# Patient Record
Sex: MALE | Race: WHITE | Employment: FULL TIME | URBAN - METROPOLITAN AREA
[De-identification: names, ages, dates, MRNs, and addresses within clinical notes are randomized per-mention and may not be internally consistent; named-entity substitution may affect disease eponyms.]

---

## 2018-05-21 ENCOUNTER — APPOINTMENT (OUTPATIENT)
Dept: GENERAL RADIOLOGY | Age: 53
DRG: 304 | End: 2018-05-21
Payer: COMMERCIAL

## 2018-05-21 ENCOUNTER — APPOINTMENT (OUTPATIENT)
Dept: ULTRASOUND IMAGING | Age: 53
DRG: 304 | End: 2018-05-21
Payer: COMMERCIAL

## 2018-05-21 ENCOUNTER — APPOINTMENT (OUTPATIENT)
Dept: CT IMAGING | Age: 53
DRG: 304 | End: 2018-05-21
Payer: COMMERCIAL

## 2018-05-21 ENCOUNTER — HOSPITAL ENCOUNTER (INPATIENT)
Age: 53
LOS: 3 days | Discharge: HOME | DRG: 304 | End: 2018-05-24
Attending: EMERGENCY MEDICINE | Admitting: INTERNAL MEDICINE
Payer: COMMERCIAL

## 2018-05-21 DIAGNOSIS — J81.0 FLASH PULMONARY EDEMA (HCC): Primary | ICD-10-CM

## 2018-05-21 DIAGNOSIS — R06.89 ACUTE RESPIRATORY INSUFFICIENCY: ICD-10-CM

## 2018-05-21 DIAGNOSIS — I50.9 ACUTE CONGESTIVE HEART FAILURE, UNSPECIFIED CONGESTIVE HEART FAILURE TYPE: ICD-10-CM

## 2018-05-21 PROBLEM — J96.00 ACUTE RESPIRATORY FAILURE (HCC): Status: ACTIVE | Noted: 2018-05-21

## 2018-05-21 LAB
AADO2: 321.4 MMHG
ALBUMIN SERPL-MCNC: 3.5 G/DL (ref 3.5–5.2)
ALP BLD-CCNC: 98 U/L (ref 40–129)
ALT SERPL-CCNC: 21 U/L (ref 0–40)
ANION GAP SERPL CALCULATED.3IONS-SCNC: 13 MMOL/L (ref 7–16)
AST SERPL-CCNC: 30 U/L (ref 0–39)
B.E.: -3 MMOL/L (ref -3–3)
BASOPHILS ABSOLUTE: 0.04 E9/L (ref 0–0.2)
BASOPHILS RELATIVE PERCENT: 0.5 % (ref 0–2)
BILIRUB SERPL-MCNC: 0.4 MG/DL (ref 0–1.2)
BUN BLDV-MCNC: 26 MG/DL (ref 6–20)
CALCIUM SERPL-MCNC: 9.1 MG/DL (ref 8.6–10.2)
CHLORIDE BLD-SCNC: 106 MMOL/L (ref 98–107)
CHLORIDE URINE RANDOM: 99 MMOL/L
CO2: 23 MMOL/L (ref 22–29)
COHB: 0.9 % (ref 0–1.5)
CREAT SERPL-MCNC: 1.3 MG/DL (ref 0.7–1.2)
CREATININE URINE: 56 MG/DL (ref 40–278)
CRITICAL: ABNORMAL
DATE ANALYZED: ABNORMAL
DATE OF COLLECTION: ABNORMAL
EKG ATRIAL RATE: 103 BPM
EKG P AXIS: 46 DEGREES
EKG P-R INTERVAL: 144 MS
EKG Q-T INTERVAL: 338 MS
EKG QRS DURATION: 94 MS
EKG QTC CALCULATION (BAZETT): 442 MS
EKG R AXIS: -14 DEGREES
EKG T AXIS: 101 DEGREES
EKG VENTRICULAR RATE: 103 BPM
EOSINOPHILS ABSOLUTE: 0 E9/L (ref 0.05–0.5)
EOSINOPHILS RELATIVE PERCENT: 0 % (ref 0–6)
FILM ARRAY ADENOVIRUS: NORMAL
FILM ARRAY BORDETELLA PERTUSSIS: NORMAL
FILM ARRAY CHLAMYDOPHILIA PNEUMONIAE: NORMAL
FILM ARRAY CORONAVIRUS 229E: NORMAL
FILM ARRAY CORONAVIRUS HKU1: NORMAL
FILM ARRAY CORONAVIRUS NL63: NORMAL
FILM ARRAY CORONAVIRUS OC43: NORMAL
FILM ARRAY INFLUENZA A VIRUS 09H1: NORMAL
FILM ARRAY INFLUENZA A VIRUS H1: NORMAL
FILM ARRAY INFLUENZA A VIRUS H3: NORMAL
FILM ARRAY INFLUENZA A VIRUS: NORMAL
FILM ARRAY INFLUENZA B: NORMAL
FILM ARRAY METAPNEUMOVIRUS: NORMAL
FILM ARRAY MYCOPLASMA PNEUMONIAE: NORMAL
FILM ARRAY PARAINFLUENZA VIRUS 1: NORMAL
FILM ARRAY PARAINFLUENZA VIRUS 2: NORMAL
FILM ARRAY PARAINFLUENZA VIRUS 3: NORMAL
FILM ARRAY PARAINFLUENZA VIRUS 4: NORMAL
FILM ARRAY RESPIRATORY SYNCITIAL VIRUS: NORMAL
FILM ARRAY RHINOVIRUS/ENTEROVIRUS: NORMAL
FIO2: 100 %
GFR AFRICAN AMERICAN: >60
GFR NON-AFRICAN AMERICAN: 58 ML/MIN/1.73
GLUCOSE BLD-MCNC: 276 MG/DL (ref 74–109)
HCO3: 21 MMOL/L (ref 22–26)
HCT VFR BLD CALC: 44 % (ref 37–54)
HEMOGLOBIN: 14.8 G/DL (ref 12.5–16.5)
HHB: 0.3 % (ref 0–5)
IMMATURE GRANULOCYTES #: 0.02 E9/L
IMMATURE GRANULOCYTES %: 0.3 % (ref 0–5)
INFLUENZA A BY PCR: NOT DETECTED
INFLUENZA B BY PCR: NOT DETECTED
INR BLD: 1.1
LAB: ABNORMAL
LACTIC ACID: 1.4 MMOL/L (ref 0.5–2.2)
LYMPHOCYTES ABSOLUTE: 1.12 E9/L (ref 1.5–4)
LYMPHOCYTES RELATIVE PERCENT: 14.2 % (ref 20–42)
Lab: 1325
MCH RBC QN AUTO: 29.5 PG (ref 26–35)
MCHC RBC AUTO-ENTMCNC: 33.6 % (ref 32–34.5)
MCV RBC AUTO: 87.6 FL (ref 80–99.9)
METHB: 0.4 % (ref 0–1.5)
MODE: ABNORMAL
MONOCYTES ABSOLUTE: 0.5 E9/L (ref 0.1–0.95)
MONOCYTES RELATIVE PERCENT: 6.3 % (ref 2–12)
NEUTROPHILS ABSOLUTE: 6.2 E9/L (ref 1.8–7.3)
NEUTROPHILS RELATIVE PERCENT: 78.7 % (ref 43–80)
O2 CONTENT: 19.6 ML/DL
O2 SATURATION: 99.7 % (ref 92–98.5)
O2HB: 98.4 % (ref 94–97)
OPERATOR ID: 694
PATIENT TEMP: 37 C
PCO2: 34.4 MMHG (ref 35–45)
PDW BLD-RTO: 12.3 FL (ref 11.5–15)
PFO2: 3.32 MMHG/%
PH BLOOD GAS: 7.4 (ref 7.35–7.45)
PLATELET # BLD: 161 E9/L (ref 130–450)
PMV BLD AUTO: 10.5 FL (ref 7–12)
PO2: 332.2 MMHG (ref 60–100)
POTASSIUM SERPL-SCNC: 4.5 MMOL/L (ref 3.5–5)
PRO-BNP: 4556 PG/ML (ref 0–125)
PROTEIN PROTEIN: 313 MG/DL (ref 0–12)
PROTEIN/CREAT RATIO: 5.6
PROTEIN/CREAT RATIO: 5.6 (ref 0–0.2)
PROTHROMBIN TIME: 11.9 SEC (ref 9.3–12.4)
RBC # BLD: 5.02 E12/L (ref 3.8–5.8)
RI(T): 97 %
SODIUM BLD-SCNC: 142 MMOL/L (ref 132–146)
SODIUM URINE: 92 MMOL/L
SOURCE, BLOOD GAS: ABNORMAL
THB: 13.6 G/DL (ref 11.5–16.5)
TIME ANALYZED: 1328
TOTAL PROTEIN: 6.9 G/DL (ref 6.4–8.3)
TROPONIN: <0.01 NG/ML (ref 0–0.03)
TROPONIN: <0.01 NG/ML (ref 0–0.03)
WBC # BLD: 7.9 E9/L (ref 4.5–11.5)

## 2018-05-21 PROCEDURE — 6370000000 HC RX 637 (ALT 250 FOR IP): Performed by: EMERGENCY MEDICINE

## 2018-05-21 PROCEDURE — 2580000003 HC RX 258: Performed by: INTERNAL MEDICINE

## 2018-05-21 PROCEDURE — 87502 INFLUENZA DNA AMP PROBE: CPT

## 2018-05-21 PROCEDURE — 99254 IP/OBS CNSLTJ NEW/EST MOD 60: CPT | Performed by: INTERNAL MEDICINE

## 2018-05-21 PROCEDURE — 87503 INFLUENZA DNA AMP PROB ADDL: CPT

## 2018-05-21 PROCEDURE — 84156 ASSAY OF PROTEIN URINE: CPT

## 2018-05-21 PROCEDURE — 82570 ASSAY OF URINE CREATININE: CPT

## 2018-05-21 PROCEDURE — 2060000000 HC ICU INTERMEDIATE R&B

## 2018-05-21 PROCEDURE — 87486 CHLMYD PNEUM DNA AMP PROBE: CPT

## 2018-05-21 PROCEDURE — 6360000002 HC RX W HCPCS: Performed by: INTERNAL MEDICINE

## 2018-05-21 PROCEDURE — 6360000004 HC RX CONTRAST MEDICATION: Performed by: RADIOLOGY

## 2018-05-21 PROCEDURE — 83605 ASSAY OF LACTIC ACID: CPT

## 2018-05-21 PROCEDURE — 6370000000 HC RX 637 (ALT 250 FOR IP): Performed by: INTERNAL MEDICINE

## 2018-05-21 PROCEDURE — 87581 M.PNEUMON DNA AMP PROBE: CPT

## 2018-05-21 PROCEDURE — 87798 DETECT AGENT NOS DNA AMP: CPT

## 2018-05-21 PROCEDURE — 71045 X-RAY EXAM CHEST 1 VIEW: CPT

## 2018-05-21 PROCEDURE — 82805 BLOOD GASES W/O2 SATURATION: CPT

## 2018-05-21 PROCEDURE — 85610 PROTHROMBIN TIME: CPT

## 2018-05-21 PROCEDURE — 2500000003 HC RX 250 WO HCPCS: Performed by: INTERNAL MEDICINE

## 2018-05-21 PROCEDURE — 82436 ASSAY OF URINE CHLORIDE: CPT

## 2018-05-21 PROCEDURE — 36415 COLL VENOUS BLD VENIPUNCTURE: CPT

## 2018-05-21 PROCEDURE — 93970 EXTREMITY STUDY: CPT

## 2018-05-21 PROCEDURE — 2500000003 HC RX 250 WO HCPCS: Performed by: EMERGENCY MEDICINE

## 2018-05-21 PROCEDURE — 94640 AIRWAY INHALATION TREATMENT: CPT

## 2018-05-21 PROCEDURE — 84300 ASSAY OF URINE SODIUM: CPT

## 2018-05-21 PROCEDURE — 84484 ASSAY OF TROPONIN QUANT: CPT

## 2018-05-21 PROCEDURE — 87040 BLOOD CULTURE FOR BACTERIA: CPT

## 2018-05-21 PROCEDURE — 96376 TX/PRO/DX INJ SAME DRUG ADON: CPT

## 2018-05-21 PROCEDURE — 6360000002 HC RX W HCPCS: Performed by: EMERGENCY MEDICINE

## 2018-05-21 PROCEDURE — 93005 ELECTROCARDIOGRAM TRACING: CPT | Performed by: EMERGENCY MEDICINE

## 2018-05-21 PROCEDURE — 71275 CT ANGIOGRAPHY CHEST: CPT

## 2018-05-21 PROCEDURE — 94664 DEMO&/EVAL PT USE INHALER: CPT

## 2018-05-21 PROCEDURE — 96375 TX/PRO/DX INJ NEW DRUG ADDON: CPT

## 2018-05-21 PROCEDURE — 96374 THER/PROPH/DIAG INJ IV PUSH: CPT

## 2018-05-21 PROCEDURE — 94660 CPAP INITIATION&MGMT: CPT

## 2018-05-21 PROCEDURE — 83880 ASSAY OF NATRIURETIC PEPTIDE: CPT

## 2018-05-21 PROCEDURE — 85025 COMPLETE CBC W/AUTO DIFF WBC: CPT

## 2018-05-21 PROCEDURE — 99285 EMERGENCY DEPT VISIT HI MDM: CPT

## 2018-05-21 PROCEDURE — 80053 COMPREHEN METABOLIC PANEL: CPT

## 2018-05-21 PROCEDURE — 94760 N-INVAS EAR/PLS OXIMETRY 1: CPT

## 2018-05-21 PROCEDURE — 76770 US EXAM ABDO BACK WALL COMP: CPT

## 2018-05-21 RX ORDER — IPRATROPIUM BROMIDE AND ALBUTEROL SULFATE 2.5; .5 MG/3ML; MG/3ML
3 SOLUTION RESPIRATORY (INHALATION) ONCE
Status: COMPLETED | OUTPATIENT
Start: 2018-05-21 | End: 2018-05-21

## 2018-05-21 RX ORDER — HYDRALAZINE HYDROCHLORIDE 20 MG/ML
10 INJECTION INTRAMUSCULAR; INTRAVENOUS EVERY 4 HOURS PRN
Status: DISCONTINUED | OUTPATIENT
Start: 2018-05-21 | End: 2018-05-21

## 2018-05-21 RX ORDER — SODIUM CHLORIDE 0.9 % (FLUSH) 0.9 %
10 SYRINGE (ML) INJECTION EVERY 12 HOURS SCHEDULED
Status: DISCONTINUED | OUTPATIENT
Start: 2018-05-21 | End: 2018-05-24 | Stop reason: HOSPADM

## 2018-05-21 RX ORDER — ACETAMINOPHEN 325 MG/1
650 TABLET ORAL EVERY 4 HOURS PRN
Status: DISCONTINUED | OUTPATIENT
Start: 2018-05-21 | End: 2018-05-24 | Stop reason: HOSPADM

## 2018-05-21 RX ORDER — SODIUM CHLORIDE 0.9 % (FLUSH) 0.9 %
10 SYRINGE (ML) INJECTION PRN
Status: DISCONTINUED | OUTPATIENT
Start: 2018-05-21 | End: 2018-05-23 | Stop reason: SDUPTHER

## 2018-05-21 RX ORDER — AMLODIPINE BESYLATE 5 MG/1
5 TABLET ORAL DAILY
Status: DISCONTINUED | OUTPATIENT
Start: 2018-05-21 | End: 2018-05-24

## 2018-05-21 RX ORDER — LABETALOL HYDROCHLORIDE 5 MG/ML
10 INJECTION, SOLUTION INTRAVENOUS ONCE
Status: COMPLETED | OUTPATIENT
Start: 2018-05-21 | End: 2018-05-21

## 2018-05-21 RX ORDER — LABETALOL HYDROCHLORIDE 5 MG/ML
10 INJECTION, SOLUTION INTRAVENOUS EVERY 4 HOURS PRN
Status: DISCONTINUED | OUTPATIENT
Start: 2018-05-21 | End: 2018-05-24 | Stop reason: HOSPADM

## 2018-05-21 RX ORDER — SODIUM CHLORIDE 0.9 % (FLUSH) 0.9 %
10 SYRINGE (ML) INJECTION PRN
Status: DISCONTINUED | OUTPATIENT
Start: 2018-05-21 | End: 2018-05-24 | Stop reason: HOSPADM

## 2018-05-21 RX ORDER — HYDRALAZINE HYDROCHLORIDE 20 MG/ML
10 INJECTION INTRAMUSCULAR; INTRAVENOUS EVERY 4 HOURS PRN
Status: DISCONTINUED | OUTPATIENT
Start: 2018-05-21 | End: 2018-05-24 | Stop reason: HOSPADM

## 2018-05-21 RX ORDER — FUROSEMIDE 10 MG/ML
40 INJECTION INTRAMUSCULAR; INTRAVENOUS ONCE
Status: COMPLETED | OUTPATIENT
Start: 2018-05-21 | End: 2018-05-21

## 2018-05-21 RX ORDER — SODIUM CHLORIDE 0.9 % (FLUSH) 0.9 %
10 SYRINGE (ML) INJECTION EVERY 12 HOURS SCHEDULED
Status: DISCONTINUED | OUTPATIENT
Start: 2018-05-21 | End: 2018-05-23 | Stop reason: SDUPTHER

## 2018-05-21 RX ORDER — LABETALOL HYDROCHLORIDE 5 MG/ML
20 INJECTION, SOLUTION INTRAVENOUS ONCE
Status: COMPLETED | OUTPATIENT
Start: 2018-05-21 | End: 2018-05-21

## 2018-05-21 RX ORDER — ONDANSETRON 2 MG/ML
4 INJECTION INTRAMUSCULAR; INTRAVENOUS EVERY 6 HOURS PRN
Status: DISCONTINUED | OUTPATIENT
Start: 2018-05-21 | End: 2018-05-24 | Stop reason: HOSPADM

## 2018-05-21 RX ORDER — HYDRALAZINE HYDROCHLORIDE 20 MG/ML
10 INJECTION INTRAMUSCULAR; INTRAVENOUS ONCE
Status: COMPLETED | OUTPATIENT
Start: 2018-05-21 | End: 2018-05-21

## 2018-05-21 RX ADMIN — IOPAMIDOL 90 ML: 755 INJECTION, SOLUTION INTRAVENOUS at 13:30

## 2018-05-21 RX ADMIN — NITROGLYCERIN 0.5 INCH: 20 OINTMENT TOPICAL at 11:46

## 2018-05-21 RX ADMIN — FUROSEMIDE 40 MG: 10 INJECTION, SOLUTION INTRAMUSCULAR; INTRAVENOUS at 14:58

## 2018-05-21 RX ADMIN — NITROGLYCERIN 1 INCH: 20 OINTMENT TOPICAL at 18:03

## 2018-05-21 RX ADMIN — HYDRALAZINE HYDROCHLORIDE 10 MG: 20 INJECTION INTRAMUSCULAR; INTRAVENOUS at 20:56

## 2018-05-21 RX ADMIN — AMLODIPINE BESYLATE 5 MG: 5 TABLET ORAL at 18:38

## 2018-05-21 RX ADMIN — HYDRALAZINE HYDROCHLORIDE 10 MG: 20 INJECTION INTRAMUSCULAR; INTRAVENOUS at 10:00

## 2018-05-21 RX ADMIN — LABETALOL HYDROCHLORIDE 20 MG: 5 INJECTION INTRAVENOUS at 14:23

## 2018-05-21 RX ADMIN — IPRATROPIUM BROMIDE AND ALBUTEROL SULFATE 3 AMPULE: .5; 3 SOLUTION RESPIRATORY (INHALATION) at 11:23

## 2018-05-21 RX ADMIN — HYDRALAZINE HYDROCHLORIDE 10 MG: 20 INJECTION INTRAMUSCULAR; INTRAVENOUS at 18:03

## 2018-05-21 RX ADMIN — LABETALOL HYDROCHLORIDE 10 MG: 5 INJECTION INTRAVENOUS at 11:44

## 2018-05-21 RX ADMIN — ENOXAPARIN SODIUM 40 MG: 40 INJECTION, SOLUTION INTRAVENOUS; SUBCUTANEOUS at 18:03

## 2018-05-21 RX ADMIN — LABETALOL HYDROCHLORIDE 10 MG: 5 INJECTION, SOLUTION INTRAVENOUS at 18:43

## 2018-05-21 RX ADMIN — FUROSEMIDE 40 MG: 10 INJECTION, SOLUTION INTRAMUSCULAR; INTRAVENOUS at 11:30

## 2018-05-21 RX ADMIN — Medication 10 ML: at 20:57

## 2018-05-21 RX ADMIN — Medication 10 ML: at 21:07

## 2018-05-21 ASSESSMENT — ENCOUNTER SYMPTOMS
SINUS PRESSURE: 0
COLOR CHANGE: 0
EYE REDNESS: 0
SHORTNESS OF BREATH: 1
WHEEZING: 1
SPUTUM PRODUCTION: 0
SORE THROAT: 0
ABDOMINAL DISTENTION: 1
VOMITING: 0
RHINORRHEA: 0
EYE PAIN: 0
COUGH: 1
HEMOPTYSIS: 0
NAUSEA: 0
DIARRHEA: 0
ABDOMINAL PAIN: 0

## 2018-05-21 ASSESSMENT — PAIN SCALES - GENERAL
PAINLEVEL_OUTOF10: 0

## 2018-05-22 ENCOUNTER — APPOINTMENT (OUTPATIENT)
Dept: GENERAL RADIOLOGY | Age: 53
DRG: 304 | End: 2018-05-22
Payer: COMMERCIAL

## 2018-05-22 PROBLEM — I16.0 HYPERTENSIVE URGENCY: Status: ACTIVE | Noted: 2018-05-22

## 2018-05-22 PROBLEM — I31.39 PERICARDIAL EFFUSION: Status: ACTIVE | Noted: 2018-05-22

## 2018-05-22 PROBLEM — J90 BILATERAL PLEURAL EFFUSION: Status: ACTIVE | Noted: 2018-05-22

## 2018-05-22 PROBLEM — I50.31 ACUTE DIASTOLIC CHF (CONGESTIVE HEART FAILURE) (HCC): Status: ACTIVE | Noted: 2018-05-22

## 2018-05-22 LAB
ALBUMIN SERPL-MCNC: 2.8 G/DL (ref 3.5–5.2)
ALP BLD-CCNC: 64 U/L (ref 40–129)
ALT SERPL-CCNC: 14 U/L (ref 0–40)
ANION GAP SERPL CALCULATED.3IONS-SCNC: 13 MMOL/L (ref 7–16)
AST SERPL-CCNC: 16 U/L (ref 0–39)
BASOPHILS ABSOLUTE: 0.03 E9/L (ref 0–0.2)
BASOPHILS RELATIVE PERCENT: 0.4 % (ref 0–2)
BILIRUB SERPL-MCNC: 0.5 MG/DL (ref 0–1.2)
BUN BLDV-MCNC: 27 MG/DL (ref 6–20)
CALCIUM SERPL-MCNC: 8.6 MG/DL (ref 8.6–10.2)
CHLORIDE BLD-SCNC: 106 MMOL/L (ref 98–107)
CO2: 23 MMOL/L (ref 22–29)
CREAT SERPL-MCNC: 1.5 MG/DL (ref 0.7–1.2)
EKG ATRIAL RATE: 72 BPM
EKG P AXIS: 36 DEGREES
EKG P-R INTERVAL: 148 MS
EKG Q-T INTERVAL: 402 MS
EKG QRS DURATION: 94 MS
EKG QTC CALCULATION (BAZETT): 440 MS
EKG R AXIS: -5 DEGREES
EKG T AXIS: 125 DEGREES
EKG VENTRICULAR RATE: 72 BPM
EOSINOPHILS ABSOLUTE: 0 E9/L (ref 0.05–0.5)
EOSINOPHILS RELATIVE PERCENT: 0 % (ref 0–6)
GFR AFRICAN AMERICAN: 60
GFR NON-AFRICAN AMERICAN: 49 ML/MIN/1.73
GLUCOSE BLD-MCNC: 218 MG/DL (ref 74–109)
HCT VFR BLD CALC: 36 % (ref 37–54)
HEMOGLOBIN: 12.4 G/DL (ref 12.5–16.5)
IMMATURE GRANULOCYTES #: 0.03 E9/L
IMMATURE GRANULOCYTES %: 0.4 % (ref 0–5)
LEFT VENTRICULAR EJECTION FRACTION MODE: NORMAL
LV EF: 45 %
LV EF: 45 %
LVEF MODALITY: NORMAL
LYMPHOCYTES ABSOLUTE: 1.22 E9/L (ref 1.5–4)
LYMPHOCYTES RELATIVE PERCENT: 17.2 % (ref 20–42)
MAGNESIUM: 1.9 MG/DL (ref 1.6–2.6)
MCH RBC QN AUTO: 29.6 PG (ref 26–35)
MCHC RBC AUTO-ENTMCNC: 34.4 % (ref 32–34.5)
MCV RBC AUTO: 85.9 FL (ref 80–99.9)
METER GLUCOSE: 182 MG/DL (ref 70–110)
METER GLUCOSE: 218 MG/DL (ref 70–110)
METER GLUCOSE: 278 MG/DL (ref 70–110)
MONOCYTES ABSOLUTE: 0.6 E9/L (ref 0.1–0.95)
MONOCYTES RELATIVE PERCENT: 8.4 % (ref 2–12)
NEUTROPHILS ABSOLUTE: 5.23 E9/L (ref 1.8–7.3)
NEUTROPHILS RELATIVE PERCENT: 73.6 % (ref 43–80)
PDW BLD-RTO: 12.7 FL (ref 11.5–15)
PHOSPHORUS: 3.9 MG/DL (ref 2.5–4.5)
PLATELET # BLD: 166 E9/L (ref 130–450)
PMV BLD AUTO: 10.3 FL (ref 7–12)
POTASSIUM REFLEX MAGNESIUM: 3.5 MMOL/L (ref 3.5–5)
RBC # BLD: 4.19 E12/L (ref 3.8–5.8)
SEDIMENTATION RATE, ERYTHROCYTE: 32 MM/HR (ref 0–15)
SODIUM BLD-SCNC: 142 MMOL/L (ref 132–146)
TOTAL PROTEIN: 5.6 G/DL (ref 6.4–8.3)
TROPONIN: 0.01 NG/ML (ref 0–0.03)
TSH SERPL DL<=0.05 MIU/L-ACNC: 1.27 UIU/ML (ref 0.27–4.2)
WBC # BLD: 7.1 E9/L (ref 4.5–11.5)

## 2018-05-22 PROCEDURE — G8988 SELF CARE GOAL STATUS: HCPCS

## 2018-05-22 PROCEDURE — 6370000000 HC RX 637 (ALT 250 FOR IP): Performed by: INTERNAL MEDICINE

## 2018-05-22 PROCEDURE — 84100 ASSAY OF PHOSPHORUS: CPT

## 2018-05-22 PROCEDURE — 84443 ASSAY THYROID STIM HORMONE: CPT

## 2018-05-22 PROCEDURE — 97161 PT EVAL LOW COMPLEX 20 MIN: CPT

## 2018-05-22 PROCEDURE — 82962 GLUCOSE BLOOD TEST: CPT

## 2018-05-22 PROCEDURE — 99233 SBSQ HOSP IP/OBS HIGH 50: CPT | Performed by: INTERNAL MEDICINE

## 2018-05-22 PROCEDURE — 83735 ASSAY OF MAGNESIUM: CPT

## 2018-05-22 PROCEDURE — 85651 RBC SED RATE NONAUTOMATED: CPT

## 2018-05-22 PROCEDURE — 2060000000 HC ICU INTERMEDIATE R&B

## 2018-05-22 PROCEDURE — 80053 COMPREHEN METABOLIC PANEL: CPT

## 2018-05-22 PROCEDURE — 94660 CPAP INITIATION&MGMT: CPT

## 2018-05-22 PROCEDURE — 36415 COLL VENOUS BLD VENIPUNCTURE: CPT

## 2018-05-22 PROCEDURE — 2700000000 HC OXYGEN THERAPY PER DAY

## 2018-05-22 PROCEDURE — 6360000002 HC RX W HCPCS: Performed by: INTERNAL MEDICINE

## 2018-05-22 PROCEDURE — 2500000003 HC RX 250 WO HCPCS: Performed by: INTERNAL MEDICINE

## 2018-05-22 PROCEDURE — 71046 X-RAY EXAM CHEST 2 VIEWS: CPT

## 2018-05-22 PROCEDURE — G8987 SELF CARE CURRENT STATUS: HCPCS

## 2018-05-22 PROCEDURE — 97165 OT EVAL LOW COMPLEX 30 MIN: CPT

## 2018-05-22 PROCEDURE — 85025 COMPLETE CBC W/AUTO DIFF WBC: CPT

## 2018-05-22 PROCEDURE — 93010 ELECTROCARDIOGRAM REPORT: CPT | Performed by: INTERNAL MEDICINE

## 2018-05-22 PROCEDURE — 93306 TTE W/DOPPLER COMPLETE: CPT

## 2018-05-22 PROCEDURE — 2580000003 HC RX 258: Performed by: INTERNAL MEDICINE

## 2018-05-22 PROCEDURE — G8989 SELF CARE D/C STATUS: HCPCS

## 2018-05-22 PROCEDURE — 84484 ASSAY OF TROPONIN QUANT: CPT

## 2018-05-22 PROCEDURE — 93005 ELECTROCARDIOGRAM TRACING: CPT | Performed by: INTERNAL MEDICINE

## 2018-05-22 RX ORDER — NICOTINE POLACRILEX 4 MG
15 LOZENGE BUCCAL PRN
Status: DISCONTINUED | OUTPATIENT
Start: 2018-05-22 | End: 2018-05-24 | Stop reason: HOSPADM

## 2018-05-22 RX ORDER — METOPROLOL SUCCINATE 25 MG/1
25 TABLET, EXTENDED RELEASE ORAL DAILY
Status: DISCONTINUED | OUTPATIENT
Start: 2018-05-22 | End: 2018-05-23

## 2018-05-22 RX ORDER — DEXTROSE MONOHYDRATE 25 G/50ML
12.5 INJECTION, SOLUTION INTRAVENOUS PRN
Status: DISCONTINUED | OUTPATIENT
Start: 2018-05-22 | End: 2018-05-24 | Stop reason: HOSPADM

## 2018-05-22 RX ORDER — ISOSORBIDE DINITRATE 10 MG/1
20 TABLET ORAL 3 TIMES DAILY
Status: DISCONTINUED | OUTPATIENT
Start: 2018-05-22 | End: 2018-05-24

## 2018-05-22 RX ORDER — ATORVASTATIN CALCIUM 40 MG/1
40 TABLET, FILM COATED ORAL NIGHTLY
Status: DISCONTINUED | OUTPATIENT
Start: 2018-05-22 | End: 2018-05-24 | Stop reason: HOSPADM

## 2018-05-22 RX ORDER — FUROSEMIDE 10 MG/ML
40 INJECTION INTRAMUSCULAR; INTRAVENOUS DAILY
Status: DISCONTINUED | OUTPATIENT
Start: 2018-05-22 | End: 2018-05-23

## 2018-05-22 RX ORDER — DEXTROSE MONOHYDRATE 50 MG/ML
100 INJECTION, SOLUTION INTRAVENOUS PRN
Status: DISCONTINUED | OUTPATIENT
Start: 2018-05-22 | End: 2018-05-24 | Stop reason: HOSPADM

## 2018-05-22 RX ORDER — INSULIN GLARGINE 100 [IU]/ML
0.25 INJECTION, SOLUTION SUBCUTANEOUS NIGHTLY
Status: DISCONTINUED | OUTPATIENT
Start: 2018-05-22 | End: 2018-05-23

## 2018-05-22 RX ORDER — DOCUSATE SODIUM 100 MG/1
100 CAPSULE, LIQUID FILLED ORAL DAILY
Status: DISCONTINUED | OUTPATIENT
Start: 2018-05-22 | End: 2018-05-24 | Stop reason: HOSPADM

## 2018-05-22 RX ORDER — HYDRALAZINE HYDROCHLORIDE 25 MG/1
25 TABLET, FILM COATED ORAL EVERY 8 HOURS SCHEDULED
Status: DISCONTINUED | OUTPATIENT
Start: 2018-05-22 | End: 2018-05-24

## 2018-05-22 RX ADMIN — Medication 10 ML: at 09:51

## 2018-05-22 RX ADMIN — INSULIN LISPRO 2 UNITS: 100 INJECTION, SOLUTION INTRAVENOUS; SUBCUTANEOUS at 16:47

## 2018-05-22 RX ADMIN — NITROGLYCERIN 1 INCH: 20 OINTMENT TOPICAL at 00:23

## 2018-05-22 RX ADMIN — HYDRALAZINE HYDROCHLORIDE 10 MG: 20 INJECTION INTRAMUSCULAR; INTRAVENOUS at 17:32

## 2018-05-22 RX ADMIN — HYDRALAZINE HYDROCHLORIDE 25 MG: 25 TABLET, FILM COATED ORAL at 14:04

## 2018-05-22 RX ADMIN — METOPROLOL SUCCINATE 25 MG: 25 TABLET, EXTENDED RELEASE ORAL at 09:51

## 2018-05-22 RX ADMIN — INSULIN GLARGINE 25 UNITS: 100 INJECTION, SOLUTION SUBCUTANEOUS at 21:29

## 2018-05-22 RX ADMIN — HYDRALAZINE HYDROCHLORIDE 25 MG: 25 TABLET, FILM COATED ORAL at 06:26

## 2018-05-22 RX ADMIN — Medication 10 ML: at 21:26

## 2018-05-22 RX ADMIN — ISOSORBIDE DINITRATE 20 MG: 10 TABLET ORAL at 21:25

## 2018-05-22 RX ADMIN — ATORVASTATIN CALCIUM 40 MG: 40 TABLET, FILM COATED ORAL at 21:25

## 2018-05-22 RX ADMIN — LABETALOL HYDROCHLORIDE 10 MG: 5 INJECTION INTRAVENOUS at 02:08

## 2018-05-22 RX ADMIN — Medication 10 ML: at 21:27

## 2018-05-22 RX ADMIN — FUROSEMIDE 40 MG: 10 INJECTION, SOLUTION INTRAMUSCULAR; INTRAVENOUS at 09:51

## 2018-05-22 RX ADMIN — ISOSORBIDE DINITRATE 20 MG: 10 TABLET ORAL at 09:51

## 2018-05-22 RX ADMIN — INSULIN LISPRO 2 UNITS: 100 INJECTION, SOLUTION INTRAVENOUS; SUBCUTANEOUS at 21:29

## 2018-05-22 RX ADMIN — HYDRALAZINE HYDROCHLORIDE 25 MG: 25 TABLET, FILM COATED ORAL at 21:27

## 2018-05-22 RX ADMIN — AMLODIPINE BESYLATE 5 MG: 5 TABLET ORAL at 09:51

## 2018-05-22 RX ADMIN — DOCUSATE SODIUM 100 MG: 100 CAPSULE, LIQUID FILLED ORAL at 10:39

## 2018-05-22 RX ADMIN — ISOSORBIDE DINITRATE 20 MG: 10 TABLET ORAL at 14:04

## 2018-05-22 RX ADMIN — INSULIN LISPRO 6 UNITS: 100 INJECTION, SOLUTION INTRAVENOUS; SUBCUTANEOUS at 11:33

## 2018-05-22 ASSESSMENT — PAIN SCALES - GENERAL
PAINLEVEL_OUTOF10: 0
PAINLEVEL_OUTOF10: 0

## 2018-05-23 LAB
ALBUMIN SERPL-MCNC: 2.9 G/DL (ref 3.5–5.2)
ALP BLD-CCNC: 58 U/L (ref 40–129)
ALT SERPL-CCNC: 12 U/L (ref 0–40)
ANION GAP SERPL CALCULATED.3IONS-SCNC: 13 MMOL/L (ref 7–16)
ANTI-NUCLEAR ANTIBODY (ANA): NEGATIVE
AST SERPL-CCNC: 16 U/L (ref 0–39)
BASOPHILS ABSOLUTE: 0.04 E9/L (ref 0–0.2)
BASOPHILS RELATIVE PERCENT: 0.6 % (ref 0–2)
BILIRUB SERPL-MCNC: 0.4 MG/DL (ref 0–1.2)
BUN BLDV-MCNC: 31 MG/DL (ref 6–20)
C3 COMPLEMENT: 117 MG/DL (ref 90–180)
C4 COMPLEMENT: 32 MG/DL (ref 10–40)
CALCIUM SERPL-MCNC: 8.5 MG/DL (ref 8.6–10.2)
CHLORIDE BLD-SCNC: 102 MMOL/L (ref 98–107)
CO2: 25 MMOL/L (ref 22–29)
CREAT SERPL-MCNC: 1.7 MG/DL (ref 0.7–1.2)
EOSINOPHILS ABSOLUTE: 0 E9/L (ref 0.05–0.5)
EOSINOPHILS RELATIVE PERCENT: 0 % (ref 0–6)
GFR AFRICAN AMERICAN: 52
GFR NON-AFRICAN AMERICAN: 43 ML/MIN/1.73
GLUCOSE BLD-MCNC: 155 MG/DL (ref 74–109)
HBA1C MFR BLD: 9.3 % (ref 4.8–5.9)
HCT VFR BLD CALC: 33.3 % (ref 37–54)
HEMOGLOBIN: 11.2 G/DL (ref 12.5–16.5)
IMMATURE GRANULOCYTES #: 0.02 E9/L
IMMATURE GRANULOCYTES %: 0.3 % (ref 0–5)
LYMPHOCYTES ABSOLUTE: 1.45 E9/L (ref 1.5–4)
LYMPHOCYTES RELATIVE PERCENT: 23.3 % (ref 20–42)
MAGNESIUM: 1.9 MG/DL (ref 1.6–2.6)
MCH RBC QN AUTO: 29.8 PG (ref 26–35)
MCHC RBC AUTO-ENTMCNC: 33.6 % (ref 32–34.5)
MCV RBC AUTO: 88.6 FL (ref 80–99.9)
METER GLUCOSE: 157 MG/DL (ref 70–110)
METER GLUCOSE: 167 MG/DL (ref 70–110)
METER GLUCOSE: 169 MG/DL (ref 70–110)
METER GLUCOSE: 190 MG/DL (ref 70–110)
MONOCYTES ABSOLUTE: 0.67 E9/L (ref 0.1–0.95)
MONOCYTES RELATIVE PERCENT: 10.8 % (ref 2–12)
NEUTROPHILS ABSOLUTE: 4.03 E9/L (ref 1.8–7.3)
NEUTROPHILS RELATIVE PERCENT: 65 % (ref 43–80)
PDW BLD-RTO: 12.8 FL (ref 11.5–15)
PLATELET # BLD: 154 E9/L (ref 130–450)
PMV BLD AUTO: 10.1 FL (ref 7–12)
POTASSIUM REFLEX MAGNESIUM: 3.3 MMOL/L (ref 3.5–5)
RBC # BLD: 3.76 E12/L (ref 3.8–5.8)
SODIUM BLD-SCNC: 140 MMOL/L (ref 132–146)
TOTAL PROTEIN: 5.1 G/DL (ref 6.4–8.3)
URIC ACID, SERUM: 7.8 MG/DL (ref 3.4–7)
WBC # BLD: 6.2 E9/L (ref 4.5–11.5)

## 2018-05-23 PROCEDURE — 2580000003 HC RX 258: Performed by: INTERNAL MEDICINE

## 2018-05-23 PROCEDURE — 80053 COMPREHEN METABOLIC PANEL: CPT

## 2018-05-23 PROCEDURE — 83735 ASSAY OF MAGNESIUM: CPT

## 2018-05-23 PROCEDURE — 2060000000 HC ICU INTERMEDIATE R&B

## 2018-05-23 PROCEDURE — 6370000000 HC RX 637 (ALT 250 FOR IP): Performed by: INTERNAL MEDICINE

## 2018-05-23 PROCEDURE — 6360000002 HC RX W HCPCS: Performed by: INTERNAL MEDICINE

## 2018-05-23 PROCEDURE — 99233 SBSQ HOSP IP/OBS HIGH 50: CPT | Performed by: INTERNAL MEDICINE

## 2018-05-23 PROCEDURE — 86038 ANTINUCLEAR ANTIBODIES: CPT

## 2018-05-23 PROCEDURE — 86160 COMPLEMENT ANTIGEN: CPT

## 2018-05-23 PROCEDURE — 83036 HEMOGLOBIN GLYCOSYLATED A1C: CPT

## 2018-05-23 PROCEDURE — 36415 COLL VENOUS BLD VENIPUNCTURE: CPT

## 2018-05-23 PROCEDURE — 2500000003 HC RX 250 WO HCPCS: Performed by: INTERNAL MEDICINE

## 2018-05-23 PROCEDURE — 84156 ASSAY OF PROTEIN URINE: CPT

## 2018-05-23 PROCEDURE — 82575 CREATININE CLEARANCE TEST: CPT

## 2018-05-23 PROCEDURE — 85025 COMPLETE CBC W/AUTO DIFF WBC: CPT

## 2018-05-23 PROCEDURE — 84300 ASSAY OF URINE SODIUM: CPT

## 2018-05-23 PROCEDURE — 82962 GLUCOSE BLOOD TEST: CPT

## 2018-05-23 PROCEDURE — 84550 ASSAY OF BLOOD/URIC ACID: CPT

## 2018-05-23 PROCEDURE — 2700000000 HC OXYGEN THERAPY PER DAY

## 2018-05-23 PROCEDURE — 94660 CPAP INITIATION&MGMT: CPT

## 2018-05-23 RX ORDER — METOPROLOL SUCCINATE 50 MG/1
50 TABLET, EXTENDED RELEASE ORAL DAILY
Status: DISCONTINUED | OUTPATIENT
Start: 2018-05-24 | End: 2018-05-24 | Stop reason: HOSPADM

## 2018-05-23 RX ORDER — FUROSEMIDE 10 MG/ML
40 INJECTION INTRAMUSCULAR; INTRAVENOUS 2 TIMES DAILY
Status: DISCONTINUED | OUTPATIENT
Start: 2018-05-23 | End: 2018-05-24 | Stop reason: HOSPADM

## 2018-05-23 RX ORDER — GLIPIZIDE 5 MG/1
2.5 TABLET ORAL
Status: DISCONTINUED | OUTPATIENT
Start: 2018-05-24 | End: 2018-05-24 | Stop reason: HOSPADM

## 2018-05-23 RX ORDER — POTASSIUM CHLORIDE 20 MEQ/1
40 TABLET, EXTENDED RELEASE ORAL 2 TIMES DAILY WITH MEALS
Status: COMPLETED | OUTPATIENT
Start: 2018-05-23 | End: 2018-05-24

## 2018-05-23 RX ORDER — METOPROLOL SUCCINATE 25 MG/1
25 TABLET, EXTENDED RELEASE ORAL ONCE
Status: COMPLETED | OUTPATIENT
Start: 2018-05-23 | End: 2018-05-23

## 2018-05-23 RX ADMIN — HYDRALAZINE HYDROCHLORIDE 10 MG: 20 INJECTION INTRAMUSCULAR; INTRAVENOUS at 11:01

## 2018-05-23 RX ADMIN — ISOSORBIDE DINITRATE 20 MG: 10 TABLET ORAL at 20:47

## 2018-05-23 RX ADMIN — INSULIN LISPRO 2 UNITS: 100 INJECTION, SOLUTION INTRAVENOUS; SUBCUTANEOUS at 11:01

## 2018-05-23 RX ADMIN — DOCUSATE SODIUM 100 MG: 100 CAPSULE, LIQUID FILLED ORAL at 08:58

## 2018-05-23 RX ADMIN — HYDRALAZINE HYDROCHLORIDE 25 MG: 25 TABLET, FILM COATED ORAL at 14:38

## 2018-05-23 RX ADMIN — ISOSORBIDE DINITRATE 20 MG: 10 TABLET ORAL at 08:58

## 2018-05-23 RX ADMIN — FUROSEMIDE 40 MG: 10 INJECTION, SOLUTION INTRAMUSCULAR; INTRAVENOUS at 08:58

## 2018-05-23 RX ADMIN — Medication 10 ML: at 20:49

## 2018-05-23 RX ADMIN — LINAGLIPTIN 5 MG: 5 TABLET, FILM COATED ORAL at 11:01

## 2018-05-23 RX ADMIN — INSULIN LISPRO 2 UNITS: 100 INJECTION, SOLUTION INTRAVENOUS; SUBCUTANEOUS at 16:21

## 2018-05-23 RX ADMIN — METOPROLOL SUCCINATE 25 MG: 25 TABLET, EXTENDED RELEASE ORAL at 08:58

## 2018-05-23 RX ADMIN — METOPROLOL SUCCINATE 25 MG: 25 TABLET, EXTENDED RELEASE ORAL at 12:58

## 2018-05-23 RX ADMIN — INSULIN LISPRO 2 UNITS: 100 INJECTION, SOLUTION INTRAVENOUS; SUBCUTANEOUS at 09:03

## 2018-05-23 RX ADMIN — POTASSIUM CHLORIDE 40 MEQ: 20 TABLET, EXTENDED RELEASE ORAL at 16:21

## 2018-05-23 RX ADMIN — HYDRALAZINE HYDROCHLORIDE 25 MG: 25 TABLET, FILM COATED ORAL at 06:43

## 2018-05-23 RX ADMIN — LABETALOL HYDROCHLORIDE 10 MG: 5 INJECTION INTRAVENOUS at 04:14

## 2018-05-23 RX ADMIN — ATORVASTATIN CALCIUM 40 MG: 40 TABLET, FILM COATED ORAL at 20:47

## 2018-05-23 RX ADMIN — POTASSIUM CHLORIDE 40 MEQ: 20 TABLET, EXTENDED RELEASE ORAL at 08:58

## 2018-05-23 RX ADMIN — AMLODIPINE BESYLATE 5 MG: 5 TABLET ORAL at 08:58

## 2018-05-23 RX ADMIN — Medication 10 ML: at 08:58

## 2018-05-23 RX ADMIN — ISOSORBIDE DINITRATE 20 MG: 10 TABLET ORAL at 14:38

## 2018-05-23 RX ADMIN — FUROSEMIDE 40 MG: 10 INJECTION, SOLUTION INTRAMUSCULAR; INTRAVENOUS at 18:01

## 2018-05-23 RX ADMIN — HYDRALAZINE HYDROCHLORIDE 25 MG: 25 TABLET, FILM COATED ORAL at 22:21

## 2018-05-23 RX ADMIN — Medication 10 ML: at 04:15

## 2018-05-23 RX ADMIN — ENOXAPARIN SODIUM 40 MG: 40 INJECTION, SOLUTION INTRAVENOUS; SUBCUTANEOUS at 08:58

## 2018-05-23 RX ADMIN — INSULIN LISPRO 1 UNITS: 100 INJECTION, SOLUTION INTRAVENOUS; SUBCUTANEOUS at 20:47

## 2018-05-23 ASSESSMENT — PAIN SCALES - GENERAL
PAINLEVEL_OUTOF10: 0

## 2018-05-24 VITALS
SYSTOLIC BLOOD PRESSURE: 170 MMHG | OXYGEN SATURATION: 96 % | DIASTOLIC BLOOD PRESSURE: 92 MMHG | BODY MASS INDEX: 37.61 KG/M2 | HEIGHT: 64 IN | TEMPERATURE: 98.4 F | HEART RATE: 80 BPM | WEIGHT: 220.3 LBS | RESPIRATION RATE: 16 BRPM

## 2018-05-24 LAB
24HR URINE VOLUME (ML): 2600 ML
ALBUMIN SERPL-MCNC: 2.7 G/DL (ref 3.5–5.2)
ALP BLD-CCNC: 58 U/L (ref 40–129)
ALT SERPL-CCNC: 12 U/L (ref 0–40)
ANION GAP SERPL CALCULATED.3IONS-SCNC: 13 MMOL/L (ref 7–16)
AST SERPL-CCNC: 15 U/L (ref 0–39)
BASOPHILS ABSOLUTE: 0.05 E9/L (ref 0–0.2)
BASOPHILS RELATIVE PERCENT: 0.8 % (ref 0–2)
BILIRUB SERPL-MCNC: 0.5 MG/DL (ref 0–1.2)
BUN BLDV-MCNC: 27 MG/DL (ref 6–20)
CALCIUM SERPL-MCNC: 8.6 MG/DL (ref 8.6–10.2)
CHLORIDE BLD-SCNC: 105 MMOL/L (ref 98–107)
CO2: 24 MMOL/L (ref 22–29)
CREAT SERPL-MCNC: 1.6 MG/DL (ref 0.7–1.2)
CREAT SERPL-MCNC: 1.7 MG/DL (ref 0.7–1.2)
CREATININE 24 HOUR URINE: 2158 MG/24H (ref 980–2200)
CREATININE CLEARANCE: 88.2 ML/MIN (ref 72–130)
EOSINOPHILS ABSOLUTE: 0.01 E9/L (ref 0.05–0.5)
EOSINOPHILS RELATIVE PERCENT: 0.2 % (ref 0–6)
GFR AFRICAN AMERICAN: 55
GFR NON-AFRICAN AMERICAN: 46 ML/MIN/1.73
GLUCOSE BLD-MCNC: 123 MG/DL (ref 74–109)
HCT VFR BLD CALC: 33.9 % (ref 37–54)
HEMOGLOBIN: 11.5 G/DL (ref 12.5–16.5)
IMMATURE GRANULOCYTES #: 0.02 E9/L
IMMATURE GRANULOCYTES %: 0.3 % (ref 0–5)
LYMPHOCYTES ABSOLUTE: 1.55 E9/L (ref 1.5–4)
LYMPHOCYTES RELATIVE PERCENT: 24.8 % (ref 20–42)
Lab: 24 HOURS
MCH RBC QN AUTO: 29.8 PG (ref 26–35)
MCHC RBC AUTO-ENTMCNC: 33.9 % (ref 32–34.5)
MCV RBC AUTO: 87.8 FL (ref 80–99.9)
METER GLUCOSE: 114 MG/DL (ref 70–110)
METER GLUCOSE: 128 MG/DL (ref 70–110)
MONOCYTES ABSOLUTE: 0.65 E9/L (ref 0.1–0.95)
MONOCYTES RELATIVE PERCENT: 10.4 % (ref 2–12)
NEUTROPHILS ABSOLUTE: 3.96 E9/L (ref 1.8–7.3)
NEUTROPHILS RELATIVE PERCENT: 63.5 % (ref 43–80)
PDW BLD-RTO: 12.8 FL (ref 11.5–15)
PLATELET # BLD: 155 E9/L (ref 130–450)
PMV BLD AUTO: 10 FL (ref 7–12)
POTASSIUM REFLEX MAGNESIUM: 3.8 MMOL/L (ref 3.5–5)
PROTEIN 24 HOUR URINE: 6.45 G/24HR (ref 0–0.14)
RBC # BLD: 3.86 E12/L (ref 3.8–5.8)
SODIUM 24 HOUR URINE: 216 MMOL/24 HR (ref 40–220)
SODIUM BLD-SCNC: 142 MMOL/L (ref 132–146)
TOTAL PROTEIN: 5.4 G/DL (ref 6.4–8.3)
WBC # BLD: 6.2 E9/L (ref 4.5–11.5)

## 2018-05-24 PROCEDURE — 6360000002 HC RX W HCPCS: Performed by: INTERNAL MEDICINE

## 2018-05-24 PROCEDURE — 6370000000 HC RX 637 (ALT 250 FOR IP): Performed by: INTERNAL MEDICINE

## 2018-05-24 PROCEDURE — 80053 COMPREHEN METABOLIC PANEL: CPT

## 2018-05-24 PROCEDURE — 36415 COLL VENOUS BLD VENIPUNCTURE: CPT

## 2018-05-24 PROCEDURE — 85025 COMPLETE CBC W/AUTO DIFF WBC: CPT

## 2018-05-24 PROCEDURE — 99232 SBSQ HOSP IP/OBS MODERATE 35: CPT | Performed by: INTERNAL MEDICINE

## 2018-05-24 PROCEDURE — 2580000003 HC RX 258: Performed by: INTERNAL MEDICINE

## 2018-05-24 PROCEDURE — 82962 GLUCOSE BLOOD TEST: CPT

## 2018-05-24 PROCEDURE — 2500000003 HC RX 250 WO HCPCS: Performed by: INTERNAL MEDICINE

## 2018-05-24 RX ORDER — HYDRALAZINE HYDROCHLORIDE 50 MG/1
50 TABLET, FILM COATED ORAL EVERY 8 HOURS SCHEDULED
Status: DISCONTINUED | OUTPATIENT
Start: 2018-05-24 | End: 2018-05-24 | Stop reason: HOSPADM

## 2018-05-24 RX ORDER — GLIPIZIDE 5 MG/1
2.5 TABLET ORAL
Qty: 30 TABLET | Refills: 0 | Status: SHIPPED | OUTPATIENT
Start: 2018-05-25 | End: 2020-02-04 | Stop reason: SDUPTHER

## 2018-05-24 RX ORDER — FUROSEMIDE 40 MG/1
40 TABLET ORAL 2 TIMES DAILY
Qty: 60 TABLET | Refills: 0 | Status: SHIPPED | OUTPATIENT
Start: 2018-05-24 | End: 2019-10-30 | Stop reason: SDUPTHER

## 2018-05-24 RX ORDER — ISOSORBIDE DINITRATE 10 MG/1
40 TABLET ORAL 3 TIMES DAILY
Status: DISCONTINUED | OUTPATIENT
Start: 2018-05-24 | End: 2018-05-24 | Stop reason: HOSPADM

## 2018-05-24 RX ORDER — ISOSORBIDE DINITRATE 40 MG/1
40 TABLET ORAL 3 TIMES DAILY
Qty: 90 TABLET | Refills: 0 | Status: SHIPPED | OUTPATIENT
Start: 2018-05-24 | End: 2018-06-06 | Stop reason: DRUGHIGH

## 2018-05-24 RX ORDER — ATORVASTATIN CALCIUM 40 MG/1
40 TABLET, FILM COATED ORAL NIGHTLY
Qty: 30 TABLET | Refills: 0 | Status: SHIPPED | OUTPATIENT
Start: 2018-05-24 | End: 2020-01-30 | Stop reason: SDUPTHER

## 2018-05-24 RX ORDER — HYDRALAZINE HYDROCHLORIDE 50 MG/1
50 TABLET, FILM COATED ORAL EVERY 8 HOURS SCHEDULED
Qty: 90 TABLET | Refills: 0 | Status: SHIPPED | OUTPATIENT
Start: 2018-05-24 | End: 2018-06-06 | Stop reason: ALTCHOICE

## 2018-05-24 RX ORDER — METOPROLOL SUCCINATE 50 MG/1
50 TABLET, EXTENDED RELEASE ORAL DAILY
Qty: 30 TABLET | Refills: 0 | Status: SHIPPED | OUTPATIENT
Start: 2018-05-25 | End: 2019-11-07 | Stop reason: SDUPTHER

## 2018-05-24 RX ADMIN — ISOSORBIDE DINITRATE 40 MG: 10 TABLET ORAL at 13:49

## 2018-05-24 RX ADMIN — LINAGLIPTIN 5 MG: 5 TABLET, FILM COATED ORAL at 07:47

## 2018-05-24 RX ADMIN — ISOSORBIDE DINITRATE 40 MG: 10 TABLET ORAL at 09:06

## 2018-05-24 RX ADMIN — FUROSEMIDE 40 MG: 10 INJECTION, SOLUTION INTRAMUSCULAR; INTRAVENOUS at 07:47

## 2018-05-24 RX ADMIN — Medication 10 ML: at 00:47

## 2018-05-24 RX ADMIN — POTASSIUM CHLORIDE 40 MEQ: 20 TABLET, EXTENDED RELEASE ORAL at 07:48

## 2018-05-24 RX ADMIN — GLIPIZIDE 2.5 MG: 5 TABLET ORAL at 06:53

## 2018-05-24 RX ADMIN — ENOXAPARIN SODIUM 40 MG: 40 INJECTION, SOLUTION INTRAVENOUS; SUBCUTANEOUS at 07:48

## 2018-05-24 RX ADMIN — LABETALOL HYDROCHLORIDE 10 MG: 5 INJECTION INTRAVENOUS at 00:46

## 2018-05-24 RX ADMIN — HYDRALAZINE HYDROCHLORIDE 50 MG: 25 TABLET, FILM COATED ORAL at 13:49

## 2018-05-24 RX ADMIN — METOPROLOL SUCCINATE 50 MG: 50 TABLET, EXTENDED RELEASE ORAL at 07:47

## 2018-05-24 RX ADMIN — HYDRALAZINE HYDROCHLORIDE 10 MG: 20 INJECTION INTRAMUSCULAR; INTRAVENOUS at 04:39

## 2018-05-24 RX ADMIN — Medication 10 ML: at 07:48

## 2018-05-24 RX ADMIN — Medication 10 ML: at 04:41

## 2018-05-24 RX ADMIN — HYDRALAZINE HYDROCHLORIDE 50 MG: 25 TABLET, FILM COATED ORAL at 06:47

## 2018-05-24 RX ADMIN — DOCUSATE SODIUM 100 MG: 100 CAPSULE, LIQUID FILLED ORAL at 07:48

## 2018-05-24 ASSESSMENT — PAIN SCALES - GENERAL
PAINLEVEL_OUTOF10: 0
PAINLEVEL_OUTOF10: 1
PAINLEVEL_OUTOF10: 0
PAINLEVEL_OUTOF10: 0

## 2018-05-25 ENCOUNTER — TELEPHONE (OUTPATIENT)
Dept: CARDIOLOGY CLINIC | Age: 53
End: 2018-05-25

## 2018-05-26 LAB
BLOOD CULTURE, ROUTINE: NORMAL
CULTURE, BLOOD 2: NORMAL

## 2018-05-29 ENCOUNTER — TELEPHONE (OUTPATIENT)
Dept: CARDIOLOGY CLINIC | Age: 53
End: 2018-05-29

## 2018-06-06 ENCOUNTER — OFFICE VISIT (OUTPATIENT)
Dept: CARDIOLOGY CLINIC | Age: 53
End: 2018-06-06
Payer: COMMERCIAL

## 2018-06-06 VITALS
HEIGHT: 64 IN | SYSTOLIC BLOOD PRESSURE: 168 MMHG | HEART RATE: 62 BPM | RESPIRATION RATE: 16 BRPM | DIASTOLIC BLOOD PRESSURE: 92 MMHG | BODY MASS INDEX: 32.64 KG/M2 | WEIGHT: 191.2 LBS

## 2018-06-06 DIAGNOSIS — I10 ESSENTIAL HYPERTENSION: ICD-10-CM

## 2018-06-06 DIAGNOSIS — E78.00 PURE HYPERCHOLESTEROLEMIA: ICD-10-CM

## 2018-06-06 DIAGNOSIS — I42.8 NONISCHEMIC CARDIOMYOPATHY (HCC): Primary | ICD-10-CM

## 2018-06-06 DIAGNOSIS — I50.32 CHRONIC DIASTOLIC HEART FAILURE (HCC): ICD-10-CM

## 2018-06-06 DIAGNOSIS — E11.9 NON-INSULIN DEPENDENT TYPE 2 DIABETES MELLITUS (HCC): ICD-10-CM

## 2018-06-06 PROCEDURE — 93000 ELECTROCARDIOGRAM COMPLETE: CPT | Performed by: INTERNAL MEDICINE

## 2018-06-06 PROCEDURE — 3046F HEMOGLOBIN A1C LEVEL >9.0%: CPT | Performed by: INTERNAL MEDICINE

## 2018-06-06 PROCEDURE — 1111F DSCHRG MED/CURRENT MED MERGE: CPT | Performed by: INTERNAL MEDICINE

## 2018-06-06 PROCEDURE — 1036F TOBACCO NON-USER: CPT | Performed by: INTERNAL MEDICINE

## 2018-06-06 PROCEDURE — 3017F COLORECTAL CA SCREEN DOC REV: CPT | Performed by: INTERNAL MEDICINE

## 2018-06-06 PROCEDURE — 2022F DILAT RTA XM EVC RTNOPTHY: CPT | Performed by: INTERNAL MEDICINE

## 2018-06-06 PROCEDURE — G8417 CALC BMI ABV UP PARAM F/U: HCPCS | Performed by: INTERNAL MEDICINE

## 2018-06-06 PROCEDURE — G8427 DOCREV CUR MEDS BY ELIG CLIN: HCPCS | Performed by: INTERNAL MEDICINE

## 2018-06-06 PROCEDURE — 99214 OFFICE O/P EST MOD 30 MIN: CPT | Performed by: INTERNAL MEDICINE

## 2018-06-06 RX ORDER — LISINOPRIL 5 MG/1
5 TABLET ORAL 2 TIMES DAILY
COMMUNITY
Start: 2018-05-29 | End: 2018-06-26 | Stop reason: SDUPTHER

## 2018-06-06 RX ORDER — METOLAZONE 2.5 MG/1
2.5 TABLET ORAL
COMMUNITY
Start: 2018-06-03 | End: 2019-11-07 | Stop reason: SDUPTHER

## 2018-06-06 RX ORDER — ISOSORBIDE DINITRATE 20 MG/1
40 TABLET ORAL 3 TIMES DAILY
COMMUNITY
Start: 2018-05-24 | End: 2019-06-18 | Stop reason: SDUPTHER

## 2018-06-25 ENCOUNTER — HOSPITAL ENCOUNTER (OUTPATIENT)
Dept: CARDIOLOGY | Age: 53
Discharge: HOME | End: 2018-06-25
Payer: COMMERCIAL

## 2018-06-25 VITALS
HEIGHT: 64 IN | WEIGHT: 191 LBS | DIASTOLIC BLOOD PRESSURE: 88 MMHG | HEART RATE: 68 BPM | SYSTOLIC BLOOD PRESSURE: 150 MMHG | BODY MASS INDEX: 32.61 KG/M2

## 2018-06-25 DIAGNOSIS — I42.8 NONISCHEMIC CARDIOMYOPATHY (HCC): ICD-10-CM

## 2018-06-25 DIAGNOSIS — I50.32 CHRONIC DIASTOLIC HEART FAILURE (HCC): ICD-10-CM

## 2018-06-25 PROCEDURE — A9502 TC99M TETROFOSMIN: HCPCS | Performed by: INTERNAL MEDICINE

## 2018-06-25 PROCEDURE — 2580000003 HC RX 258: Performed by: INTERNAL MEDICINE

## 2018-06-25 PROCEDURE — 6360000002 HC RX W HCPCS: Performed by: INTERNAL MEDICINE

## 2018-06-25 PROCEDURE — 3430000000 HC RX DIAGNOSTIC RADIOPHARMACEUTICAL: Performed by: INTERNAL MEDICINE

## 2018-06-25 PROCEDURE — 93017 CV STRESS TEST TRACING ONLY: CPT

## 2018-06-25 PROCEDURE — 78452 HT MUSCLE IMAGE SPECT MULT: CPT

## 2018-06-25 RX ORDER — SODIUM CHLORIDE 0.9 % (FLUSH) 0.9 %
10 SYRINGE (ML) INJECTION PRN
Status: DISCONTINUED | OUTPATIENT
Start: 2018-06-25 | End: 2018-06-26 | Stop reason: HOSPADM

## 2018-06-25 RX ADMIN — REGADENOSON 0.4 MG: 0.08 INJECTION, SOLUTION INTRAVENOUS at 10:55

## 2018-06-25 RX ADMIN — TETROFOSMIN 10.4 MILLICURIE: 0.23 INJECTION, POWDER, LYOPHILIZED, FOR SOLUTION INTRAVENOUS at 09:14

## 2018-06-25 RX ADMIN — TETROFOSMIN 32.7 MILLICURIE: 0.23 INJECTION, POWDER, LYOPHILIZED, FOR SOLUTION INTRAVENOUS at 10:55

## 2018-06-25 RX ADMIN — Medication 10 ML: at 09:15

## 2018-06-25 RX ADMIN — Medication 10 ML: at 10:55

## 2018-06-25 RX ADMIN — Medication 10 ML: at 11:54

## 2018-06-26 ENCOUNTER — TELEPHONE (OUTPATIENT)
Dept: CARDIOLOGY CLINIC | Age: 53
End: 2018-06-26

## 2018-06-26 DIAGNOSIS — I10 ESSENTIAL HYPERTENSION: ICD-10-CM

## 2018-06-26 DIAGNOSIS — I31.39 PERICARDIAL EFFUSION: ICD-10-CM

## 2018-06-26 DIAGNOSIS — I50.31 ACUTE DIASTOLIC CHF (CONGESTIVE HEART FAILURE) (HCC): Primary | ICD-10-CM

## 2018-06-26 RX ORDER — LISINOPRIL 5 MG/1
5 TABLET ORAL 2 TIMES DAILY
Qty: 60 TABLET | Refills: 11 | Status: SHIPPED | OUTPATIENT
Start: 2018-06-26 | End: 2019-06-04 | Stop reason: SDUPTHER

## 2018-07-03 ENCOUNTER — TELEPHONE (OUTPATIENT)
Dept: CARDIOLOGY CLINIC | Age: 53
End: 2018-07-03

## 2018-07-03 DIAGNOSIS — I31.39 PERICARDIAL EFFUSION: ICD-10-CM

## 2018-07-03 DIAGNOSIS — I42.8 NONISCHEMIC CARDIOMYOPATHY (HCC): ICD-10-CM

## 2018-07-03 DIAGNOSIS — I50.31 ACUTE DIASTOLIC CHF (CONGESTIVE HEART FAILURE) (HCC): Primary | ICD-10-CM

## 2018-07-03 DIAGNOSIS — I50.31 ACUTE DIASTOLIC CHF (CONGESTIVE HEART FAILURE) (HCC): ICD-10-CM

## 2018-07-03 DIAGNOSIS — I10 ESSENTIAL HYPERTENSION: ICD-10-CM

## 2018-07-03 NOTE — TELEPHONE ENCOUNTER
7/3/18    Spoke with patient with lab results per Dr. Castro Matty  Slight change in kidney function but not enough to alter meds. Repeat BMP on Monday 7/9/18 and we will see then how to proceed. Mailed lab slip to patient.     ONEYDA

## 2018-07-10 ENCOUNTER — TELEPHONE (OUTPATIENT)
Dept: CARDIOLOGY CLINIC | Age: 53
End: 2018-07-10

## 2018-07-10 DIAGNOSIS — I10 ESSENTIAL HYPERTENSION: ICD-10-CM

## 2018-07-10 DIAGNOSIS — I42.8 NONISCHEMIC CARDIOMYOPATHY (HCC): ICD-10-CM

## 2018-07-10 DIAGNOSIS — I50.31 ACUTE DIASTOLIC CHF (CONGESTIVE HEART FAILURE) (HCC): ICD-10-CM

## 2018-07-10 NOTE — TELEPHONE ENCOUNTER
----- Message from Kranthi Martinez MD sent at 7/10/2018  8:49 AM EDT -----  Please notify patient did laboratory studies have stabilize, continue medications as directed and follow-up as scheduled

## 2018-09-05 ENCOUNTER — TELEPHONE (OUTPATIENT)
Dept: CARDIOLOGY CLINIC | Age: 53
End: 2018-09-05

## 2019-06-04 ENCOUNTER — OFFICE VISIT (OUTPATIENT)
Dept: PRIMARY CARE CLINIC | Age: 54
End: 2019-06-04
Payer: COMMERCIAL

## 2019-06-04 ENCOUNTER — HOSPITAL ENCOUNTER (OUTPATIENT)
Age: 54
Discharge: HOME | End: 2019-06-06
Payer: COMMERCIAL

## 2019-06-04 VITALS
OXYGEN SATURATION: 98 % | WEIGHT: 204 LBS | SYSTOLIC BLOOD PRESSURE: 138 MMHG | TEMPERATURE: 96.8 F | HEIGHT: 64 IN | DIASTOLIC BLOOD PRESSURE: 82 MMHG | BODY MASS INDEX: 34.83 KG/M2 | HEART RATE: 58 BPM

## 2019-06-04 DIAGNOSIS — E11.22 TYPE 2 DIABETES MELLITUS WITH STAGE 3 CHRONIC KIDNEY DISEASE, WITHOUT LONG-TERM CURRENT USE OF INSULIN (HCC): ICD-10-CM

## 2019-06-04 DIAGNOSIS — N18.30 CKD (CHRONIC KIDNEY DISEASE) STAGE 3, GFR 30-59 ML/MIN (HCC): ICD-10-CM

## 2019-06-04 DIAGNOSIS — I10 ESSENTIAL HYPERTENSION: ICD-10-CM

## 2019-06-04 DIAGNOSIS — N18.30 TYPE 2 DIABETES MELLITUS WITH STAGE 3 CHRONIC KIDNEY DISEASE, WITHOUT LONG-TERM CURRENT USE OF INSULIN (HCC): ICD-10-CM

## 2019-06-04 DIAGNOSIS — E78.5 HYPERLIPIDEMIA, UNSPECIFIED HYPERLIPIDEMIA TYPE: ICD-10-CM

## 2019-06-04 DIAGNOSIS — E11.22 TYPE 2 DIABETES MELLITUS WITH STAGE 3 CHRONIC KIDNEY DISEASE, WITHOUT LONG-TERM CURRENT USE OF INSULIN (HCC): Primary | ICD-10-CM

## 2019-06-04 DIAGNOSIS — N18.30 TYPE 2 DIABETES MELLITUS WITH STAGE 3 CHRONIC KIDNEY DISEASE, WITHOUT LONG-TERM CURRENT USE OF INSULIN (HCC): Primary | ICD-10-CM

## 2019-06-04 DIAGNOSIS — I42.8 NONISCHEMIC CARDIOMYOPATHY (HCC): ICD-10-CM

## 2019-06-04 LAB
ALBUMIN SERPL-MCNC: 4.3 G/DL (ref 3.5–5.2)
ALP BLD-CCNC: 73 U/L (ref 40–129)
ALT SERPL-CCNC: 27 U/L (ref 0–40)
ANION GAP SERPL CALCULATED.3IONS-SCNC: 13 MMOL/L (ref 7–16)
AST SERPL-CCNC: 22 U/L (ref 0–39)
BASOPHILS ABSOLUTE: 0.04 E9/L (ref 0–0.2)
BASOPHILS RELATIVE PERCENT: 0.7 % (ref 0–2)
BILIRUB SERPL-MCNC: 0.5 MG/DL (ref 0–1.2)
BUN BLDV-MCNC: 33 MG/DL (ref 6–20)
CALCIUM SERPL-MCNC: 9.7 MG/DL (ref 8.6–10.2)
CHLORIDE BLD-SCNC: 103 MMOL/L (ref 98–107)
CHOLESTEROL, TOTAL: 153 MG/DL (ref 0–199)
CO2: 25 MMOL/L (ref 22–29)
CREAT SERPL-MCNC: 1.6 MG/DL (ref 0.7–1.2)
EOSINOPHILS ABSOLUTE: 0 E9/L (ref 0.05–0.5)
EOSINOPHILS RELATIVE PERCENT: 0 % (ref 0–6)
GFR AFRICAN AMERICAN: 55
GFR NON-AFRICAN AMERICAN: 46 ML/MIN/1.73
GLUCOSE BLD-MCNC: 120 MG/DL (ref 74–99)
HBA1C MFR BLD: 8 % (ref 4–5.6)
HCT VFR BLD CALC: 35.6 % (ref 37–54)
HDLC SERPL-MCNC: 31 MG/DL
HEMOGLOBIN: 11.8 G/DL (ref 12.5–16.5)
IMMATURE GRANULOCYTES #: 0.01 E9/L
IMMATURE GRANULOCYTES %: 0.2 % (ref 0–5)
LDL CHOLESTEROL CALCULATED: 57 MG/DL (ref 0–99)
LYMPHOCYTES ABSOLUTE: 1.44 E9/L (ref 1.5–4)
LYMPHOCYTES RELATIVE PERCENT: 26.7 % (ref 20–42)
MCH RBC QN AUTO: 29.2 PG (ref 26–35)
MCHC RBC AUTO-ENTMCNC: 33.1 % (ref 32–34.5)
MCV RBC AUTO: 88.1 FL (ref 80–99.9)
MONOCYTES ABSOLUTE: 0.42 E9/L (ref 0.1–0.95)
MONOCYTES RELATIVE PERCENT: 7.8 % (ref 2–12)
NEUTROPHILS ABSOLUTE: 3.49 E9/L (ref 1.8–7.3)
NEUTROPHILS RELATIVE PERCENT: 64.6 % (ref 43–80)
PDW BLD-RTO: 12.7 FL (ref 11.5–15)
PLATELET # BLD: 139 E9/L (ref 130–450)
PMV BLD AUTO: 10.4 FL (ref 7–12)
POTASSIUM SERPL-SCNC: 4 MMOL/L (ref 3.5–5)
RBC # BLD: 4.04 E12/L (ref 3.8–5.8)
SODIUM BLD-SCNC: 141 MMOL/L (ref 132–146)
TOTAL PROTEIN: 7.1 G/DL (ref 6.4–8.3)
TRIGL SERPL-MCNC: 325 MG/DL (ref 0–149)
TSH SERPL DL<=0.05 MIU/L-ACNC: 0.89 UIU/ML (ref 0.27–4.2)
VLDLC SERPL CALC-MCNC: 65 MG/DL
WBC # BLD: 5.4 E9/L (ref 4.5–11.5)

## 2019-06-04 PROCEDURE — 84443 ASSAY THYROID STIM HORMONE: CPT

## 2019-06-04 PROCEDURE — 80053 COMPREHEN METABOLIC PANEL: CPT

## 2019-06-04 PROCEDURE — 83036 HEMOGLOBIN GLYCOSYLATED A1C: CPT

## 2019-06-04 PROCEDURE — 80061 LIPID PANEL: CPT

## 2019-06-04 PROCEDURE — 85025 COMPLETE CBC W/AUTO DIFF WBC: CPT

## 2019-06-04 PROCEDURE — 99214 OFFICE O/P EST MOD 30 MIN: CPT | Performed by: FAMILY MEDICINE

## 2019-06-04 PROCEDURE — 36415 COLL VENOUS BLD VENIPUNCTURE: CPT

## 2019-06-04 RX ORDER — LISINOPRIL 20 MG/1
TABLET ORAL
COMMUNITY
Start: 2019-04-22 | End: 2019-08-28 | Stop reason: SDUPTHER

## 2019-06-04 ASSESSMENT — PATIENT HEALTH QUESTIONNAIRE - PHQ9
1. LITTLE INTEREST OR PLEASURE IN DOING THINGS: 0
SUM OF ALL RESPONSES TO PHQ QUESTIONS 1-9: 0
SUM OF ALL RESPONSES TO PHQ QUESTIONS 1-9: 0
SUM OF ALL RESPONSES TO PHQ9 QUESTIONS 1 & 2: 0
2. FEELING DOWN, DEPRESSED OR HOPELESS: 0

## 2019-06-04 ASSESSMENT — ENCOUNTER SYMPTOMS
RESPIRATORY NEGATIVE: 1
EYES NEGATIVE: 1
GASTROINTESTINAL NEGATIVE: 1
ALLERGIC/IMMUNOLOGIC NEGATIVE: 1

## 2019-06-04 NOTE — PROGRESS NOTES
19     HOWARD Benites    : 1965 Sex: male   Age: 47 y.o. Chief Complaint   Patient presents with    Hyperlipidemia    Hypertension     still having high readings at home    Diabetes     sugars averaging 140       Prior to Admission medications    Medication Sig Start Date End Date Taking? Authorizing Provider   lisinopril (PRINIVIL;ZESTRIL) 20 MG tablet Take by mouth 19  Yes Omer Franco DO   isosorbide dinitrate (ISORDIL) 20 MG tablet Take 40 mg by mouth 3 times daily 2 tablets 3 times a day 18  Yes Omer Franco DO   metolazone (ZAROXOLYN) 2.5 MG tablet Take 2.5 mg by mouth On Mon, Wed & Fri 6/3/18  Yes Kit Franco,    glipiZIDE (GLUCOTROL) 5 MG tablet Take 0.5 tablets by mouth every morning (before breakfast)  Patient taking differently: Take 5 mg by mouth 4 times daily 2 tablets before breakfast & dinner 18  Yes Fernie Fitzpatrick DO   atorvastatin (LIPITOR) 40 MG tablet Take 1 tablet by mouth nightly 18  Yes Fernie Fitzpatrick DO   metoprolol succinate (TOPROL XL) 50 MG extended release tablet Take 1 tablet by mouth daily  Patient taking differently: Take 50 mg by mouth 2 times daily  18  Yes Fernie Fitzpatrick DO   furosemide (LASIX) 40 MG tablet Take 1 tablet by mouth 2 times daily 18  Yes Karen Garcia,           HPI: Ольга Villalobos presents today medical follow-up issues of diabetes hypertension hyperlipidemia chronic kidney disease cardiomyopathy. Presently doing well with current medications. Pressure improved. Has not followed up with nephrology on chronic kidney disease encouraged to do so. . Blood work to be completed today          Review of Systems   Constitutional: Negative. HENT: Negative. Eyes: Negative. Respiratory: Negative. Gastrointestinal: Negative. Endocrine: Negative. Genitourinary: Negative. Musculoskeletal: Negative. Skin: Negative. Allergic/Immunologic: Negative. Neurological: Negative. Hematological: Negative. Psychiatric/Behavioral: Negative. systems review all stable as noted no current complaints. Current Outpatient Medications:     lisinopril (PRINIVIL;ZESTRIL) 20 MG tablet, Take by mouth, Disp: , Rfl:     isosorbide dinitrate (ISORDIL) 20 MG tablet, Take 40 mg by mouth 3 times daily 2 tablets 3 times a day, Disp: , Rfl:     metolazone (ZAROXOLYN) 2.5 MG tablet, Take 2.5 mg by mouth On Mon, Wed & Fri, Disp: , Rfl:     glipiZIDE (GLUCOTROL) 5 MG tablet, Take 0.5 tablets by mouth every morning (before breakfast) (Patient taking differently: Take 5 mg by mouth 4 times daily 2 tablets before breakfast & dinner), Disp: 30 tablet, Rfl: 0    atorvastatin (LIPITOR) 40 MG tablet, Take 1 tablet by mouth nightly, Disp: 30 tablet, Rfl: 0    metoprolol succinate (TOPROL XL) 50 MG extended release tablet, Take 1 tablet by mouth daily (Patient taking differently: Take 50 mg by mouth 2 times daily ), Disp: 30 tablet, Rfl: 0    furosemide (LASIX) 40 MG tablet, Take 1 tablet by mouth 2 times daily, Disp: 60 tablet, Rfl: 0    No Known Allergies    Social History     Tobacco Use    Smoking status: Never Smoker    Smokeless tobacco: Never Used   Substance Use Topics    Alcohol use:  Yes     Alcohol/week: 1.2 oz     Types: 2 Cans of beer per week    Drug use: No      Past Surgical History:   Procedure Laterality Date    TONSILLECTOMY       Family History   Problem Relation Age of Onset    High Blood Pressure Mother     High Cholesterol Mother     High Blood Pressure Father     Breast Cancer Sister     Hypertension Brother     Hypertension Sister     No Known Problems Brother      Past Medical History:   Diagnosis Date    Cardiomyopathy [facility])     CHF (congestive heart failure) (HCC)     CKD (chronic kidney disease)     HTN (hypertension)     Medically noncompliant     Non-insulin dependent type 2 diabetes mellitus ([facility] [address]     Obesity        Vitals:    06/04/19 5718 06/04/19 3142 BP: (!) 166/80 138/82   Pulse: 58    Temp: 96.8 °F (36 °C)    TempSrc: Temporal    SpO2: 98%    Weight: 204 lb (92.5 kg)    Height: 5' 4\" (1.626 m)      BP Readings from Last 3 Encounters:   06/04/19 138/82   06/25/18 (!) 150/88   06/06/18 (!) 168/92        Physical Exam   Constitutional: He is oriented to person, place, and time. He appears well-developed and well-nourished. HENT:   Head: Normocephalic. Right Ear: External ear normal.   Left Ear: External ear normal.   Nose: Nose normal.   Mouth/Throat: Oropharynx is clear and moist.   Eyes: Pupils are equal, round, and reactive to light. Conjunctivae and EOM are normal.   Neck: Normal range of motion. Neck supple. Cardiovascular: Normal rate and intact distal pulses. Pulmonary/Chest: Breath sounds normal.   Abdominal: Soft. Bowel sounds are normal.   Musculoskeletal: Normal range of motion. Neurological: He is alert and oriented to person, place, and time. Skin: Skin is warm and dry. Psychiatric: He has a normal mood and affect. His behavior is normal.   Nursing note and vitals reviewed. physical exam findings were all stable as noted. Repeat blood pressure stable. Labs to be completed this morning. Reassessment 3 months    Will discuss labs by phone. Plan Per Assessment:  HOWARD was seen today for hyperlipidemia, hypertension and diabetes. Diagnoses and all orders for this visit:    Type 2 diabetes mellitus with stage 3 chronic kidney disease, without long-term current use of insulin (HCC)  -     CBC Auto Differential; Future  -     Comprehensive Metabolic Panel; Future  -     Hemoglobin A1C; Future  -     Lipid Panel; Future  -     TSH without Reflex; Future    Essential hypertension  -     CBC Auto Differential; Future  -     Comprehensive Metabolic Panel; Future  -     Hemoglobin A1C; Future  -     Lipid Panel; Future  -     TSH without Reflex;  Future    Hyperlipidemia, unspecified hyperlipidemia type  -     CBC Auto

## 2019-06-04 NOTE — PROGRESS NOTES
19     HOWARD Benites    : 1965 Sex: male   Age: 47 y.o. Chief Complaint   Patient presents with    Hyperlipidemia    Hypertension     still having high readings at home    Diabetes     sugars averaging 140       Prior to Admission medications    Medication Sig Start Date End Date Taking?  Authorizing Provider   lisinopril (PRINIVIL;ZESTRIL) 20 MG tablet Take by mouth 19  Yes Dulce Franco,    isosorbide dinitrate (ISORDIL) 20 MG tablet Take 40 mg by mouth 3 times daily 2 tablets 3 times a day 18  Yes Dulce Franco DO   metolazone (ZAROXOLYN) 2.5 MG tablet Take 2.5 mg by mouth On Mon, Wed & Fri 6/3/18  Yes Kit Franco,    glipiZIDE (GLUCOTROL) 5 MG tablet Take 0.5 tablets by mouth every morning (before breakfast)  Patient taking differently: Take 5 mg by mouth 4 times daily 2 tablets before breakfast & dinner 18  Yes Fernie Fitzpatrick DO   atorvastatin (LIPITOR) 40 MG tablet Take 1 tablet by mouth nightly 18  Yes Fernie Fitzpatrick DO   metoprolol succinate (TOPROL XL) 50 MG extended release tablet Take 1 tablet by mouth daily  Patient taking differently: Take 50 mg by mouth 2 times daily  18  Yes Fernie Fitzpatrick DO   furosemide (LASIX) 40 MG tablet Take 1 tablet by mouth 2 times daily 18  Yes Fernie Fitzpatrick DO          HPI:           Review of Systems           Current Outpatient Medications:     lisinopril (PRINIVIL;ZESTRIL) 20 MG tablet, Take by mouth, Disp: , Rfl:     isosorbide dinitrate (ISORDIL) 20 MG tablet, Take 40 mg by mouth 3 times daily 2 tablets 3 times a day, Disp: , Rfl:     metolazone (ZAROXOLYN) 2.5 MG tablet, Take 2.5 mg by mouth On Mon, Wed & Fri, Disp: , Rfl:     glipiZIDE (GLUCOTROL) 5 MG tablet, Take 0.5 tablets by mouth every morning (before breakfast) (Patient taking differently: Take 5 mg by mouth 4 times daily 2 tablets before breakfast & dinner), Disp: 30 tablet, Rfl: 0    atorvastatin (LIPITOR) 40 MG tablet, [facility])            Return in about 3 months (around 9/4/2019). Toma Vega DO    Note was generated with the assistance of voice recognition software. Document was reviewed however may contain grammatical errors.

## 2019-06-18 RX ORDER — ISOSORBIDE DINITRATE 20 MG/1
TABLET ORAL
Qty: 180 TABLET | Refills: 3 | Status: SHIPPED | OUTPATIENT
Start: 2019-06-18 | End: 2019-10-30 | Stop reason: SDUPTHER

## 2019-08-28 RX ORDER — LISINOPRIL 20 MG/1
20 TABLET ORAL DAILY
Qty: 30 TABLET | Refills: 3 | Status: SHIPPED | OUTPATIENT
Start: 2019-08-28 | End: 2019-08-29 | Stop reason: SDUPTHER

## 2019-08-29 RX ORDER — LISINOPRIL 20 MG/1
20 TABLET ORAL DAILY
Qty: 30 TABLET | Refills: 3 | Status: SHIPPED | OUTPATIENT
Start: 2019-08-29 | End: 2020-01-30 | Stop reason: SDUPTHER

## 2019-08-30 VITALS
DIASTOLIC BLOOD PRESSURE: 96 MMHG | HEIGHT: 64 IN | SYSTOLIC BLOOD PRESSURE: 158 MMHG | BODY MASS INDEX: 34.66 KG/M2 | TEMPERATURE: 98 F | HEART RATE: 88 BPM | WEIGHT: 203 LBS

## 2019-10-30 RX ORDER — ISOSORBIDE DINITRATE 20 MG/1
TABLET ORAL
Qty: 180 TABLET | Refills: 3 | Status: SHIPPED | OUTPATIENT
Start: 2019-10-30 | End: 2020-02-04 | Stop reason: SDUPTHER

## 2019-10-30 RX ORDER — FUROSEMIDE 40 MG/1
40 TABLET ORAL 2 TIMES DAILY
Qty: 60 TABLET | Refills: 0 | Status: SHIPPED | OUTPATIENT
Start: 2019-10-30 | End: 2020-02-04 | Stop reason: SDUPTHER

## 2019-11-07 RX ORDER — METOLAZONE 2.5 MG/1
2.5 TABLET ORAL DAILY
Qty: 12 TABLET | Refills: 1 | Status: SHIPPED | OUTPATIENT
Start: 2019-11-07 | End: 2020-01-09 | Stop reason: SDUPTHER

## 2019-11-07 RX ORDER — METOPROLOL SUCCINATE 50 MG/1
50 TABLET, EXTENDED RELEASE ORAL 2 TIMES DAILY
Qty: 30 TABLET | Refills: 2 | Status: SHIPPED | OUTPATIENT
Start: 2019-11-07 | End: 2020-02-04 | Stop reason: SDUPTHER

## 2020-01-09 RX ORDER — METOLAZONE 2.5 MG/1
2.5 TABLET ORAL
Qty: 12 TABLET | Refills: 0 | Status: SHIPPED | OUTPATIENT
Start: 2020-01-10 | End: 2020-02-04 | Stop reason: SDUPTHER

## 2020-01-30 RX ORDER — ATORVASTATIN CALCIUM 40 MG/1
40 TABLET, FILM COATED ORAL NIGHTLY
Qty: 30 TABLET | Refills: 0 | Status: SHIPPED | OUTPATIENT
Start: 2020-01-30 | End: 2020-02-04 | Stop reason: SDUPTHER

## 2020-01-30 RX ORDER — LISINOPRIL 20 MG/1
20 TABLET ORAL DAILY
Qty: 30 TABLET | Refills: 3 | Status: SHIPPED | OUTPATIENT
Start: 2020-01-30 | End: 2020-02-04 | Stop reason: SDUPTHER

## 2020-02-04 ENCOUNTER — OFFICE VISIT (OUTPATIENT)
Dept: PRIMARY CARE CLINIC | Age: 55
End: 2020-02-04
Payer: COMMERCIAL

## 2020-02-04 ENCOUNTER — HOSPITAL ENCOUNTER (OUTPATIENT)
Age: 55
Discharge: HOME | End: 2020-02-06
Payer: COMMERCIAL

## 2020-02-04 VITALS
HEART RATE: 67 BPM | OXYGEN SATURATION: 98 % | WEIGHT: 209 LBS | TEMPERATURE: 97.4 F | BODY MASS INDEX: 35.87 KG/M2 | SYSTOLIC BLOOD PRESSURE: 182 MMHG | DIASTOLIC BLOOD PRESSURE: 94 MMHG

## 2020-02-04 LAB
ALBUMIN SERPL-MCNC: 3.8 G/DL (ref 3.5–5.2)
ALP BLD-CCNC: 78 U/L (ref 40–129)
ALT SERPL-CCNC: 27 U/L (ref 0–40)
ANION GAP SERPL CALCULATED.3IONS-SCNC: 15 MMOL/L (ref 7–16)
AST SERPL-CCNC: 23 U/L (ref 0–39)
BASOPHILS ABSOLUTE: 0.06 E9/L (ref 0–0.2)
BASOPHILS RELATIVE PERCENT: 1 % (ref 0–2)
BILIRUB SERPL-MCNC: 0.5 MG/DL (ref 0–1.2)
BUN BLDV-MCNC: 49 MG/DL (ref 6–20)
CALCIUM SERPL-MCNC: 9.3 MG/DL (ref 8.6–10.2)
CHLORIDE BLD-SCNC: 104 MMOL/L (ref 98–107)
CHOLESTEROL, TOTAL: 174 MG/DL (ref 0–199)
CO2: 22 MMOL/L (ref 22–29)
CREAT SERPL-MCNC: 2.1 MG/DL (ref 0.7–1.2)
EOSINOPHILS ABSOLUTE: 0 E9/L (ref 0.05–0.5)
EOSINOPHILS RELATIVE PERCENT: 0 % (ref 0–6)
GFR AFRICAN AMERICAN: 40
GFR NON-AFRICAN AMERICAN: 33 ML/MIN/1.73
GLUCOSE BLD-MCNC: 173 MG/DL (ref 74–99)
HBA1C MFR BLD: 8.7 % (ref 4–5.6)
HCT VFR BLD CALC: 33.1 % (ref 37–54)
HDLC SERPL-MCNC: 33 MG/DL
HEMOGLOBIN: 11 G/DL (ref 12.5–16.5)
IMMATURE GRANULOCYTES #: 0.02 E9/L
IMMATURE GRANULOCYTES %: 0.3 % (ref 0–5)
LDL CHOLESTEROL CALCULATED: 67 MG/DL (ref 0–99)
LYMPHOCYTES ABSOLUTE: 1.59 E9/L (ref 1.5–4)
LYMPHOCYTES RELATIVE PERCENT: 27.4 % (ref 20–42)
MCH RBC QN AUTO: 29.3 PG (ref 26–35)
MCHC RBC AUTO-ENTMCNC: 33.2 % (ref 32–34.5)
MCV RBC AUTO: 88.3 FL (ref 80–99.9)
MONOCYTES ABSOLUTE: 0.52 E9/L (ref 0.1–0.95)
MONOCYTES RELATIVE PERCENT: 9 % (ref 2–12)
NEUTROPHILS ABSOLUTE: 3.61 E9/L (ref 1.8–7.3)
NEUTROPHILS RELATIVE PERCENT: 62.3 % (ref 43–80)
PDW BLD-RTO: 12.8 FL (ref 11.5–15)
PLATELET # BLD: 146 E9/L (ref 130–450)
PMV BLD AUTO: 10.8 FL (ref 7–12)
POTASSIUM SERPL-SCNC: 4.4 MMOL/L (ref 3.5–5)
RBC # BLD: 3.75 E12/L (ref 3.8–5.8)
SODIUM BLD-SCNC: 141 MMOL/L (ref 132–146)
T4 TOTAL: 6.7 MCG/DL (ref 4.5–11.7)
TOTAL PROTEIN: 6.9 G/DL (ref 6.4–8.3)
TRIGL SERPL-MCNC: 372 MG/DL (ref 0–149)
TSH SERPL DL<=0.05 MIU/L-ACNC: 1.11 UIU/ML (ref 0.27–4.2)
VLDLC SERPL CALC-MCNC: 74 MG/DL
WBC # BLD: 5.8 E9/L (ref 4.5–11.5)

## 2020-02-04 PROCEDURE — 84443 ASSAY THYROID STIM HORMONE: CPT

## 2020-02-04 PROCEDURE — 83036 HEMOGLOBIN GLYCOSYLATED A1C: CPT

## 2020-02-04 PROCEDURE — 85025 COMPLETE CBC W/AUTO DIFF WBC: CPT

## 2020-02-04 PROCEDURE — 36415 COLL VENOUS BLD VENIPUNCTURE: CPT

## 2020-02-04 PROCEDURE — 99214 OFFICE O/P EST MOD 30 MIN: CPT | Performed by: FAMILY MEDICINE

## 2020-02-04 PROCEDURE — 80061 LIPID PANEL: CPT

## 2020-02-04 PROCEDURE — 80053 COMPREHEN METABOLIC PANEL: CPT

## 2020-02-04 PROCEDURE — 3052F HG A1C>EQUAL 8.0%<EQUAL 9.0%: CPT | Performed by: FAMILY MEDICINE

## 2020-02-04 PROCEDURE — 84436 ASSAY OF TOTAL THYROXINE: CPT

## 2020-02-04 RX ORDER — FUROSEMIDE 40 MG/1
40 TABLET ORAL 2 TIMES DAILY
Qty: 180 TABLET | Refills: 3 | Status: SHIPPED
Start: 2020-02-04 | End: 2020-08-03 | Stop reason: SDUPTHER

## 2020-02-04 RX ORDER — METOPROLOL SUCCINATE 50 MG/1
50 TABLET, EXTENDED RELEASE ORAL 2 TIMES DAILY
Qty: 180 TABLET | Refills: 3 | Status: SHIPPED
Start: 2020-02-04 | End: 2021-01-25 | Stop reason: SDUPTHER

## 2020-02-04 RX ORDER — ATORVASTATIN CALCIUM 40 MG/1
40 TABLET, FILM COATED ORAL NIGHTLY
Qty: 90 TABLET | Refills: 3 | Status: SHIPPED
Start: 2020-02-04 | End: 2021-01-25 | Stop reason: SDUPTHER

## 2020-02-04 RX ORDER — AMLODIPINE BESYLATE 5 MG/1
5 TABLET ORAL DAILY
Qty: 30 TABLET | Refills: 3 | Status: SHIPPED
Start: 2020-02-04 | End: 2020-08-03 | Stop reason: SDUPTHER

## 2020-02-04 RX ORDER — GLIPIZIDE 5 MG/1
TABLET, FILM COATED, EXTENDED RELEASE ORAL
Qty: 360 TABLET | Refills: 3 | Status: SHIPPED
Start: 2020-02-04 | End: 2021-01-25 | Stop reason: SDUPTHER

## 2020-02-04 RX ORDER — LISINOPRIL 20 MG/1
20 TABLET ORAL DAILY
Qty: 90 TABLET | Refills: 3 | Status: SHIPPED
Start: 2020-02-04 | End: 2020-12-22 | Stop reason: SDUPTHER

## 2020-02-04 RX ORDER — GLIPIZIDE 5 MG/1
5 TABLET, FILM COATED, EXTENDED RELEASE ORAL DAILY
COMMUNITY
End: 2020-02-04 | Stop reason: SDUPTHER

## 2020-02-04 RX ORDER — METOLAZONE 2.5 MG/1
2.5 TABLET ORAL
Qty: 36 TABLET | Refills: 3 | Status: SHIPPED
Start: 2020-02-05 | End: 2021-01-25 | Stop reason: SDUPTHER

## 2020-02-04 RX ORDER — ISOSORBIDE DINITRATE 20 MG/1
TABLET ORAL
Qty: 540 TABLET | Refills: 3 | Status: SHIPPED
Start: 2020-02-04 | End: 2021-01-25 | Stop reason: SDUPTHER

## 2020-02-04 ASSESSMENT — PATIENT HEALTH QUESTIONNAIRE - PHQ9
SUM OF ALL RESPONSES TO PHQ9 QUESTIONS 1 & 2: 0
2. FEELING DOWN, DEPRESSED OR HOPELESS: 0
SUM OF ALL RESPONSES TO PHQ QUESTIONS 1-9: 0
1. LITTLE INTEREST OR PLEASURE IN DOING THINGS: 0
SUM OF ALL RESPONSES TO PHQ QUESTIONS 1-9: 0

## 2020-02-04 ASSESSMENT — ENCOUNTER SYMPTOMS
RESPIRATORY NEGATIVE: 1
GASTROINTESTINAL NEGATIVE: 1
ALLERGIC/IMMUNOLOGIC NEGATIVE: 1
EYES NEGATIVE: 1

## 2020-02-04 NOTE — PROGRESS NOTES
20     KAMALJITFLAQUITOALLI TIMOTEO MouraKamari    : 1965 Sex: male   Age: 49 y.o. Chief Complaint   Patient presents with    Hyperlipidemia    Hypertension    Diabetes       Prior to Admission medications    Medication Sig Start Date End Date Taking? Authorizing Provider   atorvastatin (LIPITOR) 40 MG tablet Take 1 tablet by mouth nightly 20  Yes Millicent Mohan Traikoff, DO   furosemide (LASIX) 40 MG tablet Take 1 tablet by mouth 2 times daily 20  Yes Millicent Mohan Traikoff, DO   isosorbide dinitrate (ISORDIL) 20 MG tablet 2 tablets 3 times a day 20  Yes Millicent Mohan Traikoff, DO   lisinopril (PRINIVIL;ZESTRIL) 20 MG tablet Take 1 tablet by mouth daily 20  Yes Millicent Mohan Traikoff, DO   metOLazone (ZAROXOLYN) 2.5 MG tablet Take 1 tablet by mouth three times a week On Mon, Wed & 20  Yes Millicent Mohan Traikoff, DO   metoprolol succinate (TOPROL XL) 50 MG extended release tablet Take 1 tablet by mouth 2 times daily 20  Yes Millicent Mohan Traikoff, DO   glipiZIDE (GLUCOTROL XL) 5 MG extended release tablet 2 by mouth bid 20  Yes Millicent Mohan Traikoff, DO   amLODIPine (NORVASC) 5 MG tablet Take 1 tablet by mouth daily 20  Yes Millicent Mohan Traikoff, DO   ONE TOUCH LANCETS MISC Test one time daily. Uses mini glucometer   Yes Historical Provider, MD   blood glucose test strips (ASCENSIA AUTODISC VI;ONE TOUCH ULTRA TEST VI) strip Test one time daily. Uses One Touch ultra   Yes Historical Provider, MD          HPI: Patient is seen today in follow-up on diabetes hypertension hyperlipidemia cardiomyopathy. Concerns for uncontrolled blood pressure as well as diabetes. Encouraged better control with diet and exercise. Baseline labs to be completed today. Addition of Norvasc 5 mg daily to the present regimen. Reassess in the next 2 weeks. Review of Systems   Constitutional: Negative. HENT: Negative. Eyes: Negative. Respiratory: Negative. Gastrointestinal: Negative. Endocrine: Negative. Genitourinary: Negative. Musculoskeletal: Negative. Skin: Negative. Allergic/Immunologic: Negative. Neurological: Negative. Hematological: Negative. Psychiatric/Behavioral: Negative. Today systems review is stable as noted. Medications as prescribed. Current Outpatient Medications:     atorvastatin (LIPITOR) 40 MG tablet, Take 1 tablet by mouth nightly, Disp: 90 tablet, Rfl: 3    furosemide (LASIX) 40 MG tablet, Take 1 tablet by mouth 2 times daily, Disp: 180 tablet, Rfl: 3    isosorbide dinitrate (ISORDIL) 20 MG tablet, 2 tablets 3 times a day, Disp: 540 tablet, Rfl: 3    lisinopril (PRINIVIL;ZESTRIL) 20 MG tablet, Take 1 tablet by mouth daily, Disp: 90 tablet, Rfl: 3    [START ON 2/5/2020] metOLazone (ZAROXOLYN) 2.5 MG tablet, Take 1 tablet by mouth three times a week On Mon, Wed & Fri, Disp: 36 tablet, Rfl: 3    metoprolol succinate (TOPROL XL) 50 MG extended release tablet, Take 1 tablet by mouth 2 times daily, Disp: 180 tablet, Rfl: 3    glipiZIDE (GLUCOTROL XL) 5 MG extended release tablet, 2 by mouth bid, Disp: 360 tablet, Rfl: 3    amLODIPine (NORVASC) 5 MG tablet, Take 1 tablet by mouth daily, Disp: 30 tablet, Rfl: 3    ONE TOUCH LANCETS MISC, Test one time daily. Uses mini glucometer, Disp: , Rfl:     blood glucose test strips (ASCENSIA AUTODISC VI;ONE TOUCH ULTRA TEST VI) strip, Test one time daily. Uses One Touch ultra, Disp: , Rfl:     No Known Allergies    Social History     Tobacco Use    Smoking status: Never Smoker    Smokeless tobacco: Never Used   Substance Use Topics    Alcohol use:  Yes     Alcohol/week: 2.0 standard drinks     Types: 2 Cans of beer per week    Drug use: No      Past Surgical History:   Procedure Laterality Date    TONSILLECTOMY       Family History   Problem Relation Age of Onset    High Blood Pressure Mother     High Cholesterol Mother     High Blood Pressure Father     Breast Cancer Sister     Hypertension Brother    [facility]

## 2020-02-18 ENCOUNTER — OFFICE VISIT (OUTPATIENT)
Dept: PRIMARY CARE CLINIC | Age: 55
End: 2020-02-18
Payer: COMMERCIAL

## 2020-02-18 VITALS
TEMPERATURE: 97.6 F | HEART RATE: 75 BPM | SYSTOLIC BLOOD PRESSURE: 166 MMHG | DIASTOLIC BLOOD PRESSURE: 84 MMHG | OXYGEN SATURATION: 98 %

## 2020-02-18 PROCEDURE — 99214 OFFICE O/P EST MOD 30 MIN: CPT | Performed by: FAMILY MEDICINE

## 2020-02-18 PROCEDURE — 3052F HG A1C>EQUAL 8.0%<EQUAL 9.0%: CPT | Performed by: FAMILY MEDICINE

## 2020-02-18 ASSESSMENT — ENCOUNTER SYMPTOMS
EYES NEGATIVE: 1
RESPIRATORY NEGATIVE: 1
GASTROINTESTINAL NEGATIVE: 1
ALLERGIC/IMMUNOLOGIC NEGATIVE: 1

## 2020-02-18 NOTE — PROGRESS NOTES
agreeable to see Dr. Prisca Austin          Review of Systems   Constitutional: Negative. HENT: Negative. Eyes: Negative. Respiratory: Negative. Gastrointestinal: Negative. Endocrine: Negative. Genitourinary: Negative. Musculoskeletal: Negative. Skin: Negative. Allergic/Immunologic: Negative. Neurological: Negative. Hematological: Negative. Psychiatric/Behavioral: Negative. Systems review overall stable no additional complaints. Denies chest pain fatigue or shortness of breath. Current Outpatient Medications:     atorvastatin (LIPITOR) 40 MG tablet, Take 1 tablet by mouth nightly, Disp: 90 tablet, Rfl: 3    furosemide (LASIX) 40 MG tablet, Take 1 tablet by mouth 2 times daily, Disp: 180 tablet, Rfl: 3    isosorbide dinitrate (ISORDIL) 20 MG tablet, 2 tablets 3 times a day, Disp: 540 tablet, Rfl: 3    lisinopril (PRINIVIL;ZESTRIL) 20 MG tablet, Take 1 tablet by mouth daily, Disp: 90 tablet, Rfl: 3    metOLazone (ZAROXOLYN) 2.5 MG tablet, Take 1 tablet by mouth three times a week On Mon, Wed & Fri, Disp: 36 tablet, Rfl: 3    metoprolol succinate (TOPROL XL) 50 MG extended release tablet, Take 1 tablet by mouth 2 times daily, Disp: 180 tablet, Rfl: 3    glipiZIDE (GLUCOTROL XL) 5 MG extended release tablet, 2 by mouth bid, Disp: 360 tablet, Rfl: 3    amLODIPine (NORVASC) 5 MG tablet, Take 1 tablet by mouth daily, Disp: 30 tablet, Rfl: 3    ONE TOUCH LANCETS MISC, Test one time daily. Uses mini glucometer, Disp: , Rfl:     blood glucose test strips (ASCENSIA AUTODISC VI;ONE TOUCH ULTRA TEST VI) strip, Test one time daily. Uses One Touch ultra, Disp: , Rfl:     No Known Allergies    Social History     Tobacco Use    Smoking status: Never Smoker    Smokeless tobacco: Never Used   Substance Use Topics    Alcohol use:  Yes     Alcohol/week: 2.0 standard drinks     Types: 2 Cans of beer per week    Drug use: No      Past Surgical History:   Procedure Laterality Date    TONSILLECTOMY       Family History   Problem Relation Age of Onset    High Blood Pressure Mother     High Cholesterol Mother     High Blood Pressure Father     Breast Cancer Sister     Hypertension Brother     Hypertension Sister     No Known Problems Brother      Past Medical History:   Diagnosis Date    Cardiomyopathy [facility])     CHF (congestive heart failure) (HCC)     CKD (chronic kidney disease)     HTN (hypertension)     Hypercholesterolemia     Medically noncompliant     Non-insulin dependent type 2 diabetes mellitus ([address]     Obesity        Vitals:    02/18/20 1256 02/18/20 1257   BP: (!) 164/82 (!) 166/84   Pulse: 75    Temp: 97.6 °F (36.4 °C)    SpO2: 98%      BP Readings from Last 3 Encounters:   02/18/20 (!) 166/84   02/04/20 (!) 182/94   04/22/19 (!) 158/96    156/82    Physical Exam  Vitals signs and nursing note reviewed. Constitutional:       Appearance: He is well-developed. HENT:      Head: Normocephalic. Right Ear: External ear normal.      Left Ear: External ear normal.      Nose: Nose normal.   Eyes:      Conjunctiva/sclera: Conjunctivae normal.      Pupils: Pupils are equal, round, and reactive to light. Neck:      Musculoskeletal: Normal range of motion and neck supple. Cardiovascular:      Rate and Rhythm: Normal rate. Pulmonary:      Breath sounds: Normal breath sounds. Abdominal:      General: Bowel sounds are normal.      Palpations: Abdomen is soft. Musculoskeletal: Normal range of motion. Skin:     General: Skin is warm and dry. Neurological:      Mental Status: He is alert and oriented to person, place, and time. Psychiatric:         Behavior: Behavior normal.     Blood pressure remains somewhat labile. We will continue with Norvasc 5 mg daily. Arrange cardiology evaluation.   Reassessment 4 weeks  Lab Results   Component Value Date    TSH 1.110 02/04/2020    TSH 0.890 06/04/2019    G4ATIMQ 6.7 02/04/2020     Lab Results may contain grammatical errors.

## 2020-08-03 ENCOUNTER — OFFICE VISIT (OUTPATIENT)
Dept: PRIMARY CARE CLINIC | Age: 55
End: 2020-08-03
Payer: COMMERCIAL

## 2020-08-03 VITALS
DIASTOLIC BLOOD PRESSURE: 80 MMHG | WEIGHT: 201 LBS | BODY MASS INDEX: 34.5 KG/M2 | TEMPERATURE: 97.8 F | HEART RATE: 57 BPM | SYSTOLIC BLOOD PRESSURE: 138 MMHG | OXYGEN SATURATION: 96 %

## 2020-08-03 PROCEDURE — 99214 OFFICE O/P EST MOD 30 MIN: CPT | Performed by: FAMILY MEDICINE

## 2020-08-03 PROCEDURE — 3052F HG A1C>EQUAL 8.0%<EQUAL 9.0%: CPT | Performed by: FAMILY MEDICINE

## 2020-08-03 RX ORDER — AMLODIPINE BESYLATE 5 MG/1
5 TABLET ORAL DAILY
Qty: 30 TABLET | Refills: 5 | Status: SHIPPED
Start: 2020-08-03 | End: 2020-09-14

## 2020-08-03 RX ORDER — SITAGLIPTIN 50 MG/1
50 TABLET, FILM COATED ORAL DAILY
Qty: 30 TABLET | Refills: 3
Start: 2020-08-03 | End: 2020-09-14

## 2020-08-03 RX ORDER — FUROSEMIDE 40 MG/1
40 TABLET ORAL 2 TIMES DAILY
Qty: 60 TABLET | Refills: 5 | Status: SHIPPED
Start: 2020-08-03 | End: 2021-01-25 | Stop reason: SDUPTHER

## 2020-08-03 RX ORDER — EMPAGLIFLOZIN 10 MG/1
10 TABLET, FILM COATED ORAL DAILY
Qty: 30 TABLET | Refills: 3 | Status: SHIPPED
Start: 2020-08-03 | End: 2020-08-03

## 2020-08-03 ASSESSMENT — ENCOUNTER SYMPTOMS
GASTROINTESTINAL NEGATIVE: 1
RESPIRATORY NEGATIVE: 1
ALLERGIC/IMMUNOLOGIC NEGATIVE: 1
EYES NEGATIVE: 1

## 2020-08-03 NOTE — PROGRESS NOTES
8/3/20     KAMALJITFLAQUITOALLI Benites    : 1965 Sex: male   Age: 49 y.o. Chief Complaint   Patient presents with    Hypertension     notices his Bp has been lower lately    Diabetes    Congestive Heart Failure     did not see cardiology        Prior to Admission medications    Medication Sig Start Date End Date Taking? Authorizing Provider   amLODIPine (NORVASC) 5 MG tablet Take 1 tablet by mouth daily 8/3/20  Yes Desiree Franco, DO   furosemide (LASIX) 40 MG tablet Take 1 tablet by mouth 2 times daily 8/3/20  Yes Desiree Franco, DO   JANUVIA 50 MG tablet Take 1 tablet by mouth daily 8/3/20  [facility]   atorvastatin (LIPITOR) 40 MG tablet Take 1 tablet by mouth nightly 20  Yes Desiree Franco, DO   isosorbide dinitrate (ISORDIL) 20 MG tablet 2 tablets 3 times a day 20  [facility]   lisinopril (PRINIVIL;ZESTRIL) 20 MG tablet Take 1 tablet by mouth daily 20  Yes Desiree Franco, DO   metOLazone (ZAROXOLYN) 2.5 MG tablet Take 1 tablet by mouth three times a week On Mon, Wed & 20  Yes Desiree Franco, DO   metoprolol succinate (TOPROL XL) 50 MG extended release tablet Take 1 tablet by mouth 2 times daily 20  Yes Desiree Franco, DO   glipiZIDE (GLUCOTROL XL) 5 MG extended release tablet 2 by mouth bid 20  Yes Desiree Franco, DO   ONE TOUCH LANCETS MISC Test one time daily. Uses mini glucometer   Yes Historical Provider, MD   blood glucose test strips (ASCENSIA AUTODISC VI;ONE TOUCH ULTRA TEST VI) strip Test one time daily. Uses One Touch ultra   Yes Historical Provider, MD          HPI: Romayne Precise is in today medical follow-up on hypertension diabetes hyperlipidemia obesity. Overall medically managing fairly well. Sugars have remained elevated and did add Jardiance today at 10 mg daily. Will reassess again 6 weeks with labs. Systems review overall stable. Review of Systems   Constitutional: Negative. HENT: Negative. is above 45. Plan Per Assessment:  HOWARD was seen today for hypertension, diabetes and congestive heart failure. Diagnoses and all orders for this visit:    Essential hypertension  -     CBC Auto Differential; Future  -     Comprehensive Metabolic Panel; Future  -     Hemoglobin A1C; Future  -     Lipid Panel; Future  -     T4; Future  -     TSH without Reflex; Future    Type 2 diabetes mellitus with stage 3 chronic kidney disease, without long-term current use of insulin (HCC)  -     CBC Auto Differential; Future  -     Comprehensive Metabolic Panel; Future  -     Hemoglobin A1C; Future  -     Lipid Panel; Future  -     T4; Future  -     TSH without Reflex; Future    Hyperlipidemia, unspecified hyperlipidemia type  -     CBC Auto Differential; Future  -     Comprehensive Metabolic Panel; Future  -     Hemoglobin A1C; Future  -     Lipid Panel; Future  -     T4; Future  -     TSH without Reflex; Future    Moderate obesity    Other orders  -     amLODIPine (NORVASC) 5 MG tablet; Take 1 tablet by mouth daily  -     furosemide (LASIX) 40 MG tablet; Take 1 tablet by mouth 2 times daily  -     Discontinue: empagliflozin (JARDIANCE) 10 MG tablet; Take 1 tablet by mouth daily  -     JANUVIA 50 MG tablet; Take 1 tablet by mouth daily            Return in about 6 weeks (around 9/14/2020). Geo Hernandez DO    Note was generated with the assistance of voice recognition software. Document was reviewed however may contain grammatical errors.

## 2020-09-14 ENCOUNTER — OFFICE VISIT (OUTPATIENT)
Dept: PRIMARY CARE CLINIC | Age: 55
End: 2020-09-14
Payer: COMMERCIAL

## 2020-09-14 VITALS
HEART RATE: 55 BPM | WEIGHT: 201 LBS | TEMPERATURE: 95.9 F | DIASTOLIC BLOOD PRESSURE: 80 MMHG | OXYGEN SATURATION: 98 % | BODY MASS INDEX: 34.5 KG/M2 | SYSTOLIC BLOOD PRESSURE: 144 MMHG

## 2020-09-14 PROCEDURE — G8417 CALC BMI ABV UP PARAM F/U: HCPCS | Performed by: FAMILY MEDICINE

## 2020-09-14 PROCEDURE — 99214 OFFICE O/P EST MOD 30 MIN: CPT | Performed by: FAMILY MEDICINE

## 2020-09-14 PROCEDURE — 3052F HG A1C>EQUAL 8.0%<EQUAL 9.0%: CPT | Performed by: FAMILY MEDICINE

## 2020-09-14 PROCEDURE — 2022F DILAT RTA XM EVC RTNOPTHY: CPT | Performed by: FAMILY MEDICINE

## 2020-09-14 PROCEDURE — G8427 DOCREV CUR MEDS BY ELIG CLIN: HCPCS | Performed by: FAMILY MEDICINE

## 2020-09-14 PROCEDURE — 4004F PT TOBACCO SCREEN RCVD TLK: CPT | Performed by: FAMILY MEDICINE

## 2020-09-14 PROCEDURE — 3017F COLORECTAL CA SCREEN DOC REV: CPT | Performed by: FAMILY MEDICINE

## 2020-09-14 RX ORDER — DOXAZOSIN 2 MG/1
TABLET ORAL
Qty: 30 TABLET | Refills: 3 | Status: SHIPPED
Start: 2020-09-14 | End: 2020-12-22

## 2020-09-14 RX ORDER — SITAGLIPTIN 100 MG/1
TABLET, FILM COATED ORAL
COMMUNITY
Start: 2020-08-27 | End: 2021-01-25 | Stop reason: SDUPTHER

## 2020-09-14 NOTE — PROGRESS NOTES
Constitutional: Negative. HENT: Negative. Eyes: Negative. Respiratory: Negative. Gastrointestinal: Negative. Endocrine: Negative. Genitourinary: Negative. Musculoskeletal: Negative. Skin: Negative. Allergic/Immunologic: Negative. Neurological: Negative. Hematological: Negative. Psychiatric/Behavioral: Negative. Systems review as noted is stable no additional complaints. Current Outpatient Medications:     JANUVIA 100 MG tablet, , Disp: , Rfl:     doxazosin (CARDURA) 2 MG tablet, 1 hs, Disp: 30 tablet, Rfl: 3    furosemide (LASIX) 40 MG tablet, Take 1 tablet by mouth 2 times daily, Disp: 60 tablet, Rfl: 5    atorvastatin (LIPITOR) 40 MG tablet, Take 1 tablet by mouth nightly, Disp: 90 tablet, Rfl: 3    isosorbide dinitrate (ISORDIL) 20 MG tablet, 2 tablets 3 times a day, Disp: 540 tablet, Rfl: 3    lisinopril (PRINIVIL;ZESTRIL) 20 MG tablet, Take 1 tablet by mouth daily, Disp: 90 tablet, Rfl: 3    metOLazone (ZAROXOLYN) 2.5 MG tablet, Take 1 tablet by mouth three times a week On Mon, Wed & Fri, Disp: 36 tablet, Rfl: 3    metoprolol succinate (TOPROL XL) 50 MG extended release tablet, Take 1 tablet by mouth 2 times daily, Disp: 180 tablet, Rfl: 3    glipiZIDE (GLUCOTROL XL) 5 MG extended release tablet, 2 by mouth bid, Disp: 360 tablet, Rfl: 3    ONE TOUCH LANCETS MISC, Test one time daily. Uses mini glucometer, Disp: , Rfl:     blood glucose test strips (ASCENSIA AUTODISC VI;ONE TOUCH ULTRA TEST VI) strip, Test one time daily. Uses One Touch ultra, Disp: , Rfl:     No Known Allergies    Social History     Tobacco Use    Smoking status: Never Smoker    Smokeless tobacco: Never Used   Substance Use Topics    Alcohol use:  Yes     Alcohol/week: 2.0 standard drinks     Types: 2 Cans of beer per week    Drug use: No      Past Surgical History:   Procedure Laterality Date    TONSILLECTOMY       Family History   Problem Relation Age of Onset    High Blood for this visit:    Essential hypertension    Type 2 diabetes mellitus with stage 3 chronic kidney disease, without long-term current use of insulin (HCC)    Hyperlipidemia, unspecified hyperlipidemia type    Other orders  -     doxazosin (CARDURA) 2 MG tablet; 1 hs            Return in about 1 month (around 10/14/2020). Roshan Whitney DO    Note was generated with the assistance of voice recognition software. Document was reviewed however may contain grammatical errors.

## 2020-12-22 ENCOUNTER — OFFICE VISIT (OUTPATIENT)
Dept: PRIMARY CARE CLINIC | Age: 55
End: 2020-12-22
Payer: COMMERCIAL

## 2020-12-22 VITALS
WEIGHT: 208 LBS | BODY MASS INDEX: 35.7 KG/M2 | TEMPERATURE: 96.8 F | SYSTOLIC BLOOD PRESSURE: 162 MMHG | DIASTOLIC BLOOD PRESSURE: 92 MMHG

## 2020-12-22 PROCEDURE — G8417 CALC BMI ABV UP PARAM F/U: HCPCS | Performed by: FAMILY MEDICINE

## 2020-12-22 PROCEDURE — 2022F DILAT RTA XM EVC RTNOPTHY: CPT | Performed by: FAMILY MEDICINE

## 2020-12-22 PROCEDURE — G8427 DOCREV CUR MEDS BY ELIG CLIN: HCPCS | Performed by: FAMILY MEDICINE

## 2020-12-22 PROCEDURE — 4004F PT TOBACCO SCREEN RCVD TLK: CPT | Performed by: FAMILY MEDICINE

## 2020-12-22 PROCEDURE — 3052F HG A1C>EQUAL 8.0%<EQUAL 9.0%: CPT | Performed by: FAMILY MEDICINE

## 2020-12-22 PROCEDURE — 3017F COLORECTAL CA SCREEN DOC REV: CPT | Performed by: FAMILY MEDICINE

## 2020-12-22 PROCEDURE — 99214 OFFICE O/P EST MOD 30 MIN: CPT | Performed by: FAMILY MEDICINE

## 2020-12-22 PROCEDURE — G8484 FLU IMMUNIZE NO ADMIN: HCPCS | Performed by: FAMILY MEDICINE

## 2020-12-22 RX ORDER — LISINOPRIL 40 MG/1
TABLET ORAL
Qty: 90 TABLET | Refills: 1 | Status: SHIPPED
Start: 2020-12-22 | End: 2021-01-25 | Stop reason: SDUPTHER

## 2020-12-22 ASSESSMENT — ENCOUNTER SYMPTOMS
ALLERGIC/IMMUNOLOGIC NEGATIVE: 1
RESPIRATORY NEGATIVE: 1
EYES NEGATIVE: 1
GASTROINTESTINAL NEGATIVE: 1

## 2020-12-22 NOTE — PROGRESS NOTES
20     KAMALJITFLAQUITOALLI TIMOTEO MouraKamari    : 1965 Sex: male   Age: 48 y.o. Chief Complaint   Patient presents with    Hypertension    Discuss Medications       Prior to Admission medications    Medication Sig Start Date End Date Taking? Authorizing Provider   lisinopril (PRINIVIL;ZESTRIL) 40 MG tablet 1 qd 20  Yes Regine Franco, DO   JANUVIA 100 MG tablet  20   Historical Provider, MD   furosemide (LASIX) 40 MG tablet Take 1 tablet by mouth 2 times daily 8/3/20   Regine Franco, DO   atorvastatin (LIPITOR) 40 MG tablet Take 1 tablet by mouth nightly 20   Ellen Hutson, DO   isosorbide dinitrate (ISORDIL) 20 MG tablet 2 tablets 3 times a day 20   Ellen Hutson, DO   metOLazone (ZAROXOLYN) 2.5 MG tablet Take 1 tablet by mouth three times a week On Mon, Wed & 20   Regine Franco, DO   metoprolol succinate (TOPROL XL) 50 MG extended release tablet Take 1 tablet by mouth 2 times daily 20   Regine Franco, DO   glipiZIDE (GLUCOTROL XL) 5 MG extended release tablet 2 by mouth bid 20   Ellen Hutson, DO   ONE TOUCH LANCETS MISC Test one time daily. Uses mini glucometer    Historical Provider, MD   blood glucose test strips (ASCENSIA AUTODISC VI;ONE TOUCH ULTRA TEST VI) strip Test one time daily. Uses One Touch ultra    Historical Provider, MD          HPI: Zhane Ortiz is seen today in follow-up on hypertension hyperlipidemia diabetes mellitus. He has been doing fairly well with current medications. Reduced furosemide to once a day. Reduce Zaroxolyn to twice a week. Increased lisinopril to 40 a day. And recheck in 1 month with CMP. Remainder meds as prescribed tighter control on a diet will follow-up next month. Review of Systems   Constitutional: Negative. HENT: Negative. Eyes: Negative. Respiratory: Negative. Gastrointestinal: Negative. Endocrine: Negative. Genitourinary: Negative. Musculoskeletal: Negative. Skin: Negative. Allergic/Immunologic: Negative. Neurological: Negative. Hematological: Negative. Psychiatric/Behavioral: Negative. Current Outpatient Medications:     lisinopril (PRINIVIL;ZESTRIL) 40 MG tablet, 1 qd, Disp: 90 tablet, Rfl: 1    JANUVIA 100 MG tablet, , Disp: , Rfl:     furosemide (LASIX) 40 MG tablet, Take 1 tablet by mouth 2 times daily, Disp: 60 tablet, Rfl: 5    atorvastatin (LIPITOR) 40 MG tablet, Take 1 tablet by mouth nightly, Disp: 90 tablet, Rfl: 3    isosorbide dinitrate (ISORDIL) 20 MG tablet, 2 tablets 3 times a day, Disp: 540 tablet, Rfl: 3    metOLazone (ZAROXOLYN) 2.5 MG tablet, Take 1 tablet by mouth three times a week On Mon, Wed & Fri, Disp: 36 tablet, Rfl: 3    metoprolol succinate (TOPROL XL) 50 MG extended release tablet, Take 1 tablet by mouth 2 times daily, Disp: 180 tablet, Rfl: 3    glipiZIDE (GLUCOTROL XL) 5 MG extended release tablet, 2 by mouth bid, Disp: 360 tablet, Rfl: 3    ONE TOUCH LANCETS MISC, Test one time daily. Uses mini glucometer, Disp: , Rfl:     blood glucose test strips (ASCENSIA AUTODISC VI;ONE TOUCH ULTRA TEST VI) strip, Test one time daily. Uses One Touch ultra, Disp: , Rfl:     No Known Allergies    Social History     Tobacco Use    Smoking status: Never Smoker    Smokeless tobacco: Never Used   Substance Use Topics    Alcohol use:  Yes     Alcohol/week: 2.0 standard drinks     Types: 2 Cans of beer per week    Drug use: No      Past Surgical History:   Procedure Laterality Date    TONSILLECTOMY       Family History   Problem Relation Age of Onset    High Blood Pressure Mother     High Cholesterol Mother     High Blood Pressure Father     Breast Cancer Sister     Hypertension Brother     Hypertension Sister     No Known Problems Brother      Past Medical History:   Diagnosis Date    Cardiomy[facility] ([facility] [address]     CHF (congestive heart failure) (HCC)     CKD (chronic kidney disease)     HTN (hypertension)  Hypercholesterolemia     Medically noncompliant     Non-insulin dependent type 2 diabetes mellitus ([facility] [address]     Obesity        Vitals:    12/22/20 0958 12/22/20 1002   BP: (!) 162/96 (!) 162/92   Temp: 96.8 °F (36 °C)    Weight: 208 lb (94.3 kg)      BP Readings from Last 3 Encounters:   12/22/20 (!) 162/92   09/14/20 (!) 144/80   08/03/20 138/80        Physical Exam  Vitals signs and nursing note reviewed. Constitutional:       Appearance: He is well-developed. HENT:      Head: Normocephalic. Right Ear: External ear normal.      Left Ear: External ear normal.      Nose: Nose normal.   Eyes:      Conjunctiva/sclera: Conjunctivae normal.      Pupils: Pupils are equal, round, and reactive to light. Neck:      Musculoskeletal: Normal range of motion and neck supple. Cardiovascular:      Rate and Rhythm: Normal rate. Pulmonary:      Breath sounds: Normal breath sounds. Abdominal:      General: Bowel sounds are normal.      Palpations: Abdomen is soft. Musculoskeletal: Normal range of motion. Skin:     General: Skin is warm and dry. Neurological:      Mental Status: He is alert and oriented to person, place, and time. Psychiatric:         Behavior: Behavior normal.     Today's vitals physical examination stable. Continue current meds and care. Follow-up with me 4 weeks and sooner if problems. Plan Per Assessment:  HOWARD was seen today for hypertension and discuss medications. Diagnoses and all orders for this visit:    Essential hypertension  -     Comprehensive Metabolic Panel; Future    Pure hypercholesterolemia    Hyperlipidemia, unspecified hyperlipidemia type    Type 2 diabetes mellitus with stage 3b chronic kidney disease, without long-term current use of insulin (HCC)    Other orders  -     lisinopril (PRINIVIL;ZESTRIL) 40 MG tablet; 1 qd            Return in about 1 month (around 1/22/2021).       Brooke Kwon,

## 2021-01-25 ENCOUNTER — OFFICE VISIT (OUTPATIENT)
Dept: PRIMARY CARE CLINIC | Age: 56
End: 2021-01-25
Payer: COMMERCIAL

## 2021-01-25 VITALS
OXYGEN SATURATION: 98 % | BODY MASS INDEX: 34.49 KG/M2 | SYSTOLIC BLOOD PRESSURE: 136 MMHG | HEART RATE: 57 BPM | DIASTOLIC BLOOD PRESSURE: 80 MMHG | HEIGHT: 64 IN | WEIGHT: 202 LBS | TEMPERATURE: 96.6 F

## 2021-01-25 DIAGNOSIS — I10 ESSENTIAL HYPERTENSION: ICD-10-CM

## 2021-01-25 DIAGNOSIS — N18.32 TYPE 2 DIABETES MELLITUS WITH STAGE 3B CHRONIC KIDNEY DISEASE, WITHOUT LONG-TERM CURRENT USE OF INSULIN (HCC): ICD-10-CM

## 2021-01-25 DIAGNOSIS — E78.5 HYPERLIPIDEMIA, UNSPECIFIED HYPERLIPIDEMIA TYPE: ICD-10-CM

## 2021-01-25 DIAGNOSIS — E11.22 TYPE 2 DIABETES MELLITUS WITH STAGE 3B CHRONIC KIDNEY DISEASE, WITHOUT LONG-TERM CURRENT USE OF INSULIN (HCC): ICD-10-CM

## 2021-01-25 DIAGNOSIS — N18.30 TYPE 2 DIABETES MELLITUS WITH STAGE 3 CHRONIC KIDNEY DISEASE, WITHOUT LONG-TERM CURRENT USE OF INSULIN (HCC): ICD-10-CM

## 2021-01-25 DIAGNOSIS — I42.8 NONISCHEMIC CARDIOMYOPATHY (HCC): Primary | ICD-10-CM

## 2021-01-25 DIAGNOSIS — E11.22 TYPE 2 DIABETES MELLITUS WITH STAGE 3 CHRONIC KIDNEY DISEASE, WITHOUT LONG-TERM CURRENT USE OF INSULIN (HCC): ICD-10-CM

## 2021-01-25 LAB
ALBUMIN SERPL-MCNC: 4.4 G/DL (ref 3.5–5.2)
ALP BLD-CCNC: 120 U/L (ref 40–129)
ALT SERPL-CCNC: 22 U/L (ref 0–40)
ANION GAP SERPL CALCULATED.3IONS-SCNC: 20 MMOL/L (ref 7–16)
AST SERPL-CCNC: 17 U/L (ref 0–39)
BASOPHILS ABSOLUTE: 0.06 E9/L (ref 0–0.2)
BASOPHILS RELATIVE PERCENT: 0.9 % (ref 0–2)
BILIRUB SERPL-MCNC: 0.3 MG/DL (ref 0–1.2)
BUN BLDV-MCNC: 86 MG/DL (ref 6–20)
CALCIUM SERPL-MCNC: 9.2 MG/DL (ref 8.6–10.2)
CHLORIDE BLD-SCNC: 103 MMOL/L (ref 98–107)
CHOLESTEROL, TOTAL: 191 MG/DL (ref 0–199)
CO2: 19 MMOL/L (ref 22–29)
CREAT SERPL-MCNC: 3.1 MG/DL (ref 0.7–1.2)
EOSINOPHILS ABSOLUTE: 0 E9/L (ref 0.05–0.5)
EOSINOPHILS RELATIVE PERCENT: 0 % (ref 0–6)
GFR AFRICAN AMERICAN: 26
GFR NON-AFRICAN AMERICAN: 21 ML/MIN/1.73
GLUCOSE BLD-MCNC: 173 MG/DL (ref 74–99)
HBA1C MFR BLD: 7.6 % (ref 4–5.6)
HCT VFR BLD CALC: 31.5 % (ref 37–54)
HDLC SERPL-MCNC: 22 MG/DL
HEMOGLOBIN: 10.4 G/DL (ref 12.5–16.5)
IMMATURE GRANULOCYTES #: 0.01 E9/L
IMMATURE GRANULOCYTES %: 0.2 % (ref 0–5)
LDL CHOLESTEROL CALCULATED: ABNORMAL MG/DL (ref 0–99)
LYMPHOCYTES ABSOLUTE: 1.61 E9/L (ref 1.5–4)
LYMPHOCYTES RELATIVE PERCENT: 25.1 % (ref 20–42)
MCH RBC QN AUTO: 29.2 PG (ref 26–35)
MCHC RBC AUTO-ENTMCNC: 33 % (ref 32–34.5)
MCV RBC AUTO: 88.5 FL (ref 80–99.9)
MONOCYTES ABSOLUTE: 0.52 E9/L (ref 0.1–0.95)
MONOCYTES RELATIVE PERCENT: 8.1 % (ref 2–12)
NEUTROPHILS ABSOLUTE: 4.21 E9/L (ref 1.8–7.3)
NEUTROPHILS RELATIVE PERCENT: 65.7 % (ref 43–80)
PDW BLD-RTO: 12.4 FL (ref 11.5–15)
PLATELET # BLD: 146 E9/L (ref 130–450)
PMV BLD AUTO: 10.6 FL (ref 7–12)
POTASSIUM SERPL-SCNC: 4.5 MMOL/L (ref 3.5–5)
RBC # BLD: 3.56 E12/L (ref 3.8–5.8)
SODIUM BLD-SCNC: 142 MMOL/L (ref 132–146)
T4 TOTAL: 5.6 MCG/DL (ref 4.5–11.7)
TOTAL PROTEIN: 7.2 G/DL (ref 6.4–8.3)
TRIGL SERPL-MCNC: 667 MG/DL (ref 0–149)
TSH SERPL DL<=0.05 MIU/L-ACNC: 0.87 UIU/ML (ref 0.27–4.2)
VLDLC SERPL CALC-MCNC: ABNORMAL MG/DL
WBC # BLD: 6.4 E9/L (ref 4.5–11.5)

## 2021-01-25 PROCEDURE — 2022F DILAT RTA XM EVC RTNOPTHY: CPT | Performed by: FAMILY MEDICINE

## 2021-01-25 PROCEDURE — 3046F HEMOGLOBIN A1C LEVEL >9.0%: CPT | Performed by: FAMILY MEDICINE

## 2021-01-25 PROCEDURE — 4004F PT TOBACCO SCREEN RCVD TLK: CPT | Performed by: FAMILY MEDICINE

## 2021-01-25 PROCEDURE — G8427 DOCREV CUR MEDS BY ELIG CLIN: HCPCS | Performed by: FAMILY MEDICINE

## 2021-01-25 PROCEDURE — G8417 CALC BMI ABV UP PARAM F/U: HCPCS | Performed by: FAMILY MEDICINE

## 2021-01-25 PROCEDURE — G8484 FLU IMMUNIZE NO ADMIN: HCPCS | Performed by: FAMILY MEDICINE

## 2021-01-25 PROCEDURE — 3017F COLORECTAL CA SCREEN DOC REV: CPT | Performed by: FAMILY MEDICINE

## 2021-01-25 PROCEDURE — 99214 OFFICE O/P EST MOD 30 MIN: CPT | Performed by: FAMILY MEDICINE

## 2021-01-25 RX ORDER — SITAGLIPTIN 100 MG/1
100 TABLET, FILM COATED ORAL DAILY
Qty: 30 TABLET | Refills: 5 | Status: SHIPPED
Start: 2021-01-25 | End: 2021-07-22 | Stop reason: SDUPTHER

## 2021-01-25 RX ORDER — FUROSEMIDE 40 MG/1
40 TABLET ORAL 2 TIMES DAILY
Qty: 60 TABLET | Refills: 5 | Status: SHIPPED
Start: 2021-01-25 | End: 2021-07-22 | Stop reason: SDUPTHER

## 2021-01-25 RX ORDER — METOPROLOL SUCCINATE 50 MG/1
50 TABLET, EXTENDED RELEASE ORAL 2 TIMES DAILY
Qty: 180 TABLET | Refills: 3 | Status: SHIPPED
Start: 2021-01-25 | End: 2021-11-10 | Stop reason: SDUPTHER

## 2021-01-25 RX ORDER — ISOSORBIDE DINITRATE 20 MG/1
TABLET ORAL
Qty: 540 TABLET | Refills: 3 | Status: SHIPPED
Start: 2021-01-25 | End: 2021-09-03 | Stop reason: SDUPTHER

## 2021-01-25 RX ORDER — METOLAZONE 2.5 MG/1
2.5 TABLET ORAL
Qty: 36 TABLET | Refills: 3 | Status: SHIPPED
Start: 2021-01-25 | End: 2021-03-11

## 2021-01-25 RX ORDER — LISINOPRIL 40 MG/1
TABLET ORAL
Qty: 90 TABLET | Refills: 1 | Status: SHIPPED
Start: 2021-01-25 | End: 2021-07-22 | Stop reason: SDUPTHER

## 2021-01-25 RX ORDER — ATORVASTATIN CALCIUM 40 MG/1
40 TABLET, FILM COATED ORAL NIGHTLY
Qty: 90 TABLET | Refills: 3 | Status: SHIPPED
Start: 2021-01-25 | End: 2022-02-16 | Stop reason: SDUPTHER

## 2021-01-25 RX ORDER — GLIPIZIDE 5 MG/1
TABLET, FILM COATED, EXTENDED RELEASE ORAL
Qty: 360 TABLET | Refills: 3 | Status: SHIPPED
Start: 2021-01-25 | End: 2021-09-03 | Stop reason: SDUPTHER

## 2021-01-25 ASSESSMENT — PATIENT HEALTH QUESTIONNAIRE - PHQ9
SUM OF ALL RESPONSES TO PHQ QUESTIONS 1-9: 0
SUM OF ALL RESPONSES TO PHQ QUESTIONS 1-9: 0
1. LITTLE INTEREST OR PLEASURE IN DOING THINGS: 0
SUM OF ALL RESPONSES TO PHQ QUESTIONS 1-9: 0
SUM OF ALL RESPONSES TO PHQ9 QUESTIONS 1 & 2: 0
2. FEELING DOWN, DEPRESSED OR HOPELESS: 0

## 2021-01-25 ASSESSMENT — ENCOUNTER SYMPTOMS
GASTROINTESTINAL NEGATIVE: 1
ALLERGIC/IMMUNOLOGIC NEGATIVE: 1
EYES NEGATIVE: 1
RESPIRATORY NEGATIVE: 1

## 2021-01-25 NOTE — PROGRESS NOTES
21     HOWARD Burkettelo    : 1965 Sex: male   Age: 48 y.o. Chief Complaint   Patient presents with    Hypertension    Diabetes       Prior to Admission medications    Medication Sig Start Date End Date Taking? Authorizing Provider   JANUVIA 100 MG tablet Take 1 tablet by mouth daily 21  Yes Jed Franco,    metoprolol succinate (TOPROL XL) 50 MG extended release tablet Take 1 tablet by mouth 2 times daily 21  Yes Jed Franco, DO   atorvastatin (LIPITOR) 40 MG tablet Take 1 tablet by mouth nightly 21  Yes Jed Franco, DO   furosemide (LASIX) 40 MG tablet Take 1 tablet by mouth 2 times daily 21  Yes Jed Franco, DO   glipiZIDE (GLUCOTROL XL) 5 MG extended release tablet 2 by mouth bid 21  Yes Jed Franco, DO   isosorbide dinitrate (ISORDIL) 20 MG tablet 2 tablets 3 times a day 21  Yes Jed Franco, DO   lisinopril (PRINIVIL;ZESTRIL) 40 MG tablet 1 qd 21  Yes Jed Franco DO   metOLazone (ZAROXOLYN) 2.5 MG tablet Take 1 tablet by mouth three times a week On Mon, Wed & Fri 21  Yes Jed Franco, DO   ONE TOUCH LANCETS MISC Test one time daily. Uses mini glucometer   Yes Historical Provider, MD   blood glucose test strips (ASCENSIA AUTODISC VI;ONE TOUCH ULTRA TEST VI) strip Test one time daily. Uses One Touch ultra   Yes Historical Provider, MD          HPI: Patient evaluated today with nonischemic cardiomyopathy hypertension diabetes hyperlipidemia all of which has been stable. Medications currently well-tolerated. Labs have not been completed so will complete this afternoon. Reassess within the next month. Medications reviewed maintain as prescribed. Home sugars have been stable by history. Review of Systems   Constitutional: Negative. HENT: Negative. Eyes: Negative. Respiratory: Negative. Gastrointestinal: Negative. Endocrine: Negative. Genitourinary: Negative. Musculoskeletal: Negative. Skin: Negative. Allergic/Immunologic: Negative. Neurological: Negative. Hematological: Negative. Psychiatric/Behavioral: Negative. Systems review is stable. Medications well-tolerated. Blood pressure improved to 136/80. Current Outpatient Medications:     JANUVIA 100 MG tablet, Take 1 tablet by mouth daily, Disp: 30 tablet, Rfl: 5    metoprolol succinate (TOPROL XL) 50 MG extended release tablet, Take 1 tablet by mouth 2 times daily, Disp: 180 tablet, Rfl: 3    atorvastatin (LIPITOR) 40 MG tablet, Take 1 tablet by mouth nightly, Disp: 90 tablet, Rfl: 3    furosemide (LASIX) 40 MG tablet, Take 1 tablet by mouth 2 times daily, Disp: 60 tablet, Rfl: 5    glipiZIDE (GLUCOTROL XL) 5 MG extended release tablet, 2 by mouth bid, Disp: 360 tablet, Rfl: 3    isosorbide dinitrate (ISORDIL) 20 MG tablet, 2 tablets 3 times a day, Disp: 540 tablet, Rfl: 3    lisinopril (PRINIVIL;ZESTRIL) 40 MG tablet, 1 qd, Disp: 90 tablet, Rfl: 1    metOLazone (ZAROXOLYN) 2.5 MG tablet, Take 1 tablet by mouth three times a week On Mon, Wed & Fri, Disp: 36 tablet, Rfl: 3    ONE TOUCH LANCETS MISC, Test one time daily. Uses mini glucometer, Disp: , Rfl:     blood glucose test strips (ASCENSIA AUTODISC VI;ONE TOUCH ULTRA TEST VI) strip, Test one time daily. Uses One Touch ultra, Disp: , Rfl:     No Known Allergies    Social History     Tobacco Use    Smoking status: Never Smoker    Smokeless tobacco: Never Used   Substance Use Topics    Alcohol use:  Yes     Alcohol/week: 2.0 standard drinks     Types: 2 Cans of beer per week    Drug use: No      Past Surgical History:   Procedure Laterality Date    TONSILLECTOMY       Family History   Problem Relation Age of Onset    High Blood Pressure Mother     High Cholesterol Mother     High Blood Pressure Father     Breast Cancer Sister     Hypertension Brother     Hypertension Sister     No Known Problems Brother Past Medical History:   Diagnosis Date    Cardiomyopathy [facility])     CHF (congestive heart failure) (HCC)     CKD (chronic kidney disease)     HTN (hypertension)     Hypercholesterolemia     Medically noncompliant     Non-insulin dependent type 2 diabetes mellitus ([address]     Obesity        Vitals:    01/25/21 1157   BP: 136/80   Pulse: 57   Temp: 96.6 °F (35.9 °C)   SpO2: 98%   Weight: 202 lb (91.6 kg)   Height: 5' 4.25\" (1.632 m)     BP Readings from Last 3 Encounters:   01/25/21 136/80   12/22/20 (!) 162/92   09/14/20 (!) 144/80        Physical Exam  Vitals signs and nursing note reviewed. Constitutional:       Appearance: He is well-developed. HENT:      Head: Normocephalic. Right Ear: External ear normal.      Left Ear: External ear normal.      Nose: Nose normal.   Eyes:      Conjunctiva/sclera: Conjunctivae normal.      Pupils: Pupils are equal, round, and reactive to light. Neck:      Musculoskeletal: Normal range of motion and neck supple. Cardiovascular:      Rate and Rhythm: Normal rate. Pulmonary:      Breath sounds: Normal breath sounds. Abdominal:      General: Bowel sounds are normal.      Palpations: Abdomen is soft. Musculoskeletal: Normal range of motion. Skin:     General: Skin is warm and dry. Neurological:      Mental Status: He is alert and oriented to person, place, and time. Psychiatric:         Behavior: Behavior normal.     Current vitals physical examination stable. Meds as prescribed. Follow-up in 4 weeks and sooner if problems. Plan Per Assessment:  HOWARD was seen today for hypertension and diabetes. Diagnoses and all orders for this visit:    Nonischemic cardiomyopathy ([address]    Essential hypertension    Type 2 diabetes mellitus with stage 3b chronic kidney disease, without long-term current use of insulin ([address]    Hyperlipidemia, unspecified hyperlipidemia type    Other orders  -     JANUVIA 100 MG tablet;  Take 1 tablet by mouth daily -     metoprolol succinate (TOPROL XL) 50 MG extended release tablet; Take 1 tablet by mouth 2 times daily  -     atorvastatin (LIPITOR) 40 MG tablet; Take 1 tablet by mouth nightly  -     furosemide (LASIX) 40 MG tablet; Take 1 tablet by mouth 2 times daily  -     glipiZIDE (GLUCOTROL XL) 5 MG extended release tablet; 2 by mouth bid  -     isosorbide dinitrate (ISORDIL) 20 MG tablet; 2 tablets 3 times a day  -     lisinopril (PRINIVIL;ZESTRIL) 40 MG tablet; 1 qd  -     metOLazone (ZAROXOLYN) 2.5 MG tablet; Take 1 tablet by mouth three times a week On Mon, Wed & Fri            Return in about 4 weeks (around 2/22/2021). Tara Nguyen DO    Note was generated with the assistance of voice recognition software. Document was reviewed however may contain grammatical errors.

## 2021-02-25 LAB
LEFT VENTRICULAR EJECTION FRACTION MODE: NORMAL
LV EF: NORMAL %

## 2021-03-11 ENCOUNTER — OFFICE VISIT (OUTPATIENT)
Dept: PRIMARY CARE CLINIC | Age: 56
End: 2021-03-11
Payer: COMMERCIAL

## 2021-03-11 VITALS
TEMPERATURE: 96.6 F | BODY MASS INDEX: 34.06 KG/M2 | WEIGHT: 200 LBS | HEART RATE: 53 BPM | DIASTOLIC BLOOD PRESSURE: 80 MMHG | OXYGEN SATURATION: 99 % | SYSTOLIC BLOOD PRESSURE: 130 MMHG

## 2021-03-11 DIAGNOSIS — I10 ESSENTIAL HYPERTENSION: Primary | ICD-10-CM

## 2021-03-11 DIAGNOSIS — E78.5 HYPERLIPIDEMIA, UNSPECIFIED HYPERLIPIDEMIA TYPE: ICD-10-CM

## 2021-03-11 DIAGNOSIS — N18.32 TYPE 2 DIABETES MELLITUS WITH STAGE 3B CHRONIC KIDNEY DISEASE, WITHOUT LONG-TERM CURRENT USE OF INSULIN (HCC): ICD-10-CM

## 2021-03-11 DIAGNOSIS — E11.22 TYPE 2 DIABETES MELLITUS WITH STAGE 3B CHRONIC KIDNEY DISEASE, WITHOUT LONG-TERM CURRENT USE OF INSULIN (HCC): ICD-10-CM

## 2021-03-11 DIAGNOSIS — I42.8 NONISCHEMIC CARDIOMYOPATHY (HCC): ICD-10-CM

## 2021-03-11 PROCEDURE — 4004F PT TOBACCO SCREEN RCVD TLK: CPT | Performed by: FAMILY MEDICINE

## 2021-03-11 PROCEDURE — 2022F DILAT RTA XM EVC RTNOPTHY: CPT | Performed by: FAMILY MEDICINE

## 2021-03-11 PROCEDURE — 3017F COLORECTAL CA SCREEN DOC REV: CPT | Performed by: FAMILY MEDICINE

## 2021-03-11 PROCEDURE — G8417 CALC BMI ABV UP PARAM F/U: HCPCS | Performed by: FAMILY MEDICINE

## 2021-03-11 PROCEDURE — G8484 FLU IMMUNIZE NO ADMIN: HCPCS | Performed by: FAMILY MEDICINE

## 2021-03-11 PROCEDURE — 3051F HG A1C>EQUAL 7.0%<8.0%: CPT | Performed by: FAMILY MEDICINE

## 2021-03-11 PROCEDURE — 99214 OFFICE O/P EST MOD 30 MIN: CPT | Performed by: FAMILY MEDICINE

## 2021-03-11 PROCEDURE — G8427 DOCREV CUR MEDS BY ELIG CLIN: HCPCS | Performed by: FAMILY MEDICINE

## 2021-03-11 ASSESSMENT — ENCOUNTER SYMPTOMS
RESPIRATORY NEGATIVE: 1
EYES NEGATIVE: 1
ALLERGIC/IMMUNOLOGIC NEGATIVE: 1
GASTROINTESTINAL NEGATIVE: 1

## 2021-03-11 NOTE — PROGRESS NOTES
3/11/21     HOWARD Burkettelo    : 1965 Sex: male   Age: 48 y.o. Chief Complaint   Patient presents with    Discuss Labs    Cough     dry cough x 3-4 weeks       Prior to Admission medications    Medication Sig Start Date End Date Taking? Authorizing Provider   JANUVIA 100 MG tablet Take 1 tablet by mouth daily 21  Yes Roxann Franco, DO   metoprolol succinate (TOPROL XL) 50 MG extended release tablet Take 1 tablet by mouth 2 times daily 21  Yes Roxann Franco, DO   atorvastatin (LIPITOR) 40 MG tablet Take 1 tablet by mouth nightly 21  Yes Roxann Franco, DO   furosemide (LASIX) 40 MG tablet Take 1 tablet by mouth 2 times daily 21  Yes Roxann Franco, DO   glipiZIDE (GLUCOTROL XL) 5 MG extended release tablet 2 by mouth bid 21  Yes Roxann Franco, DO   isosorbide dinitrate (ISORDIL) 20 MG tablet 2 tablets 3 times a day 21  Yes Roxann Franco, DO   lisinopril (PRINIVIL;ZESTRIL) 40 MG tablet 1 qd 21  Yes Roxann Franco, DO   ONE TOUCH LANCETS MISC Test one time daily. Uses mini glucometer   Yes Historical Provider, MD   blood glucose test strips (ASCENSIA AUTODISC VI;ONE TOUCH ULTRA TEST VI) strip Test one time daily. Uses One Touch ultra   Yes Historical Provider, MD          HPI: Patient seen today in follow-up on hypertension cardiomyopathy diabetes hyperlipidemia. I did review his echocardiogram is actually an improvement 45 to 50% on the ejection fraction. Physically he seems to feel well. Will be following up with cardiology I believe in . Labs will be completed at that time and follow-up visit at that time. Hypertension controlled diabetes stable hyperlipidemia and meds as prescribed. Noted complaint of dry cough and we did discuss his lisinopril if symptoms persist we would consider an alternative med. Review of Systems   Constitutional: Negative. HENT: Negative. Eyes: Negative. Respiratory: Negative. Gastrointestinal: Negative. Endocrine: Negative. Genitourinary: Negative. Musculoskeletal: Negative. Skin: Negative. Allergic/Immunologic: Negative. Neurological: Negative. Hematological: Negative. Psychiatric/Behavioral: Negative. Systems review overall stable aside from intermittent dry cough. Known history of allergies. Current Outpatient Medications:     JANUVIA 100 MG tablet, Take 1 tablet by mouth daily, Disp: 30 tablet, Rfl: 5    metoprolol succinate (TOPROL XL) 50 MG extended release tablet, Take 1 tablet by mouth 2 times daily, Disp: 180 tablet, Rfl: 3    atorvastatin (LIPITOR) 40 MG tablet, Take 1 tablet by mouth nightly, Disp: 90 tablet, Rfl: 3    furosemide (LASIX) 40 MG tablet, Take 1 tablet by mouth 2 times daily, Disp: 60 tablet, Rfl: 5    glipiZIDE (GLUCOTROL XL) 5 MG extended release tablet, 2 by mouth bid, Disp: 360 tablet, Rfl: 3    isosorbide dinitrate (ISORDIL) 20 MG tablet, 2 tablets 3 times a day, Disp: 540 tablet, Rfl: 3    lisinopril (PRINIVIL;ZESTRIL) 40 MG tablet, 1 qd, Disp: 90 tablet, Rfl: 1    ONE TOUCH LANCETS MISC, Test one time daily. Uses mini glucometer, Disp: , Rfl:     blood glucose test strips (ASCENSIA AUTODISC VI;ONE TOUCH ULTRA TEST VI) strip, Test one time daily. Uses One Touch ultra, Disp: , Rfl:     No Known Allergies    Social History     Tobacco Use    Smoking status: Never Smoker    Smokeless tobacco: Never Used   Substance Use Topics    Alcohol use:  Yes     Alcohol/week: 2.0 standard drinks     Types: 2 Cans of beer per week    Drug use: No      Past Surgical History:   Procedure Laterality Date    TONSILLECTOMY       Family History   Problem Relation Age of Onset    High Blood Pressure Mother     High Cholesterol Mother     High Blood Pressure Father     Breast Cancer Sister     Hypertension Brother     Hypertension Sister     No Known Problems Brother      Past Medical History:   Diagnosis Date    [facility]     CHF [facility]congestive heart failure) ([facility]     CKD (chronic kidney disease)     HTN (hypertension)     Hypercholesterolemia     Medically noncompliant     Non-insulin dependent type 2 diabetes mellitus ([address]     Obesity        Vitals:    03/11/21 1430   BP: 130/80   Pulse: 53   Temp: 96.6 °F (35.9 °C)   SpO2: 99%   Weight: 200 lb (90.7 kg)     BP Readings from Last 3 Encounters:   03/11/21 130/80   01/25/21 136/80   12/22/20 (!) 162/92    120/78    Physical Exam  Vitals signs and nursing note reviewed. Constitutional:       Appearance: He is well-developed. HENT:      Head: Normocephalic. Right Ear: External ear normal.      Left Ear: External ear normal.      Nose: Nose normal.   Eyes:      Conjunctiva/sclera: Conjunctivae normal.      Pupils: Pupils are equal, round, and reactive to light. Neck:      Musculoskeletal: Normal range of motion and neck supple. Cardiovascular:      Rate and Rhythm: Normal rate. Pulmonary:      Breath sounds: Normal breath sounds. Abdominal:      General: Bowel sounds are normal.      Palpations: Abdomen is soft. Musculoskeletal: Normal range of motion. Skin:     General: Skin is warm and dry. Neurological:      Mental Status: He is alert and oriented to person, place, and time. Psychiatric:         Behavior: Behavior normal.        Today's vitals physical examination stable. I will maintain present meds and care. Reassessment 3 months with lab work at that time. Plan Per Assessment:  HOWARD was seen today for discuss labs and cough. Diagnoses and all orders for this visit:    Essential hypertension  -     CBC Auto Differential; Future  -     Comprehensive Metabolic Panel; Future  -     Hemoglobin A1C; Future  -     Lipid Panel; Future  -     T4; Future  -     TSH without Reflex; Future    Nonischemic cardiomyopathy (HCC)  -     CBC Auto Differential; Future  -     Comprehensive Metabolic Panel;  Future  - Hemoglobin A1C; Future  -     Lipid Panel; Future  -     T4; Future  -     TSH without Reflex; Future    Type 2 diabetes mellitus with stage 3b chronic kidney disease, without long-term current use of insulin (HCC)  -     CBC Auto Differential; Future  -     Comprehensive Metabolic Panel; Future  -     Hemoglobin A1C; Future  -     Lipid Panel; Future  -     T4; Future  -     TSH without Reflex; Future    Hyperlipidemia, unspecified hyperlipidemia type  -     CBC Auto Differential; Future  -     Comprehensive Metabolic Panel; Future  -     Hemoglobin A1C; Future  -     Lipid Panel; Future  -     T4; Future  -     TSH without Reflex; Future            No follow-ups on file. Shweta Rodriguez DO    Note was generated with the assistance of voice recognition software. Document was reviewed however may contain grammatical errors.

## 2021-03-23 ENCOUNTER — IMMUNIZATION (OUTPATIENT)
Dept: PRIMARY CARE CLINIC | Age: 56
End: 2021-03-23
Payer: COMMERCIAL

## 2021-03-23 PROCEDURE — 91300 COVID-19, PFIZER VACCINE 30MCG/0.3ML DOSE: CPT | Performed by: INTERNAL MEDICINE

## 2021-03-23 PROCEDURE — 0001A COVID-19, PFIZER VACCINE 30MCG/0.3ML DOSE: CPT | Performed by: INTERNAL MEDICINE

## 2021-04-13 ENCOUNTER — IMMUNIZATION (OUTPATIENT)
Dept: PRIMARY CARE CLINIC | Age: 56
End: 2021-04-13
Payer: COMMERCIAL

## 2021-04-13 PROCEDURE — 0002A COVID-19, PFIZER VACCINE 30MCG/0.3ML DOSE: CPT | Performed by: NURSE PRACTITIONER

## 2021-04-13 PROCEDURE — 91300 COVID-19, PFIZER VACCINE 30MCG/0.3ML DOSE: CPT | Performed by: NURSE PRACTITIONER

## 2021-04-29 LAB — DIABETIC RETINOPATHY: POSITIVE

## 2021-07-22 RX ORDER — SITAGLIPTIN 100 MG/1
100 TABLET, FILM COATED ORAL DAILY
Qty: 30 TABLET | Refills: 5 | Status: SHIPPED
Start: 2021-07-22 | End: 2022-01-20 | Stop reason: SDUPTHER

## 2021-07-22 RX ORDER — FUROSEMIDE 40 MG/1
40 TABLET ORAL 2 TIMES DAILY
Qty: 60 TABLET | Refills: 5 | Status: SHIPPED
Start: 2021-07-22 | End: 2022-01-20 | Stop reason: SDUPTHER

## 2021-07-22 RX ORDER — LISINOPRIL 40 MG/1
TABLET ORAL
Qty: 90 TABLET | Refills: 1 | Status: SHIPPED
Start: 2021-07-22 | End: 2021-09-03

## 2021-08-04 ENCOUNTER — TELEPHONE (OUTPATIENT)
Dept: PRIMARY CARE CLINIC | Age: 56
End: 2021-08-04

## 2021-08-04 DIAGNOSIS — I42.8 NONISCHEMIC CARDIOMYOPATHY (HCC): ICD-10-CM

## 2021-08-04 DIAGNOSIS — N18.32 TYPE 2 DIABETES MELLITUS WITH STAGE 3B CHRONIC KIDNEY DISEASE, WITHOUT LONG-TERM CURRENT USE OF INSULIN (HCC): ICD-10-CM

## 2021-08-04 DIAGNOSIS — E11.22 TYPE 2 DIABETES MELLITUS WITH STAGE 3B CHRONIC KIDNEY DISEASE, WITHOUT LONG-TERM CURRENT USE OF INSULIN (HCC): ICD-10-CM

## 2021-08-04 DIAGNOSIS — I10 ESSENTIAL HYPERTENSION: ICD-10-CM

## 2021-08-04 DIAGNOSIS — E78.5 HYPERLIPIDEMIA, UNSPECIFIED HYPERLIPIDEMIA TYPE: ICD-10-CM

## 2021-08-04 NOTE — TELEPHONE ENCOUNTER
Left message for pt to return call to clarify what health management plan is. If needs surgical clearance just needs scheduled.

## 2021-08-04 NOTE — TELEPHONE ENCOUNTER
----- Message from Charles Patel sent at 8/3/2021  1:51 PM EDT -----  Subject: Message to Provider    QUESTIONS  Information for Provider? Patient needs an [facility] (health management plan)   for his eye surgery he is having on 8/16/2021. Please call patient and   advise on how he could get this done.   ---------------------------------------------------------------------------  --------------  CALL BACK INFO  What is the best way for the office to contact you? OK to leave message on   voicemail  Preferred Call Back Phone Number? [phone]  ---------------------------------------------------------------------------  --------------  SCRIPT ANSWERS  Relationship to Patient?  Self

## 2021-08-05 LAB
ALBUMIN SERPL-MCNC: 3.7 G/DL (ref 3.5–5.2)
ALP BLD-CCNC: 88 U/L (ref 40–129)
ALT SERPL-CCNC: 18 U/L (ref 0–40)
ANION GAP SERPL CALCULATED.3IONS-SCNC: 9 MMOL/L (ref 7–16)
AST SERPL-CCNC: 20 U/L (ref 0–39)
BASOPHILS ABSOLUTE: 0.05 E9/L (ref 0–0.2)
BASOPHILS RELATIVE PERCENT: 0.9 % (ref 0–2)
BILIRUB SERPL-MCNC: 0.4 MG/DL (ref 0–1.2)
BUN BLDV-MCNC: 44 MG/DL (ref 6–20)
CALCIUM SERPL-MCNC: 9.1 MG/DL (ref 8.6–10.2)
CHLORIDE BLD-SCNC: 106 MMOL/L (ref 98–107)
CHOLESTEROL, TOTAL: 116 MG/DL (ref 0–199)
CO2: 26 MMOL/L (ref 22–29)
CREAT SERPL-MCNC: 2.4 MG/DL (ref 0.7–1.2)
EOSINOPHILS ABSOLUTE: 0.19 E9/L (ref 0.05–0.5)
EOSINOPHILS RELATIVE PERCENT: 3.3 % (ref 0–6)
GFR AFRICAN AMERICAN: 34
GFR NON-AFRICAN AMERICAN: 28 ML/MIN/1.73
GLUCOSE BLD-MCNC: 133 MG/DL (ref 74–99)
HBA1C MFR BLD: 6.8 % (ref 4–5.6)
HCT VFR BLD CALC: 29.4 % (ref 37–54)
HDLC SERPL-MCNC: 27 MG/DL
HEMOGLOBIN: 9.9 G/DL (ref 12.5–16.5)
IMMATURE GRANULOCYTES #: 0.03 E9/L
IMMATURE GRANULOCYTES %: 0.5 % (ref 0–5)
LDL CHOLESTEROL CALCULATED: 45 MG/DL (ref 0–99)
LYMPHOCYTES ABSOLUTE: 1.34 E9/L (ref 1.5–4)
LYMPHOCYTES RELATIVE PERCENT: 23.3 % (ref 20–42)
MCH RBC QN AUTO: 28.9 PG (ref 26–35)
MCHC RBC AUTO-ENTMCNC: 33.7 % (ref 32–34.5)
MCV RBC AUTO: 86 FL (ref 80–99.9)
MONOCYTES ABSOLUTE: 0.49 E9/L (ref 0.1–0.95)
MONOCYTES RELATIVE PERCENT: 8.5 % (ref 2–12)
NEUTROPHILS ABSOLUTE: 3.66 E9/L (ref 1.8–7.3)
NEUTROPHILS RELATIVE PERCENT: 63.5 % (ref 43–80)
PDW BLD-RTO: 13.1 FL (ref 11.5–15)
PLATELET # BLD: 136 E9/L (ref 130–450)
PMV BLD AUTO: 10 FL (ref 7–12)
POTASSIUM SERPL-SCNC: 4.1 MMOL/L (ref 3.5–5)
RBC # BLD: 3.42 E12/L (ref 3.8–5.8)
SODIUM BLD-SCNC: 141 MMOL/L (ref 132–146)
T4 TOTAL: 6.2 MCG/DL (ref 4.5–11.7)
TOTAL PROTEIN: 6.3 G/DL (ref 6.4–8.3)
TRIGL SERPL-MCNC: 218 MG/DL (ref 0–149)
TSH SERPL DL<=0.05 MIU/L-ACNC: 0.51 UIU/ML (ref 0.27–4.2)
VLDLC SERPL CALC-MCNC: 44 MG/DL
WBC # BLD: 5.8 E9/L (ref 4.5–11.5)

## 2021-08-06 ENCOUNTER — OFFICE VISIT (OUTPATIENT)
Dept: PRIMARY CARE CLINIC | Age: 56
End: 2021-08-06
Payer: COMMERCIAL

## 2021-08-06 VITALS
BODY MASS INDEX: 34.15 KG/M2 | OXYGEN SATURATION: 98 % | TEMPERATURE: 97.3 F | WEIGHT: 200 LBS | SYSTOLIC BLOOD PRESSURE: 150 MMHG | HEIGHT: 64 IN | DIASTOLIC BLOOD PRESSURE: 100 MMHG | HEART RATE: 81 BPM

## 2021-08-06 DIAGNOSIS — Z12.11 COLON CANCER SCREENING: Primary | ICD-10-CM

## 2021-08-06 DIAGNOSIS — N18.4 STAGE 4 CHRONIC KIDNEY DISEASE (HCC): ICD-10-CM

## 2021-08-06 PROCEDURE — 99214 OFFICE O/P EST MOD 30 MIN: CPT | Performed by: FAMILY MEDICINE

## 2021-08-06 PROCEDURE — G8427 DOCREV CUR MEDS BY ELIG CLIN: HCPCS | Performed by: FAMILY MEDICINE

## 2021-08-06 PROCEDURE — 1036F TOBACCO NON-USER: CPT | Performed by: FAMILY MEDICINE

## 2021-08-06 PROCEDURE — 3017F COLORECTAL CA SCREEN DOC REV: CPT | Performed by: FAMILY MEDICINE

## 2021-08-06 PROCEDURE — G8417 CALC BMI ABV UP PARAM F/U: HCPCS | Performed by: FAMILY MEDICINE

## 2021-08-06 RX ORDER — DOXAZOSIN 2 MG/1
2 TABLET ORAL DAILY
Qty: 30 TABLET | Refills: 3 | Status: SHIPPED
Start: 2021-08-06 | End: 2021-09-03 | Stop reason: SINTOL

## 2021-08-06 RX ORDER — OFLOXACIN 3 MG/ML
SOLUTION/ DROPS OPHTHALMIC
COMMUNITY
Start: 2021-08-02 | End: 2021-11-02

## 2021-08-06 RX ORDER — PREDNISOLONE ACETATE 10 MG/ML
SUSPENSION/ DROPS OPHTHALMIC
COMMUNITY
Start: 2021-08-02 | End: 2021-11-02

## 2021-08-06 ASSESSMENT — ENCOUNTER SYMPTOMS
RESPIRATORY NEGATIVE: 1
ALLERGIC/IMMUNOLOGIC NEGATIVE: 1
GASTROINTESTINAL NEGATIVE: 1
EYES NEGATIVE: 1

## 2021-08-06 NOTE — PROGRESS NOTES
21     Laura Dang    : 1965 Sex: male   Age: 48 y.o. Chief Complaint   Patient presents with    Pre-op Exam     having right eye sx with Dr Susanna Curtis on        Prior to Admission medications    Medication Sig Start Date End Date Taking? Authorizing Provider   prednisoLONE acetate (PRED FORTE) 1 % ophthalmic suspension  21  Yes Historical Provider, MD   ofloxacin (OCUFLOX) 0.3 % solution  21  Yes Historical Provider, MD   doxazosin (CARDURA) 2 MG tablet Take 1 tablet by mouth daily 21  Yes Evelyn Franco, DO   furosemide (LASIX) 40 MG tablet Take 1 tablet by mouth 2 times daily 21  Yes Evelyn Franco, DO   JANUVIA 100 MG tablet Take 1 tablet by mouth daily 21  Yes Evelyn Franco, DO   lisinopril (PRINIVIL;ZESTRIL) 40 MG tablet 1 qd 21  Yes Evelyn Franco, DO   metoprolol succinate (TOPROL XL) 50 MG extended release tablet Take 1 tablet by mouth 2 times daily 21  Yes Evelyn Franco, DO   atorvastatin (LIPITOR) 40 MG tablet Take 1 tablet by mouth nightly 21  Yes Evelyn Franco, DO   glipiZIDE (GLUCOTROL XL) 5 MG extended release tablet 2 by mouth bid 21  Yes Evelyn Franco, DO   isosorbide dinitrate (ISORDIL) 20 MG tablet 2 tablets 3 times a day 21  Yes Evelyn Franco, DO   ONE TOUCH LANCETS MISC Test one time daily. Uses mini glucometer   Yes Historical Provider, MD   blood glucose test strips (ASCENSIA AUTODISC VI;ONE TOUCH ULTRA TEST VI) strip Test one time daily. Uses One Touch ultra   Yes Historical Provider, MD          HPI: Seen today in medical follow-up for hypertensive disease hyperlipidemia coronary artery disease chronic kidney disease. Medically he is actually doing very well at this time. Blood sugars have come under much better control. A1c at 6.8. Staying off of alcohol and feeling better with improved numbers.   Hypertension persist but medications will be maintained with the addition of Cardura once daily as noted. Vitreous hemorrhage is present and evaluated today for preop clearance. Medically he is stable at this time and certainly agree with proceeding with treatment as recommended. Review of Systems   Constitutional: Negative. HENT: Negative. Eyes: Negative. Respiratory: Negative. Gastrointestinal: Negative. Endocrine: Negative. Genitourinary: Negative. Musculoskeletal: Negative. Skin: Negative. Allergic/Immunologic: Negative. Neurological: Negative. Hematological: Negative. Psychiatric/Behavioral: Negative. Today systems review stable. No problems chest pain no shortness of breath meds seem to be well-tolerated. Chronic kidney condition as noted and will be following up with Dr. Ed Waggoner. Current Outpatient Medications:     prednisoLONE acetate (PRED FORTE) 1 % ophthalmic suspension, , Disp: , Rfl:     ofloxacin (OCUFLOX) 0.3 % solution, , Disp: , Rfl:     doxazosin (CARDURA) 2 MG tablet, Take 1 tablet by mouth daily, Disp: 30 tablet, Rfl: 3    furosemide (LASIX) 40 MG tablet, Take 1 tablet by mouth 2 times daily, Disp: 60 tablet, Rfl: 5    JANUVIA 100 MG tablet, Take 1 tablet by mouth daily, Disp: 30 tablet, Rfl: 5    lisinopril (PRINIVIL;ZESTRIL) 40 MG tablet, 1 qd, Disp: 90 tablet, Rfl: 1    metoprolol succinate (TOPROL XL) 50 MG extended release tablet, Take 1 tablet by mouth 2 times daily, Disp: 180 tablet, Rfl: 3    atorvastatin (LIPITOR) 40 MG tablet, Take 1 tablet by mouth nightly, Disp: 90 tablet, Rfl: 3    glipiZIDE (GLUCOTROL XL) 5 MG extended release tablet, 2 by mouth bid, Disp: 360 tablet, Rfl: 3    isosorbide dinitrate (ISORDIL) 20 MG tablet, 2 tablets 3 times a day, Disp: 540 tablet, Rfl: 3    ONE TOUCH LANCETS MISC, Test one time daily. Uses mini glucometer, Disp: , Rfl:     blood glucose test strips (ASCENSIA AUTODISC VI;ONE TOUCH ULTRA TEST VI) strip, Test one time daily.   Uses One Touch ultra, Disp: , Rfl:     No Known Allergies    Social History     Tobacco Use    Smoking status: Never Smoker    Smokeless tobacco: Never Used   Substance Use Topics    Alcohol use: Yes     Alcohol/week: 2.0 standard drinks     Types: 2 Cans of beer per week    Drug use: No      Past Surgical History:   Procedure Laterality Date    TONSILLECTOMY       Family History   Problem Relation Age of Onset    High Blood Pressure Mother     High Cholesterol Mother     High Blood Pressure Father     Breast Cancer Sister     Hypertension Brother     Hypertension Sister     No Known Problems Brother      Past Medical History:   Diagnosis Date    Cardiomyopathy [facility])     CHF (congestive heart failure) (HCC)     CKD (chronic kidney disease)     HTN (hypertension)     Hypercholesterolemia     Medically noncompliant     Non-insulin dependent type 2 diabetes mellitus ([facility] [address]     Obesity        Vitals:    08/06/21 1010   BP: (!) 150/100   Pulse: 81   Temp: 97.3 °F (36.3 °C)   SpO2: 98%   Weight: 200 lb (90.7 kg)   Height: 5' 4\" (1.626 m)     BP Readings from Last 3 Encounters:   08/06/21 (!) 150/100   03/11/21 130/80   01/25/21 136/80    160/82    Physical Exam  Vitals and nursing note reviewed. Constitutional:       Appearance: He is well-developed. HENT:      Head: Normocephalic. Right Ear: External ear normal.      Left Ear: External ear normal.      Nose: Nose normal.   Eyes:      Conjunctiva/sclera: Conjunctivae normal.      Pupils: Pupils are equal, round, and reactive to light. Cardiovascular:      Rate and Rhythm: Normal rate. Pulmonary:      Breath sounds: Normal breath sounds. Abdominal:      General: Bowel sounds are normal.      Palpations: Abdomen is soft. Musculoskeletal:         General: Normal range of motion. Cervical back: Normal range of motion and neck supple. Skin:     General: Skin is warm and dry. Neurological:      Mental Status: He is alert and oriented to person, place, and time. Psychiatric:         Behavior: Behavior normal.        Today's vitals physical examination stable. Medications as prescribed. Considered medically optimal for recommended eye surgery. Reassessment here in 4 weeks. Plan Per Assessment:  Victorine Hashimoto was seen today for pre-op exam.    Diagnoses and all orders for this visit:    Colon cancer screening  -     Cologuard (For External Results Only); Future    Stage 4 chronic kidney disease ([facility] [address]  -     External Referral To Nephrology    Other orders  -     doxazosin (CARDURA) 2 MG tablet; Take 1 tablet by mouth daily            Return in about 4 weeks (around 9/3/2021). Ellen Hutson DO    Note was generated with the assistance of voice recognition software. Document was reviewed however may contain grammatical errors.

## 2021-08-09 ENCOUNTER — TELEPHONE (OUTPATIENT)
Dept: PRIMARY CARE CLINIC | Age: 56
End: 2021-08-09

## 2021-08-09 NOTE — TELEPHONE ENCOUNTER
Dr. Linette Duff office called to advise pt was scheduled an appt for 9/9 at 1:00 and asked if we would notify the patient. Pt notified. Verbalized understanding.

## 2021-08-13 ENCOUNTER — ANESTHESIA EVENT (OUTPATIENT)
Dept: OPERATING ROOM | Age: 56
End: 2021-08-13
Payer: COMMERCIAL

## 2021-08-13 ASSESSMENT — LIFESTYLE VARIABLES: SMOKING_STATUS: 0

## 2021-08-13 NOTE — ANESTHESIA PRE PROCEDURE
Department of Anesthesiology  Preprocedure Note       Name:  Avril Sanchez   Age:  48 y.o.  :  1965                                          MRN:  [id number]         Date:  2021      Surgeon: Christian Kwong):  Tracie Rodriguez MD    Procedure: Procedure(s):  PARS PLANA VITRECTOMY PAN RETINAL PHOTOAGULATION LASER POSS GAS FLUID EXCHANGE VS AIR FLUID EXCHANGE AVASTIN RIGHT EYE    Medications prior to admission:   Prior to Admission medications    Medication Sig Start Date End Date Taking? Authorizing Provider   prednisoLONE acetate (PRED FORTE) 1 % ophthalmic suspension  21   Historical Provider, MD   ofloxacin (OCUFLOX) 0.3 % solution  21   Historical Provider, MD   doxazosin (CARDURA) 2 MG tablet Take 1 tablet by mouth daily 21   Bryan Valentine DO   furosemide (LASIX) 40 MG tablet Take 1 tablet by mouth 2 times daily 21   Bryan Valentine, DO   JANUVIA 100 MG tablet Take 1 tablet by mouth daily 21   Bryan Valentine DO   lisinopril (PRINIVIL;ZESTRIL) 40 MG tablet 1 qd  Patient taking differently: daily 1 qd 21   Frannie Franco, DO   metoprolol succinate (TOPROL XL) 50 MG extended release tablet Take 1 tablet by mouth 2 times daily 21   Bryan Valentine, DO   atorvastatin (LIPITOR) 40 MG tablet Take 1 tablet by mouth nightly 21   Bryan Valentine, DO   glipiZIDE (GLUCOTROL XL) 5 MG extended release tablet 2 by mouth bid 21   Bryan Valentine, DO   isosorbide dinitrate (ISORDIL) 20 MG tablet 2 tablets 3 times a day 21   Bryan Valentine, DO   ONE TOUCH LANCETS MISC Test one time daily. Uses mini glucometer    Historical Provider, MD   blood glucose test strips (ASCENSIA AUTODISC VI;ONE TOUCH ULTRA TEST VI) strip Test one time daily. Uses One Touch ultra    Historical Provider, MD       Current medications:    No current facility-administered medications for this encounter.      Current Outpatient Medications   Medication Sig Dispense Refill    prednisoLONE acetate (PRED FORTE) 1 % ophthalmic suspension       ofloxacin (OCUFLOX) 0.3 % solution       doxazosin (CARDURA) 2 MG tablet Take 1 tablet by mouth daily 30 tablet 3    furosemide (LASIX) 40 MG tablet Take 1 tablet by mouth 2 times daily 60 tablet 5    JANUVIA 100 MG tablet Take 1 tablet by mouth daily 30 tablet 5    lisinopril (PRINIVIL;ZESTRIL) 40 MG tablet 1 qd (Patient taking differently: daily 1 qd) 90 tablet 1    metoprolol succinate (TOPROL XL) 50 MG extended release tablet Take 1 tablet by mouth 2 times daily 180 tablet 3    atorvastatin (LIPITOR) 40 MG tablet Take 1 tablet by mouth nightly 90 tablet 3    glipiZIDE (GLUCOTROL XL) 5 MG extended release tablet 2 by mouth bid 360 tablet 3    isosorbide dinitrate (ISORDIL) 20 MG tablet 2 tablets 3 times a day 540 tablet 3    ONE TOUCH LANCETS MISC Test one time daily. Uses mini glucometer      blood glucose test strips (ASCENSIA AUTODISC VI;ONE TOUCH ULTRA TEST VI) strip Test one time daily.   Uses One Touch ultra         Allergies:  No Known Allergies    Problem List:    Patient Active Problem List   Diagnosis Code    Acute respiratory failure ([facility]) J96.00    Moderate obesity E66.8    Type 2 diabetes mellitus with stage 3 chronic kidney disease, without long-term current use of insulin ([facility]) E11.22, N18.30    Hypertensive urgency I16.0    Acute diastolic CHF (congestive heart failure) ([facility]) I50.31    Bilateral pleural effusion J90    Pericardial effusion I31.3    Medically noncompliant Z91.19    Nonischemic cardiomyopathy ([facility] [address] I42.8    Essential hypertension I10    Chronic diastolic heart failure ([facility]) I50.32    Pure hypercholesterolemia E78.00    Hyperlipidemia E78.5       Past Medical History:        Diagnosis Date    Cardiomyopathy ([facility] [address]     CHF (congestive heart failure) ([facility])     CKD (chronic kidney disease)     HTN (hypertension)     Hypercholesterolemia     Medically noncompliant     Non-insulin dependent type 2 diabetes mellitus ([facility]     Obesity        Past Surgical History:        Procedure Laterality Date    TONSILLECTOMY         Social History:    Social History     Tobacco Use    Smoking status: Never Smoker    Smokeless tobacco: Never Used   Substance Use Topics    Alcohol use: Yes     Alcohol/week: 2.0 standard drinks     Types: 2 Cans of beer per week                                Counseling given: Not Answered      Vital Signs (Current):   Vitals:    08/11/21 1319   Weight: 200 lb (90.7 kg)   Height: 5' 4\" (1.626 m)                                              BP Readings from Last 3 Encounters:   08/06/21 (!) 150/100   03/11/21 130/80   01/25/21 136/80       NPO Status:   >8.H                                                                             BMI:   Wt Readings from Last 3 Encounters:   08/11/21 200 lb (90.7 kg)   08/06/21 200 lb (90.7 kg)   03/11/21 200 lb (90.7 kg)     Body mass index is 34.33 kg/m². CBC:   Lab Results   Component Value Date    WBC 5.8 08/05/2021    RBC 3.42 08/05/2021    HGB 9.9 08/05/2021    HCT 29.4 08/05/2021    MCV 86.0 08/05/2021    RDW 13.1 08/05/2021     08/05/2021       CMP:   Lab Results   Component Value Date     08/05/2021    K 4.1 08/05/2021    K 3.8 05/24/2018     08/05/2021    CO2 26 08/05/2021    BUN 44 08/05/2021    CREATININE 2.4 08/05/2021    GFRAA 34 08/05/2021    LABGLOM 28 08/05/2021    GLUCOSE 133 08/05/2021    PROT 6.3 08/05/2021    CALCIUM 9.1 08/05/2021    BILITOT 0.4 08/05/2021    ALKPHOS 88 08/05/2021    AST 20 08/05/2021    ALT 18 08/05/2021       POC Tests: No results for input(s): POCGLU, POCNA, POCK, POCCL, POCBUN, POCHEMO, POCHCT in the last 72 hours.     Coags:   Lab Results   Component Value Date    PROTIME 11.9 05/21/2018    INR 1.1 05/21/2018       HCG (If Applicable): No results found for: PREGTESTUR, PREGSERUM, HCG, HCGQUANT     ABGs: No results found for: PHART, PO2ART, XKK6GKI, VMY1CSA, BEART, L1RYVECW Type & Screen (If Applicable):  No results found for: LABABO, LABRH    Drug/Infectious Status (If Applicable):  No results found for: HIV, HEPCAB    COVID-19 Screening (If Applicable): No results found for: COVID19        Anesthesia Evaluation  Patient summary reviewed no history of anesthetic complications:   Airway: Mallampati: II  TM distance: >3 FB   Neck ROM: full  Mouth opening: > = 3 FB Dental: normal exam         Pulmonary: breath sounds clear to auscultation      (-) not a current smoker                           Cardiovascular:  Exercise tolerance: good (>4 METS),   (+) hypertension:, CHF:, hyperlipidemia        Rhythm: regular  Rate: normal           Beta Blocker:  Dose within 24 Hrs      ROS comment: ECHO=EF=45-50%    Cleared by PCP     Neuro/Psych:               GI/Hepatic/Renal:   (+) renal disease: CRI,           Endo/Other:    (+) Diabetes, . Abdominal:         (-) obese       Vascular: Other Findings:           Anesthesia Plan      MAC     ASA 3     (Cleared  By PCP)  Induction: intravenous. Anesthetic plan and risks discussed with patient. Plan discussed with CRNA.                 Melecio Peralta MD   8/13/2021

## 2021-08-16 ENCOUNTER — HOSPITAL ENCOUNTER (OUTPATIENT)
Age: 56
Setting detail: OUTPATIENT SURGERY
Discharge: HOME | End: 2021-08-16
Attending: OPHTHALMOLOGY | Admitting: OPHTHALMOLOGY
Payer: COMMERCIAL

## 2021-08-16 ENCOUNTER — ANESTHESIA (OUTPATIENT)
Dept: OPERATING ROOM | Age: 56
End: 2021-08-16
Payer: COMMERCIAL

## 2021-08-16 VITALS
DIASTOLIC BLOOD PRESSURE: 88 MMHG | TEMPERATURE: 98.6 F | OXYGEN SATURATION: 99 % | SYSTOLIC BLOOD PRESSURE: 179 MMHG | RESPIRATION RATE: 35 BRPM

## 2021-08-16 VITALS
RESPIRATION RATE: 16 BRPM | SYSTOLIC BLOOD PRESSURE: 154 MMHG | WEIGHT: 200 LBS | BODY MASS INDEX: 34.15 KG/M2 | HEIGHT: 64 IN | DIASTOLIC BLOOD PRESSURE: 73 MMHG | OXYGEN SATURATION: 97 % | TEMPERATURE: 97 F | HEART RATE: 69 BPM

## 2021-08-16 LAB
METER GLUCOSE: 112 MG/DL (ref 74–99)
METER GLUCOSE: 136 MG/DL (ref 74–99)

## 2021-08-16 PROCEDURE — 7100000011 HC PHASE II RECOVERY - ADDTL 15 MIN: Performed by: OPHTHALMOLOGY

## 2021-08-16 PROCEDURE — 3700000000 HC ANESTHESIA ATTENDED CARE: Performed by: OPHTHALMOLOGY

## 2021-08-16 PROCEDURE — 82962 GLUCOSE BLOOD TEST: CPT

## 2021-08-16 PROCEDURE — 82962 GLUCOSE BLOOD TEST: CPT | Performed by: OPHTHALMOLOGY

## 2021-08-16 PROCEDURE — 6360000002 HC RX W HCPCS: Performed by: NURSE ANESTHETIST, CERTIFIED REGISTERED

## 2021-08-16 PROCEDURE — 3700000001 HC ADD 15 MINUTES (ANESTHESIA): Performed by: OPHTHALMOLOGY

## 2021-08-16 PROCEDURE — 6370000000 HC RX 637 (ALT 250 FOR IP): Performed by: OPHTHALMOLOGY

## 2021-08-16 PROCEDURE — 7100000010 HC PHASE II RECOVERY - FIRST 15 MIN: Performed by: OPHTHALMOLOGY

## 2021-08-16 PROCEDURE — 2720000010 HC SURG SUPPLY STERILE: Performed by: OPHTHALMOLOGY

## 2021-08-16 PROCEDURE — 2500000003 HC RX 250 WO HCPCS: Performed by: OPHTHALMOLOGY

## 2021-08-16 PROCEDURE — 2500000003 HC RX 250 WO HCPCS: Performed by: NURSE ANESTHETIST, CERTIFIED REGISTERED

## 2021-08-16 PROCEDURE — 2709999900 HC NON-CHARGEABLE SUPPLY: Performed by: OPHTHALMOLOGY

## 2021-08-16 PROCEDURE — 2580000003 HC RX 258: Performed by: ANESTHESIOLOGY

## 2021-08-16 PROCEDURE — 3600000002 HC SURGERY LEVEL 2 BASE: Performed by: OPHTHALMOLOGY

## 2021-08-16 PROCEDURE — 3600000012 HC SURGERY LEVEL 2 ADDTL 15MIN: Performed by: OPHTHALMOLOGY

## 2021-08-16 RX ORDER — LABETALOL HYDROCHLORIDE 5 MG/ML
5 INJECTION, SOLUTION INTRAVENOUS EVERY 10 MIN PRN
Status: DISCONTINUED | OUTPATIENT
Start: 2021-08-16 | End: 2021-08-16 | Stop reason: HOSPADM

## 2021-08-16 RX ORDER — LABETALOL HYDROCHLORIDE 5 MG/ML
INJECTION, SOLUTION INTRAVENOUS PRN
Status: DISCONTINUED | OUTPATIENT
Start: 2021-08-16 | End: 2021-08-16 | Stop reason: SDUPTHER

## 2021-08-16 RX ORDER — ONDANSETRON 2 MG/ML
INJECTION INTRAMUSCULAR; INTRAVENOUS PRN
Status: DISCONTINUED | OUTPATIENT
Start: 2021-08-16 | End: 2021-08-16 | Stop reason: SDUPTHER

## 2021-08-16 RX ORDER — FENTANYL CITRATE 50 UG/ML
50 INJECTION, SOLUTION INTRAMUSCULAR; INTRAVENOUS EVERY 5 MIN PRN
Status: DISCONTINUED | OUTPATIENT
Start: 2021-08-16 | End: 2021-08-16 | Stop reason: HOSPADM

## 2021-08-16 RX ORDER — SODIUM CHLORIDE 0.9 % (FLUSH) 0.9 %
5-40 SYRINGE (ML) INJECTION PRN
Status: DISCONTINUED | OUTPATIENT
Start: 2021-08-16 | End: 2021-08-16 | Stop reason: HOSPADM

## 2021-08-16 RX ORDER — HYDROCODONE BITARTRATE AND ACETAMINOPHEN 5; 325 MG/1; MG/1
1 TABLET ORAL PRN
Status: DISCONTINUED | OUTPATIENT
Start: 2021-08-16 | End: 2021-08-16 | Stop reason: HOSPADM

## 2021-08-16 RX ORDER — PROPARACAINE HYDROCHLORIDE 5 MG/ML
1 SOLUTION/ DROPS OPHTHALMIC SEE ADMIN INSTRUCTIONS
Status: COMPLETED | OUTPATIENT
Start: 2021-08-16 | End: 2021-08-16

## 2021-08-16 RX ORDER — BALANCED SALT SOLUTION 6.4; .75; .48; .3; 3.9; 1.7 MG/ML; MG/ML; MG/ML; MG/ML; MG/ML; MG/ML
SOLUTION OPHTHALMIC PRN
Status: DISCONTINUED | OUTPATIENT
Start: 2021-08-16 | End: 2021-08-16 | Stop reason: ALTCHOICE

## 2021-08-16 RX ORDER — HYDRALAZINE HYDROCHLORIDE 20 MG/ML
5 INJECTION INTRAMUSCULAR; INTRAVENOUS EVERY 10 MIN PRN
Status: DISCONTINUED | OUTPATIENT
Start: 2021-08-16 | End: 2021-08-16 | Stop reason: HOSPADM

## 2021-08-16 RX ORDER — DIPHENHYDRAMINE HYDROCHLORIDE 50 MG/ML
12.5 INJECTION INTRAMUSCULAR; INTRAVENOUS
Status: DISCONTINUED | OUTPATIENT
Start: 2021-08-16 | End: 2021-08-16 | Stop reason: HOSPADM

## 2021-08-16 RX ORDER — SODIUM CHLORIDE 9 MG/ML
25 INJECTION, SOLUTION INTRAVENOUS PRN
Status: DISCONTINUED | OUTPATIENT
Start: 2021-08-16 | End: 2021-08-16 | Stop reason: HOSPADM

## 2021-08-16 RX ORDER — PROMETHAZINE HYDROCHLORIDE 25 MG/ML
25 INJECTION, SOLUTION INTRAMUSCULAR; INTRAVENOUS
Status: DISCONTINUED | OUTPATIENT
Start: 2021-08-16 | End: 2021-08-16 | Stop reason: HOSPADM

## 2021-08-16 RX ORDER — MIDAZOLAM HYDROCHLORIDE 1 MG/ML
INJECTION INTRAMUSCULAR; INTRAVENOUS PRN
Status: DISCONTINUED | OUTPATIENT
Start: 2021-08-16 | End: 2021-08-16 | Stop reason: SDUPTHER

## 2021-08-16 RX ORDER — MORPHINE SULFATE 2 MG/ML
1 INJECTION, SOLUTION INTRAMUSCULAR; INTRAVENOUS EVERY 5 MIN PRN
Status: DISCONTINUED | OUTPATIENT
Start: 2021-08-16 | End: 2021-08-16 | Stop reason: HOSPADM

## 2021-08-16 RX ORDER — TROPICAMIDE 10 MG/ML
1 SOLUTION/ DROPS OPHTHALMIC SEE ADMIN INSTRUCTIONS
Status: DISCONTINUED | OUTPATIENT
Start: 2021-08-16 | End: 2021-08-16 | Stop reason: HOSPADM

## 2021-08-16 RX ORDER — MEPERIDINE HYDROCHLORIDE 25 MG/ML
12.5 INJECTION INTRAMUSCULAR; INTRAVENOUS; SUBCUTANEOUS EVERY 5 MIN PRN
Status: DISCONTINUED | OUTPATIENT
Start: 2021-08-16 | End: 2021-08-16 | Stop reason: HOSPADM

## 2021-08-16 RX ORDER — FENTANYL CITRATE 50 UG/ML
INJECTION, SOLUTION INTRAMUSCULAR; INTRAVENOUS PRN
Status: DISCONTINUED | OUTPATIENT
Start: 2021-08-16 | End: 2021-08-16 | Stop reason: SDUPTHER

## 2021-08-16 RX ORDER — TOBRAMYCIN 3 MG/ML
1 SOLUTION/ DROPS OPHTHALMIC SEE ADMIN INSTRUCTIONS
Status: COMPLETED | OUTPATIENT
Start: 2021-08-16 | End: 2021-08-16

## 2021-08-16 RX ORDER — HYDROCODONE BITARTRATE AND ACETAMINOPHEN 5; 325 MG/1; MG/1
2 TABLET ORAL PRN
Status: DISCONTINUED | OUTPATIENT
Start: 2021-08-16 | End: 2021-08-16 | Stop reason: HOSPADM

## 2021-08-16 RX ORDER — HYDRALAZINE HYDROCHLORIDE 20 MG/ML
INJECTION INTRAMUSCULAR; INTRAVENOUS PRN
Status: DISCONTINUED | OUTPATIENT
Start: 2021-08-16 | End: 2021-08-16 | Stop reason: SDUPTHER

## 2021-08-16 RX ORDER — TETRACAINE HYDROCHLORIDE 5 MG/ML
SOLUTION OPHTHALMIC PRN
Status: DISCONTINUED | OUTPATIENT
Start: 2021-08-16 | End: 2021-08-16 | Stop reason: ALTCHOICE

## 2021-08-16 RX ORDER — SODIUM CHLORIDE 0.9 % (FLUSH) 0.9 %
5-40 SYRINGE (ML) INJECTION EVERY 12 HOURS SCHEDULED
Status: DISCONTINUED | OUTPATIENT
Start: 2021-08-16 | End: 2021-08-16 | Stop reason: HOSPADM

## 2021-08-16 RX ORDER — PHENYLEPHRINE HCL 2.5 %
1 DROPS OPHTHALMIC (EYE) SEE ADMIN INSTRUCTIONS
Status: DISCONTINUED | OUTPATIENT
Start: 2021-08-16 | End: 2021-08-16 | Stop reason: HOSPADM

## 2021-08-16 RX ORDER — SODIUM CHLORIDE, SODIUM LACTATE, POTASSIUM CHLORIDE, CALCIUM CHLORIDE 600; 310; 30; 20 MG/100ML; MG/100ML; MG/100ML; MG/100ML
INJECTION, SOLUTION INTRAVENOUS CONTINUOUS
Status: DISCONTINUED | OUTPATIENT
Start: 2021-08-16 | End: 2021-08-16 | Stop reason: HOSPADM

## 2021-08-16 RX ADMIN — HYDRALAZINE HYDROCHLORIDE 5 MG: 20 INJECTION INTRAMUSCULAR; INTRAVENOUS at 14:00

## 2021-08-16 RX ADMIN — TROPICAMIDE 1 DROP: 10 SOLUTION/ DROPS OPHTHALMIC at 11:35

## 2021-08-16 RX ADMIN — ONDANSETRON 4 MG: 2 INJECTION INTRAMUSCULAR; INTRAVENOUS at 13:31

## 2021-08-16 RX ADMIN — SODIUM CHLORIDE, POTASSIUM CHLORIDE, SODIUM LACTATE AND CALCIUM CHLORIDE: 600; 310; 30; 20 INJECTION, SOLUTION INTRAVENOUS at 11:38

## 2021-08-16 RX ADMIN — MIDAZOLAM 2 MG: 1 INJECTION INTRAMUSCULAR; INTRAVENOUS at 13:29

## 2021-08-16 RX ADMIN — LABETALOL HYDROCHLORIDE 10 MG: 5 INJECTION INTRAVENOUS at 13:37

## 2021-08-16 RX ADMIN — HYDRALAZINE HYDROCHLORIDE 5 MG: 20 INJECTION INTRAMUSCULAR; INTRAVENOUS at 13:54

## 2021-08-16 RX ADMIN — TROPICAMIDE 1 DROP: 10 SOLUTION/ DROPS OPHTHALMIC at 11:40

## 2021-08-16 RX ADMIN — HYDRALAZINE HYDROCHLORIDE 5 MG: 20 INJECTION INTRAMUSCULAR; INTRAVENOUS at 14:07

## 2021-08-16 RX ADMIN — TROPICAMIDE 1 DROP: 10 SOLUTION/ DROPS OPHTHALMIC at 11:30

## 2021-08-16 RX ADMIN — FENTANYL CITRATE 50 MCG: 50 INJECTION, SOLUTION INTRAMUSCULAR; INTRAVENOUS at 13:30

## 2021-08-16 RX ADMIN — PHENYLEPHRINE HYDROCHLORIDE 1 DROP: 25 SOLUTION/ DROPS OPHTHALMIC at 11:35

## 2021-08-16 RX ADMIN — PHENYLEPHRINE HYDROCHLORIDE 1 DROP: 25 SOLUTION/ DROPS OPHTHALMIC at 11:40

## 2021-08-16 RX ADMIN — LABETALOL HYDROCHLORIDE 10 MG: 5 INJECTION INTRAVENOUS at 13:48

## 2021-08-16 RX ADMIN — PROPARACAINE HYDROCHLORIDE 1 DROP: 5 SOLUTION/ DROPS OPHTHALMIC at 11:30

## 2021-08-16 RX ADMIN — HYDRALAZINE HYDROCHLORIDE 5 MG: 20 INJECTION INTRAMUSCULAR; INTRAVENOUS at 14:52

## 2021-08-16 RX ADMIN — TOBRAMYCIN 1 DROP: 3 SOLUTION/ DROPS OPHTHALMIC at 11:30

## 2021-08-16 RX ADMIN — MIDAZOLAM 2 MG: 1 INJECTION INTRAMUSCULAR; INTRAVENOUS at 14:25

## 2021-08-16 RX ADMIN — FENTANYL CITRATE 50 MCG: 50 INJECTION, SOLUTION INTRAMUSCULAR; INTRAVENOUS at 13:32

## 2021-08-16 RX ADMIN — PHENYLEPHRINE HYDROCHLORIDE 1 DROP: 25 SOLUTION/ DROPS OPHTHALMIC at 11:30

## 2021-08-16 ASSESSMENT — PULMONARY FUNCTION TESTS: PIF_VALUE: 0

## 2021-08-16 ASSESSMENT — PAIN - FUNCTIONAL ASSESSMENT: PAIN_FUNCTIONAL_ASSESSMENT: 0-10

## 2021-08-16 ASSESSMENT — PAIN SCALES - GENERAL
PAINLEVEL_OUTOF10: 0

## 2021-08-16 NOTE — H&P
I have examined the patient and reviewed the history and physical from 8/2/21 and find no relevant changes. I have reviewed with the patient and/or family the risks, benefits, and alternatives to the procedure and they have agreed to proceed.     Natalia Watson MD

## 2021-08-16 NOTE — OP NOTE
Operative Note      Patient: Vishnu Larose  YOB: 1965  MRN: [id number]    Date of Procedure: 8/16/2021  Surgeon:  Mahogany Mcknight MD    Assistant:  Kimber Hester PA-C    Preoperative Diagnosis:   1. Non-clearing vitreous hemorrhage, right eye  2. 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 Non-clearing vitreous hemorrhage, right eye  2. 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 Tractional retinal detachment, right eye    Procedure Performed:   1. 25-Gauge pars plana vitrectomy, right eye  2. Peeling of preretinal tissue (hyaloid), right  eye  3. Endolaser photocoagulation (PRP), right  eye  4. Fluid-air exchange, right eye  5. Gas tamponade C3F8 15%, right eye    Complications:   None    Anesthesia Used:  Monitored anesthesia care with retrobulbar block    Blood Loss: <5ml    Operative Course:  After history, physical, and consent was obtained, the patient was taken to the operating room and given a bolus of IV sedating medication. The patient then received a retro bulbar block of 1:1 Lidocaine and Marcaine to the right eye. The operative eye was then prepped and draped in the usual sterile fashion. Lid speculum was placed between the eyelids. The transscleral cannulas were inserted in the usual location, two temporally and one superonasally at 4 mm  posterior to the limbus. An infusion cannula was inserted inferotemporally. The light pipe and vitrector handpiece were inserted in the eye, and a core vitrectomy was performed clearing the vitreous hemorrhage. A posterior hyaloid separation was induced to release the fibrovascular traction from the optic nerve using aspiration. The hyaloid was carefully peeled and laminated from multiple areas of neovascularization elsewhere fronds. Retinal holes were noted superonasally and inferonasally. Cautery was applied where needed.  Panretinal photocoagulation was carried out in the periphery. The periphery was inspected and no other retinal tears were found. The soft tip was used to perform a fluid-air exchange. The air was exchanged for 15% C3F8. The transscleral cannulas were removed. The superonasal wound was sutured closed with 6-0 plain gut. The eye was dressed with Maxitrol ointment, pressure patch and Stevens shield. The patient was taken from the operating room to recovery awake and in good condition. The patient is to be in a chin to chest position during the day for 5 days and sleep on either side at night. He is scheduled to follow-up in the office for a postoperative check.             Electronically signed by Ruba Day MD on 8/15/2021 at 8:17 PM

## 2021-08-16 NOTE — ANESTHESIA POSTPROCEDURE EVALUATION
Department of Anesthesiology  Postprocedure Note    Patient: Kurt Bingham  MRN: [id number]  YOB: 1965  Date of evaluation: 8/16/2021  Time:  3:14 PM     Procedure Summary     Date: 08/16/21 Room / Location: [address] [place] [address] 02 / [address] [facility]    Anesthesia Start: 2713 Anesthesia Stop: 1504    Procedure: PARS PLANA VITRECTOMY PAN RETINAL PHOTOAGULATION LASER GAS FLUID EXCHANGE RIGHT EYE (Right ) Diagnosis: (VITREOUS HEMORRHAGE)    Surgeons: Alyssa Weber MD Responsible Provider: Yehuda Carrasquillo MD    Anesthesia Type: MAC ASA Status: 3          Anesthesia Type: MAC    Russell Phase I: Russell Score: 10    Russell Phase II: Russell Score: 9    Last vitals: Reviewed and per EMR flowsheets.        Anesthesia Post Evaluation    Patient location during evaluation: PACU  Patient participation: complete - patient participated  Level of consciousness: awake  Airway patency: patent  Nausea & Vomiting: no nausea and no vomiting  Complications: no  Cardiovascular status: blood pressure returned to baseline  Respiratory status: room air and spontaneous ventilation  Hydration status: stable

## 2021-09-03 ENCOUNTER — OFFICE VISIT (OUTPATIENT)
Dept: PRIMARY CARE CLINIC | Age: 56
End: 2021-09-03
Payer: COMMERCIAL

## 2021-09-03 VITALS
OXYGEN SATURATION: 98 % | WEIGHT: 199 LBS | DIASTOLIC BLOOD PRESSURE: 84 MMHG | SYSTOLIC BLOOD PRESSURE: 146 MMHG | HEART RATE: 66 BPM | BODY MASS INDEX: 34.16 KG/M2

## 2021-09-03 DIAGNOSIS — E78.5 HYPERLIPIDEMIA, UNSPECIFIED HYPERLIPIDEMIA TYPE: ICD-10-CM

## 2021-09-03 DIAGNOSIS — N18.32 TYPE 2 DIABETES MELLITUS WITH STAGE 3B CHRONIC KIDNEY DISEASE, WITHOUT LONG-TERM CURRENT USE OF INSULIN (HCC): Primary | ICD-10-CM

## 2021-09-03 DIAGNOSIS — I10 ESSENTIAL HYPERTENSION: ICD-10-CM

## 2021-09-03 DIAGNOSIS — N18.4 STAGE 4 CHRONIC KIDNEY DISEASE (HCC): ICD-10-CM

## 2021-09-03 DIAGNOSIS — E11.22 TYPE 2 DIABETES MELLITUS WITH STAGE 3B CHRONIC KIDNEY DISEASE, WITHOUT LONG-TERM CURRENT USE OF INSULIN (HCC): Primary | ICD-10-CM

## 2021-09-03 PROCEDURE — G8428 CUR MEDS NOT DOCUMENT: HCPCS | Performed by: FAMILY MEDICINE

## 2021-09-03 PROCEDURE — 1036F TOBACCO NON-USER: CPT | Performed by: FAMILY MEDICINE

## 2021-09-03 PROCEDURE — 3044F HG A1C LEVEL LT 7.0%: CPT | Performed by: FAMILY MEDICINE

## 2021-09-03 PROCEDURE — G8417 CALC BMI ABV UP PARAM F/U: HCPCS | Performed by: FAMILY MEDICINE

## 2021-09-03 PROCEDURE — 99214 OFFICE O/P EST MOD 30 MIN: CPT | Performed by: FAMILY MEDICINE

## 2021-09-03 PROCEDURE — 2022F DILAT RTA XM EVC RTNOPTHY: CPT | Performed by: FAMILY MEDICINE

## 2021-09-03 PROCEDURE — 3017F COLORECTAL CA SCREEN DOC REV: CPT | Performed by: FAMILY MEDICINE

## 2021-09-03 RX ORDER — GLIPIZIDE 5 MG/1
TABLET, FILM COATED, EXTENDED RELEASE ORAL
Qty: 360 TABLET | Refills: 3 | Status: SHIPPED
Start: 2021-09-03 | End: 2022-10-31 | Stop reason: SDUPTHER

## 2021-09-03 RX ORDER — DORZOLAMIDE HCL 20 MG/ML
SOLUTION/ DROPS OPHTHALMIC
COMMUNITY
Start: 2021-08-30 | End: 2021-11-02

## 2021-09-03 RX ORDER — ISOSORBIDE DINITRATE 20 MG/1
TABLET ORAL
Qty: 540 TABLET | Refills: 3 | Status: SHIPPED
Start: 2021-09-03 | End: 2022-10-31 | Stop reason: SDUPTHER

## 2021-09-03 RX ORDER — BRIMONIDINE TARTRATE 0.1 %
DROPS OPHTHALMIC (EYE)
COMMUNITY
Start: 2021-08-24 | End: 2021-11-02

## 2021-09-03 RX ORDER — BENAZEPRIL HYDROCHLORIDE AND HYDROCHLOROTHIAZIDE 20; 12.5 MG/1; MG/1
TABLET ORAL
Qty: 60 TABLET | Refills: 3 | Status: SHIPPED
Start: 2021-09-03 | End: 2022-10-31 | Stop reason: SDUPTHER

## 2021-09-03 RX ORDER — LATANOPROST 50 UG/ML
SOLUTION/ DROPS OPHTHALMIC
COMMUNITY
Start: 2021-08-30 | End: 2021-11-02

## 2021-09-03 NOTE — PROGRESS NOTES
9/3/21     Isiah Brown    : 1965 Sex: male   Age: 48 y.o. Chief Complaint   Patient presents with    Hypertension     recently stopped cardura    Diabetes    Eye Problem       Prior to Admission medications    Medication Sig Start Date End Date Taking? Authorizing Provider   glipiZIDE (GLUCOTROL XL) 5 MG extended release tablet 2 by mouth bid 9/3/21  Yes Lennox Franco, DO   isosorbide dinitrate (ISORDIL) 20 MG tablet 2 tablets 3 times a day 9/3/21  Yes Kit Franco, DO   benazepril-hydrochlorthiazide (LOTENSIN HCT) 20-12.5 MG per tablet 1am 1pm 9/3/21  Yes Lennox Franco DO   prednisoLONE acetate (PRED FORTE) 1 % ophthalmic suspension  21  Yes Historical Provider, MD   furosemide (LASIX) 40 MG tablet Take 1 tablet by mouth 2 times daily 21  Yes Lennox Franco DO   JANUVIA 100 MG tablet Take 1 tablet by mouth daily 21  Yes Lennox Franco, DO   metoprolol succinate (TOPROL XL) 50 MG extended release tablet Take 1 tablet by mouth 2 times daily 21  Yes Lennox Franco DO   atorvastatin (LIPITOR) 40 MG tablet Take 1 tablet by mouth nightly 21  Yes Lennox Franco, DO   ONE TOUCH LANCETS MISC Test one time daily. Uses mini glucometer   Yes Historical Provider, MD   blood glucose test strips (ASCENSIA AUTODISC VI;ONE TOUCH ULTRA TEST VI) strip Test one time daily. Uses One Touch ultra   Yes Historical Provider, MD   ALPHAGAN P 0.1 % SOLN  21   Historical Provider, MD   dorzolamide (TRUSOPT) 2 % ophthalmic solution  21   Historical Provider, MD   latanoprost (XALATAN) 0.005 % ophthalmic solution  21   Historical Provider, MD   ofloxacin (OCUFLOX) 0.3 % solution  21   Historical Provider, MD          HPI: Michele Obrien is in today following up on diabetes hyperlipidemia hypertension chronic kidney disease anemia. Pressure remains very labile. He was on doxazosin but did not tolerate so discontinued on his own.   Today's pressure per No Known Allergies    Social History     Tobacco Use    Smoking status: Never Smoker    Smokeless tobacco: Never Used   Substance Use Topics    Alcohol use: Yes     Alcohol/week: 2.0 standard drinks     Types: 2 Cans of beer per week    Drug use: No      Past Surgical History:   Procedure Laterality Date    TONSILLECTOMY      VITRECTOMY Right 8/16/2021    PARS PLANA VITRECTOMY PAN RETINAL PHOTOAGULATION LASER GAS FLUID EXCHANGE RIGHT EYE performed by Betina Castro MD at [address] [place] [address]     Family History   Problem Relation Age of Onset    High Blood Pressure Mother     High Cholesterol Mother     High Blood Pressure Father     Breast Cancer Sister     Hypertension Brother     Hypertension Sister     No Known Problems Brother      Past Medical History:   Diagnosis Date    Cardiomyopathy [facility])     CHF (congestive heart failure) (HCC)     CKD (chronic kidney disease)     HTN (hypertension)     Hypercholesterolemia     Medically noncompliant     Non-insulin dependent type 2 diabetes mellitus ([facility]     Obesity        Vitals:    09/03/21 1036   BP: (!) 146/84   Pulse: 66   SpO2: 98%   Weight: 199 lb (90.3 kg)     BP Readings from Last 3 Encounters:   09/03/21 (!) 146/84   08/16/21 (!) 179/88   08/16/21 (!) 154/73        Physical Exam  Vitals and nursing note reviewed. Constitutional:       Appearance: He is well-developed. HENT:      Head: Normocephalic. Right Ear: External ear normal.      Left Ear: External ear normal.      Nose: Nose normal.   Eyes:      Conjunctiva/sclera: Conjunctivae normal.      Pupils: Pupils are equal, round, and reactive to light. Cardiovascular:      Rate and Rhythm: Normal rate. Pulmonary:      Breath sounds: Normal breath sounds. Abdominal:      General: Bowel sounds are normal.      Palpations: Abdomen is soft. Musculoskeletal:         General: Normal range of motion. Cervical back: Normal range of motion and neck supple.    Skin: General: Skin is warm and dry. Neurological:      Mental Status: He is alert and oriented to person, place, and time. Psychiatric:         Behavior: Behavior normal.     Today's vitals and physical exam as noted. Blood pressure is the primary concern. Adjustments with medications and then follow-up with me in the next month and blood work at that time. We will make further adjustments if needed.   Lab Results   Component Value Date    TSH 0.505 08/05/2021    TSH 0.874 01/25/2021    M9TZTEN 6.2 08/05/2021    U9XYXJU 5.6 01/25/2021     Lab Results   Component Value Date    CHOL 116 08/05/2021    CHOL 191 01/25/2021     Lab Results   Component Value Date    TRIG 218 (H) 08/05/2021    TRIG 667 (H) 01/25/2021     Lab Results   Component Value Date    HDL 27 08/05/2021    HDL 22 01/25/2021     No results found for: [facility]  Lab Results   Component Value Date    LABVLDL 44 08/05/2021    LABVLDL - (AA) 01/25/2021     No results found for: [facility]  Lab Results   Component Value Date    WBC 5.8 08/05/2021    HGB 9.9 (L) 08/05/2021    HCT 29.4 (L) 08/05/2021    MCV 86.0 08/05/2021     08/05/2021    LYMPHOPCT 23.3 08/05/2021    RBC 3.42 (L) 08/05/2021    MCH 28.9 08/05/2021    MCHC 33.7 08/05/2021    RDW 13.1 08/05/2021     Lab Results   Component Value Date     08/05/2021    K 4.1 08/05/2021     08/05/2021    CO2 26 08/05/2021    BUN 44 (H) 08/05/2021    CREATININE 2.4 (H) 08/05/2021    GLUCOSE 133 (H) 08/05/2021    CALCIUM 9.1 08/05/2021    PROT 6.3 (L) 08/05/2021    LABALBU 3.7 08/05/2021    BILITOT 0.4 08/05/2021    ALKPHOS 88 08/05/2021    AST 20 08/05/2021    ALT 18 08/05/2021    LABGLOM 28 08/05/2021    GFRAA 34 08/05/2021      No results found for: PSA, PSADIA   Lab Results   Component Value Date    LABA1C 6.8 (H) 08/05/2021    LABA1C 7.6 (H) 01/25/2021    LABA1C 8.7 (H) 02/04/2020     No results found for: EAG             Plan Per Assessment:  Dimple Mcgowan was seen today for hypertension, diabetes and eye problem. Diagnoses and all orders for this visit:    Type 2 diabetes mellitus with stage 3b chronic kidney disease, without long-term current use of insulin (HCC)    Hyperlipidemia, unspecified hyperlipidemia type    Essential hypertension  -     CBC Auto Differential; Future  -     Comprehensive Metabolic Panel; Future    Stage 4 chronic kidney disease (HCC)  -     CBC Auto Differential; Future  -     Comprehensive Metabolic Panel; Future    Other orders  -     glipiZIDE (GLUCOTROL XL) 5 MG extended release tablet; 2 by mouth bid  -     isosorbide dinitrate (ISORDIL) 20 MG tablet; 2 tablets 3 times a day  -     benazepril-hydrochlorthiazide (LOTENSIN HCT) 20-12.5 MG per tablet; 1am 1pm            No follow-ups on file. Tessie Valdivia DO    Note was generated with the assistance of voice recognition software. Document was reviewed however may contain grammatical errors.

## 2021-10-08 ENCOUNTER — OFFICE VISIT (OUTPATIENT)
Dept: PRIMARY CARE CLINIC | Age: 56
End: 2021-10-08
Payer: COMMERCIAL

## 2021-10-08 VITALS
DIASTOLIC BLOOD PRESSURE: 80 MMHG | OXYGEN SATURATION: 98 % | TEMPERATURE: 98.7 F | SYSTOLIC BLOOD PRESSURE: 138 MMHG | HEART RATE: 62 BPM

## 2021-10-08 DIAGNOSIS — E11.22 TYPE 2 DIABETES MELLITUS WITH STAGE 3B CHRONIC KIDNEY DISEASE, WITHOUT LONG-TERM CURRENT USE OF INSULIN (HCC): ICD-10-CM

## 2021-10-08 DIAGNOSIS — N18.4 STAGE 4 CHRONIC KIDNEY DISEASE (HCC): ICD-10-CM

## 2021-10-08 DIAGNOSIS — N18.32 TYPE 2 DIABETES MELLITUS WITH STAGE 3B CHRONIC KIDNEY DISEASE, WITHOUT LONG-TERM CURRENT USE OF INSULIN (HCC): ICD-10-CM

## 2021-10-08 DIAGNOSIS — I10 ESSENTIAL HYPERTENSION: Primary | ICD-10-CM

## 2021-10-08 DIAGNOSIS — E78.5 HYPERLIPIDEMIA, UNSPECIFIED HYPERLIPIDEMIA TYPE: ICD-10-CM

## 2021-10-08 DIAGNOSIS — E79.0 HYPERURICEMIA: ICD-10-CM

## 2021-10-08 PROCEDURE — G8417 CALC BMI ABV UP PARAM F/U: HCPCS | Performed by: FAMILY MEDICINE

## 2021-10-08 PROCEDURE — 3044F HG A1C LEVEL LT 7.0%: CPT | Performed by: FAMILY MEDICINE

## 2021-10-08 PROCEDURE — 99214 OFFICE O/P EST MOD 30 MIN: CPT | Performed by: FAMILY MEDICINE

## 2021-10-08 PROCEDURE — G8427 DOCREV CUR MEDS BY ELIG CLIN: HCPCS | Performed by: FAMILY MEDICINE

## 2021-10-08 PROCEDURE — 1036F TOBACCO NON-USER: CPT | Performed by: FAMILY MEDICINE

## 2021-10-08 PROCEDURE — 2022F DILAT RTA XM EVC RTNOPTHY: CPT | Performed by: FAMILY MEDICINE

## 2021-10-08 PROCEDURE — G8484 FLU IMMUNIZE NO ADMIN: HCPCS | Performed by: FAMILY MEDICINE

## 2021-10-08 PROCEDURE — 3017F COLORECTAL CA SCREEN DOC REV: CPT | Performed by: FAMILY MEDICINE

## 2021-10-08 RX ORDER — NIFEDIPINE 30 MG/1
TABLET, FILM COATED, EXTENDED RELEASE ORAL
COMMUNITY
Start: 2021-09-21 | End: 2022-02-09 | Stop reason: SDUPTHER

## 2021-10-08 NOTE — PROGRESS NOTES
10/8/21     Ginger Leal    : 1965 Sex: male   Age: 48 y.o. Chief Complaint   Patient presents with    Discuss Labs    Diabetes    Hypertension       Prior to Admission medications    Medication Sig Start Date End Date Taking? Authorizing Provider   NIFEdipine (ADALAT CC) 30 MG extended release tablet  21  Yes Historical Provider, MD   ALPHAGAN P 0.1 % SOLN  21  Yes Historical Provider, MD   dorzolamide (TRUSOPT) 2 % ophthalmic solution  21  Yes Historical Provider, MD   latanoprost (XALATAN) 0.005 % ophthalmic solution  21  Yes Historical Provider, MD   glipiZIDE (GLUCOTROL XL) 5 MG extended release tablet 2 by mouth bid 9/3/21  Yes Sailaja Franco, DO   isosorbide dinitrate (ISORDIL) 20 MG tablet 2 tablets 3 times a day 9/3/21  Yes Kit Franco, DO   benazepril-hydrochlorthiazide (LOTENSIN HCT) 20-12.5 MG per tablet 1am 1pm 9/3/21  Yes Sailaja Franco, DO   prednisoLONE acetate (PRED FORTE) 1 % ophthalmic suspension  21  Yes Historical Provider, MD   ofloxacin (OCUFLOX) 0.3 % solution  21  Yes Historical Provider, MD   furosemide (LASIX) 40 MG tablet Take 1 tablet by mouth 2 times daily 21  Yes Sailaja Franco DO   JANUVIA 100 MG tablet Take 1 tablet by mouth daily 21  Yes Sailaja Franco DO   metoprolol succinate (TOPROL XL) 50 MG extended release tablet Take 1 tablet by mouth 2 times daily 21  Yes Sailaja Franco DO   atorvastatin (LIPITOR) 40 MG tablet Take 1 tablet by mouth nightly 21  Yes Sailaja Franco, DO   ONE TOUCH LANCETS MISC Test one time daily. Uses mini glucometer   Yes Historical Provider, MD   blood glucose test strips (ASCENSIA AUTODISC VI;ONE TOUCH ULTRA TEST VI) strip Test one time daily. Uses One Touch ultra   Yes Historical Provider, MD          HPI: Patient evaluated today with hypertension diabetes chronic kidney disease hyperlipidemia hyperuricemia.   Recent evaluation with nephrology and placed on benazepril hydrochlorothiazide or Lotensin hydrochlorothiazide at 2012.5 and tolerating well. Labs will be completed on Monday follow-up with nephrology next week. Uric acid level was quite elevated on 15 September and we will have that repeated. Persistent elevation would consider some allopurinol. Medically otherwise stable. Review of Systems   Constitutional: Negative. HENT: Negative. Eyes: Negative. Respiratory: Negative. Gastrointestinal: Negative. Endocrine: Negative. Genitourinary: Negative. Musculoskeletal: Negative. Skin: Negative. Allergic/Immunologic: Negative. Neurological: Negative. Hematological: Negative. Psychiatric/Behavioral: Negative. Current Outpatient Medications:     NIFEdipine (ADALAT CC) 30 MG extended release tablet, , Disp: , Rfl:     ALPHAGAN P 0.1 % SOLN, , Disp: , Rfl:     dorzolamide (TRUSOPT) 2 % ophthalmic solution, , Disp: , Rfl:     latanoprost (XALATAN) 0.005 % ophthalmic solution, , Disp: , Rfl:     glipiZIDE (GLUCOTROL XL) 5 MG extended release tablet, 2 by mouth bid, Disp: 360 tablet, Rfl: 3    isosorbide dinitrate (ISORDIL) 20 MG tablet, 2 tablets 3 times a day, Disp: 540 tablet, Rfl: 3    benazepril-hydrochlorthiazide (LOTENSIN HCT) 20-12.5 MG per tablet, 1am 1pm, Disp: 60 tablet, Rfl: 3    prednisoLONE acetate (PRED FORTE) 1 % ophthalmic suspension, , Disp: , Rfl:     ofloxacin (OCUFLOX) 0.3 % solution, , Disp: , Rfl:     furosemide (LASIX) 40 MG tablet, Take 1 tablet by mouth 2 times daily, Disp: 60 tablet, Rfl: 5    JANUVIA 100 MG tablet, Take 1 tablet by mouth daily, Disp: 30 tablet, Rfl: 5    metoprolol succinate (TOPROL XL) 50 MG extended release tablet, Take 1 tablet by mouth 2 times daily, Disp: 180 tablet, Rfl: 3    atorvastatin (LIPITOR) 40 MG tablet, Take 1 tablet by mouth nightly, Disp: 90 tablet, Rfl: 3    ONE TOUCH LANCETS MISC, Test one time daily.   Uses mini glucometer, Disp: , Rfl:   blood glucose test strips (ASCENSIA AUTODISC VI;ONE TOUCH ULTRA TEST VI) strip, Test one time daily. Uses One Touch ultra, Disp: , Rfl:     No Known Allergies    Social History     Tobacco Use    Smoking status: Never Smoker    Smokeless tobacco: Never Used   Substance Use Topics    Alcohol use: Yes     Alcohol/week: 2.0 standard drinks     Types: 2 Cans of beer per week    Drug use: No      Past Surgical History:   Procedure Laterality Date    TONSILLECTOMY      VITRECTOMY Right 8/16/2021    PARS PLANA VITRECTOMY PAN RETINAL PHOTOAGULATION LASER GAS FLUID EXCHANGE RIGHT EYE performed by Zeyad Herrera MD at [address] [place] [address]     Family History   Problem Relation Age of Onset    High Blood Pressure Mother     High Cholesterol Mother     High Blood Pressure Father     Breast Cancer Sister     Hypertension Brother     Hypertension Sister     No Known Problems Brother      Past Medical History:   Diagnosis Date    Cardiomyopathy [facility])     CHF (congestive heart failure) (HCC)     CKD (chronic kidney disease)     HTN (hypertension)     Hypercholesterolemia     Medically noncompliant     Non-insulin dependent type 2 diabetes mellitus ([address]     Obesity        Vitals:    10/08/21 1118   BP: 138/80   Pulse: 62   Temp: 98.7 °F (37.1 °C)   SpO2: 98%     BP Readings from Last 3 Encounters:   10/08/21 138/80   09/03/21 (!) 146/84   08/16/21 (!) 179/88        Physical Exam  Vitals and nursing note reviewed. Constitutional:       Appearance: He is well-developed. HENT:      Head: Normocephalic. Right Ear: External ear normal.      Left Ear: External ear normal.      Nose: Nose normal.   Eyes:      Conjunctiva/sclera: Conjunctivae normal.      Pupils: Pupils are equal, round, and reactive to light. Cardiovascular:      Rate and Rhythm: Normal rate. Pulmonary:      Breath sounds: Normal breath sounds. Abdominal:      General: Bowel sounds are normal.      Palpations: Abdomen is soft. Musculoskeletal:         General: Normal range of motion. Cervical back: Normal range of motion and neck supple. Skin:     General: Skin is warm and dry. Neurological:      Mental Status: He is alert and oriented to person, place, and time. Psychiatric:         Behavior: Behavior normal.     Vitals physical examination stable. Pressure is controlled. I will maintain present medications and care. Reassessment next month and sooner if need be. Eye exams are up-to-date and currently following closely with ophthalmology with some visual change. Diabetic foot exams are stable. Neurovascular intact. Plan Per Assessment:  Shelly Hutson was seen today for discuss labs, diabetes and hypertension. Diagnoses and all orders for this visit:    Essential hypertension  -     URIC ACID; Future  -     Comprehensive Metabolic Panel; Future    Type 2 diabetes mellitus with stage 3b chronic kidney disease, without long-term current use of insulin (HCC)  -     URIC ACID; Future  -     Comprehensive Metabolic Panel; Future    Stage 4 chronic kidney disease (HCC)  -     URIC ACID; Future  -     Comprehensive Metabolic Panel; Future    Hyperlipidemia, unspecified hyperlipidemia type    Hyperuricemia  -     URIC ACID; Future            Return in about 1 month (around 11/8/2021). Malini Carter DO    Note was generated with the assistance of voice recognition software. Document was reviewed however may contain grammatical errors.

## 2021-11-02 ENCOUNTER — HOSPITAL ENCOUNTER (OUTPATIENT)
Dept: CT IMAGING | Age: 56
Discharge: HOME | End: 2021-11-04
Payer: COMMERCIAL

## 2021-11-02 VITALS
WEIGHT: 185 LBS | DIASTOLIC BLOOD PRESSURE: 76 MMHG | SYSTOLIC BLOOD PRESSURE: 127 MMHG | HEART RATE: 54 BPM | RESPIRATION RATE: 20 BRPM | BODY MASS INDEX: 31.58 KG/M2 | OXYGEN SATURATION: 99 % | TEMPERATURE: 97.8 F | HEIGHT: 64 IN

## 2021-11-02 DIAGNOSIS — N18.4 CHRONIC KIDNEY DISEASE, STAGE IV (SEVERE) (HCC): ICD-10-CM

## 2021-11-02 DIAGNOSIS — R80.9 PROTEINURIA, UNSPECIFIED TYPE: ICD-10-CM

## 2021-11-02 DIAGNOSIS — E13.22 OTHER SPECIFIED DIABETES MELLITUS WITH DIABETIC CHRONIC KIDNEY DISEASE, UNSPECIFIED CKD STAGE, UNSPECIFIED WHETHER LONG TERM INSULIN USE (HCC): ICD-10-CM

## 2021-11-02 DIAGNOSIS — I10 ESSENTIAL HYPERTENSION, MALIGNANT: ICD-10-CM

## 2021-11-02 LAB
INR BLD: 1
PROTHROMBIN TIME: 11.3 SEC (ref 9.3–12.4)

## 2021-11-02 PROCEDURE — 50200 RENAL BIOPSY PERQ: CPT | Performed by: RADIOLOGY

## 2021-11-02 PROCEDURE — 85610 PROTHROMBIN TIME: CPT

## 2021-11-02 PROCEDURE — 7100000011 HC PHASE II RECOVERY - ADDTL 15 MIN

## 2021-11-02 PROCEDURE — 2500000003 HC RX 250 WO HCPCS: Performed by: RADIOLOGY

## 2021-11-02 PROCEDURE — 36415 COLL VENOUS BLD VENIPUNCTURE: CPT

## 2021-11-02 PROCEDURE — 7100000010 HC PHASE II RECOVERY - FIRST 15 MIN

## 2021-11-02 PROCEDURE — 77012 CT SCAN FOR NEEDLE BIOPSY: CPT | Performed by: RADIOLOGY

## 2021-11-02 PROCEDURE — 77012 CT SCAN FOR NEEDLE BIOPSY: CPT

## 2021-11-02 PROCEDURE — 36591 DRAW BLOOD OFF VENOUS DEVICE: CPT

## 2021-11-02 PROCEDURE — 2709999900 CT BIOPSY RENAL

## 2021-11-02 PROCEDURE — 6360000002 HC RX W HCPCS: Performed by: RADIOLOGY

## 2021-11-02 PROCEDURE — 6360000004 HC RX CONTRAST MEDICATION: Performed by: RADIOLOGY

## 2021-11-02 RX ORDER — HYDRALAZINE HYDROCHLORIDE 20 MG/ML
10 INJECTION INTRAMUSCULAR; INTRAVENOUS ONCE
Status: DISCONTINUED | OUTPATIENT
Start: 2021-11-02 | End: 2021-11-05 | Stop reason: HOSPADM

## 2021-11-02 RX ORDER — HYDRALAZINE HYDROCHLORIDE 20 MG/ML
10 INJECTION INTRAMUSCULAR; INTRAVENOUS ONCE
Status: COMPLETED | OUTPATIENT
Start: 2021-11-02 | End: 2021-11-02

## 2021-11-02 RX ORDER — LABETALOL HYDROCHLORIDE 5 MG/ML
10 INJECTION, SOLUTION INTRAVENOUS ONCE
Status: COMPLETED | OUTPATIENT
Start: 2021-11-02 | End: 2021-11-02

## 2021-11-02 RX ORDER — HYDRALAZINE HYDROCHLORIDE 10 MG/1
10 TABLET, FILM COATED ORAL ONCE
Status: DISCONTINUED | OUTPATIENT
Start: 2021-11-02 | End: 2021-11-02 | Stop reason: CLARIF

## 2021-11-02 RX ORDER — LIDOCAINE HYDROCHLORIDE 20 MG/ML
INJECTION, SOLUTION INFILTRATION; PERINEURAL
Status: COMPLETED | OUTPATIENT
Start: 2021-11-02 | End: 2021-11-02

## 2021-11-02 RX ADMIN — LIDOCAINE HYDROCHLORIDE 5 ML: 20 INJECTION, SOLUTION INFILTRATION; PERINEURAL at 14:11

## 2021-11-02 RX ADMIN — IOPAMIDOL 1 ML: 612 INJECTION, SOLUTION INTRAVENOUS at 14:21

## 2021-11-02 RX ADMIN — LABETALOL HYDROCHLORIDE 10 MG: 5 INJECTION INTRAVENOUS at 13:29

## 2021-11-02 RX ADMIN — Medication 1 KIT: at 14:14

## 2021-11-02 RX ADMIN — HYDRALAZINE HYDROCHLORIDE 10 MG: 20 INJECTION INTRAMUSCULAR; INTRAVENOUS at 13:59

## 2021-11-02 RX ADMIN — LABETALOL HYDROCHLORIDE 10 MG: 5 INJECTION INTRAVENOUS at 13:38

## 2021-11-02 NOTE — PROCEDURES
1425 Patient returned from procedure. Dressing checked, clean, dry, and intact. Patient stable. No s/s of complications noted or reported. Vitals will be checked q 15min, see flow sheets. Patient eating and drinking well with no s/s of complications noted or reported. 1500Patient discharged, site was checked with every set of vitals. Site clean dry and intact. Discharge papers reviewed with patient, questions answered, discharge paper signed. Patient taken to door. Patient in stable condition, no s/s of complications noted or reported.

## 2021-11-02 NOTE — BRIEF OP NOTE
Brief Postoperative Note    Chidi Royal  YOB: 1965  [id number]    Pre-operative Diagnosis and Procedure: CKD stage 4, plan biopsy    Post-operative Diagnosis: Same    Anesthesia: Local    Estimated Blood Loss: < 10 cc    Surgeon: Diana HICKMAN     Complications: none    Specimen obtained: none     Findings: none     Britany Shoemaker II, MD   11/2/2021 1:03 PM

## 2021-11-02 NOTE — POST SEDATION
POST SEDATION NOTE:  Time: 1:03 PM    Cardiopulmonary: Vitals Signs Stable: yes    Level of Consciousness: alert    Reversal Agent Used: No    Complications: none    Follow-up/Observations: none    Pain Score: 1    Clifton Sicard, MD

## 2021-11-02 NOTE — PRE SEDATION
Rae Daley II, MD  11/2/2021  1:02 PM        PRE-SEDATION PHYSICIAN ASSESSMENT:      1. HISTORY & PHYSICAL EXAMINATION:  Comments: none    Vitals:    11/02/21 1048   BP: (!) 167/81   Pulse: 54   Resp: 18   Temp: 97.5 °F (36.4 °C)   SpO2: 99%       Allergies: Patient has no known allergies. 2. Heart and Lungs immediately prior to procedure demonstrate no contraindications to proceed      Chief Complaint: <principal problem not specified>    Drug: unknown  Tobacco: unknown    3. PAST ANESTHESIA EXPERIENCE:  unknown. 4. AIRWAY/TEETH/HEAD & NECK(Mallampati Classification):  II (soft palate, uvula, fauces visible)    5: NORMAL RANGE OF MOTION OF NECK: No    6. PATIENT WILL LIKELY TOLERATE PLAN OF MODERATE SEDATION    7. ASA 2.     Veronica Linton MD

## 2021-11-02 NOTE — H&P
Interventional Radiology  Attending Pre-operative History and Physical    DIAGNOSIS:    Patient Active Problem List   Diagnosis    Acute respiratory failure (HCC)    Moderate obesity    Type 2 diabetes mellitus with stage 3 chronic kidney disease, without long-term current use of insulin ([facility]    Hypertensive urgency    Acute diastolic CHF (congestive heart failure) (HCC)    Bilateral pleural effusion    Pericardial effusion    Nonischemic cardiomyopathy ([facility]    Essential hypertension    Chronic diastolic heart failure ([facility]    Pure hypercholesterolemia    Hyperlipidemia    Stage 4 chronic kidney disease ([facility]       CHIEF COMPLAINT: <principal problem not specified>          Current Outpatient Medications:     timolol (BETIMOL) 0.25 % ophthalmic solution, 1-2 drops 2 times daily, Disp: , Rfl:     NIFEdipine (ADALAT CC) 30 MG extended release tablet, , Disp: , Rfl:     glipiZIDE (GLUCOTROL XL) 5 MG extended release tablet, 2 by mouth bid, Disp: 360 tablet, Rfl: 3    isosorbide dinitrate (ISORDIL) 20 MG tablet, 2 tablets 3 times a day, Disp: 540 tablet, Rfl: 3    benazepril-hydrochlorthiazide (LOTENSIN HCT) 20-12.5 MG per tablet, 1am 1pm, Disp: 60 tablet, Rfl: 3    furosemide (LASIX) 40 MG tablet, Take 1 tablet by mouth 2 times daily, Disp: 60 tablet, Rfl: 5    JANUVIA 100 MG tablet, Take 1 tablet by mouth daily, Disp: 30 tablet, Rfl: 5    metoprolol succinate (TOPROL XL) 50 MG extended release tablet, Take 1 tablet by mouth 2 times daily, Disp: 180 tablet, Rfl: 3    atorvastatin (LIPITOR) 40 MG tablet, Take 1 tablet by mouth nightly, Disp: 90 tablet, Rfl: 3    ONE TOUCH LANCETS MISC, Test one time daily. Uses mini glucometer, Disp: , Rfl:     blood glucose test strips (ASCENSIA AUTODISC VI;ONE TOUCH ULTRA TEST VI) strip, Test one time daily.   Uses One Touch ultra, Disp: , Rfl:     No Known Allergies    Past Medical History:   Diagnosis Date    Cardiomyopathy ([facility]     CH[facility]estive [facility] failure) ([facility]     CKD (chronic kidney disease)     HTN (hypertension)     Hypercholesterolemia     Medically noncompliant     Non-insulin dependent type 2 diabetes mellitus ([facility]     Obesity        Past Surgical History:   Procedure Laterality Date    TONSILLECTOMY      VITRECTOMY Right 8/16/2021    PARS PLANA VITRECTOMY PAN RETINAL PHOTOAGULATION LASER GAS FLUID EXCHANGE RIGHT EYE performed by Stephen Sebastian MD at [address] [place] [address]       Family History   Problem Relation Age of Onset    High Blood Pressure Mother     High Cholesterol Mother     High Blood Pressure Father     Breast Cancer Sister     Hypertension Brother     Hypertension Sister     No Known Problems Brother        Social History     Socioeconomic History    Marital status: Single     Spouse name: Not on file    Number of children: Not on file    Years of education: Not on file    Highest education level: Not on file   Occupational History    Not on file   Tobacco Use    Smoking status: Never Smoker    Smokeless tobacco: Never Used   Substance and Sexual Activity    Alcohol use: Yes     Alcohol/week: 2.0 standard drinks     Types: 2 Cans of beer per week    Drug use: No    Sexual activity: Never   Other Topics Concern    Not on file   Social History Narrative    Drinks occ iced tea     Social Determinants of Health     Financial Resource Strain:     Difficulty of Paying Living Expenses:    Food Insecurity:     Worried About Running Out of Food in the Last Year:     Ran Out of Food in the Last Year:    Transportation Needs:     Lack of Transportation (Medical):      Lack of Transportation (Non-Medical):    Physical Activity:     Days of Exercise per Week:     Minutes of Exercise per Session:    Stress:     Feeling of Stress :    Social Connections:     Frequency of Communication with Friends and Family:     Frequency of Social Gatherings with Friends and Family:     Attends [employer] Services:     Active Member of Clubs or Organizations:     Attends Club or Organization Meetings:     Marital Status:    Intimate Partner Violence:     Fear of Current or Ex-Partner:     Emotionally Abused:     Physically Abused:     Sexually Abused:        ROS: Non-contributory other than as noted above    PHYSICAL EXAM:      Heart and Lungs:  demonstrate no contraindications to proceed    DATA:  CBC:   Lab Results   Component Value Date    WBC 7.4 10/29/2021    RBC 3.66 10/29/2021    HGB 10.6 10/29/2021    HCT 31.9 10/29/2021    MCV 87.2 10/29/2021    MCH 29.0 10/29/2021    MCHC 33.2 10/29/2021    RDW 12.5 10/29/2021     10/29/2021    MPV 10.7 10/29/2021     CBC with Differential:    Lab Results   Component Value Date    WBC 7.4 10/29/2021    RBC 3.66 10/29/2021    HGB 10.6 10/29/2021    HCT 31.9 10/29/2021     10/29/2021    MCV 87.2 10/29/2021    MCH 29.0 10/29/2021    MCHC 33.2 10/29/2021    RDW 12.5 10/29/2021    LYMPHOPCT 25.9 10/11/2021    MONOPCT 8.9 10/11/2021    BASOPCT 0.8 10/11/2021    MONOSABS 0.53 10/11/2021    LYMPHSABS 1.55 10/11/2021    EOSABS 0.21 10/11/2021    BASOSABS 0.05 10/11/2021     Platelets:    Lab Results   Component Value Date     10/29/2021     BUN/Creatinine:    Lab Results   Component Value Date    BUN 88 10/29/2021    CREATININE 3.2 10/29/2021       ASSESSMENT AND PLAN:  1.   CKD stage 4, plan biopsy  2. Procedure options, risks and benefits reviewed with patient. Patient expresses understanding.     Electronically signed by Thaddeus Anaya II, MD on 11/2/2021 at 1:02 PM

## 2021-11-02 NOTE — PROGRESS NOTES
Pt here for renal biopsy. History and medications reviewed, iv labs initiated. Consent was signed. Family is at the bedside. Pt is aware of plan of care.

## 2021-11-10 ENCOUNTER — OFFICE VISIT (OUTPATIENT)
Dept: PRIMARY CARE CLINIC | Age: 56
End: 2021-11-10
Payer: COMMERCIAL

## 2021-11-10 VITALS — SYSTOLIC BLOOD PRESSURE: 136 MMHG | DIASTOLIC BLOOD PRESSURE: 80 MMHG | OXYGEN SATURATION: 98 % | HEART RATE: 55 BPM

## 2021-11-10 DIAGNOSIS — E78.5 HYPERLIPIDEMIA, UNSPECIFIED HYPERLIPIDEMIA TYPE: ICD-10-CM

## 2021-11-10 DIAGNOSIS — E11.22 TYPE 2 DIABETES MELLITUS WITH STAGE 3B CHRONIC KIDNEY DISEASE, WITHOUT LONG-TERM CURRENT USE OF INSULIN (HCC): ICD-10-CM

## 2021-11-10 DIAGNOSIS — I10 ESSENTIAL HYPERTENSION: Primary | ICD-10-CM

## 2021-11-10 DIAGNOSIS — N18.32 TYPE 2 DIABETES MELLITUS WITH STAGE 3B CHRONIC KIDNEY DISEASE, WITHOUT LONG-TERM CURRENT USE OF INSULIN (HCC): ICD-10-CM

## 2021-11-10 DIAGNOSIS — N18.4 STAGE 4 CHRONIC KIDNEY DISEASE (HCC): ICD-10-CM

## 2021-11-10 PROCEDURE — 2022F DILAT RTA XM EVC RTNOPTHY: CPT | Performed by: FAMILY MEDICINE

## 2021-11-10 PROCEDURE — G8417 CALC BMI ABV UP PARAM F/U: HCPCS | Performed by: FAMILY MEDICINE

## 2021-11-10 PROCEDURE — 3017F COLORECTAL CA SCREEN DOC REV: CPT | Performed by: FAMILY MEDICINE

## 2021-11-10 PROCEDURE — 99214 OFFICE O/P EST MOD 30 MIN: CPT | Performed by: FAMILY MEDICINE

## 2021-11-10 PROCEDURE — G8428 CUR MEDS NOT DOCUMENT: HCPCS | Performed by: FAMILY MEDICINE

## 2021-11-10 PROCEDURE — G8484 FLU IMMUNIZE NO ADMIN: HCPCS | Performed by: FAMILY MEDICINE

## 2021-11-10 PROCEDURE — 1036F TOBACCO NON-USER: CPT | Performed by: FAMILY MEDICINE

## 2021-11-10 PROCEDURE — 3044F HG A1C LEVEL LT 7.0%: CPT | Performed by: FAMILY MEDICINE

## 2021-11-10 RX ORDER — ACETAMINOPHEN 160 MG
TABLET,DISINTEGRATING ORAL
COMMUNITY

## 2021-11-10 RX ORDER — METOPROLOL SUCCINATE 50 MG/1
50 TABLET, EXTENDED RELEASE ORAL 2 TIMES DAILY
Qty: 180 TABLET | Refills: 3 | Status: SHIPPED
Start: 2021-11-10 | End: 2022-10-31 | Stop reason: SDUPTHER

## 2021-11-10 ASSESSMENT — ENCOUNTER SYMPTOMS
EYES NEGATIVE: 1
GASTROINTESTINAL NEGATIVE: 1
RESPIRATORY NEGATIVE: 1
ALLERGIC/IMMUNOLOGIC NEGATIVE: 1

## 2021-11-10 NOTE — PROGRESS NOTES
11/10/21     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 Chief Complaint   Patient presents with   [address]    Hypertension    Diabetes    Other     had kidney biopsy last week       Prior to Admission medications    Medication Sig Start Date End Date Taking? Authorizing Provider   Cholecalciferol (VITAMIN D3) 50 MCG (2000) CAPS Take by mouth   Yes Historical Provider, MD   metoprolol succinate (TOPROL XL) 50 MG extended release tablet Take 1 tablet by mouth 2 times daily 11/10/21  Yes Katie Franco DO   timolol (BETIMOL) 0.25 % ophthalmic solution 1-2 drops 2 times daily   Yes Historical Provider, MD   NIFEdipine (ADALAT CC) 30 MG extended release tablet  21  Yes Historical Provider, MD   glipiZIDE (GLUCOTROL XL) 5 MG extended release tablet 2 by mouth bid 9/3/21  Yes Katie Franco DO   isosorbide dinitrate (ISORDIL) 20 MG tablet 2 tablets 3 times a day 9/3/21  Yes Kit Franco, DO   benazepril-hydrochlorthiazide (LOTENSIN HCT) 20-12.5 MG per tablet 1am 1pm 9/3/21  Yes Katie Franco DO   furosemide (LASIX) 40 MG tablet Take 1 tablet by mouth 2 times daily 21  Yes Katie Franco DO   JANUVIA 100 MG tablet Take 1 tablet by mouth daily 21  Yes Katie Franco DO   atorvastatin (LIPITOR) 40 MG tablet Take 1 tablet by mouth nightly 21  Yes Katie Franco DO   ONE TOUCH LANCETS MISC Test one time daily. Uses mini glucometer   Yes Historical Provider, MD   blood glucose test strips (ASCENSIA AUTODISC VI;ONE TOUCH ULTRA TEST VI) strip Test one time daily. Uses One Touch ultra   Yes Historical Provider, MD          HPI: Evaluated today with hypertension diabetes chronic kidney disease hyperlipidemia all of which is been fairly stable. Medications reviewed and will continue as prescribed. Systems review overall stable at this time. Review of Systems   Constitutional: Negative. HENT: Negative. Eyes: Negative. Respiratory: Negative. Gastrointestinal: Negative. Endocrine: Negative. Genitourinary: Negative. Musculoskeletal: Negative. Skin: Negative. Allergic/Immunologic: Negative. Neurological: Negative. Hematological: Negative. Psychiatric/Behavioral: Negative. Current Outpatient Medications:     Cholecalciferol (VITAMIN D3) 50 MCG (2000 UT) CAPS, Take by mouth, Disp: , Rfl:     metoprolol succinate (TOPROL XL) 50 MG extended release tablet, Take 1 tablet by mouth 2 times daily, Disp: 180 tablet, Rfl: 3    timolol (BETIMOL) 0.25 % ophthalmic solution, 1-2 drops 2 times daily, Disp: , Rfl:     NIFEdipine (ADALAT CC) 30 MG extended release tablet, , Disp: , Rfl:     glipiZIDE (GLUCOTROL XL) 5 MG extended release tablet, 2 by mouth bid, Disp: 360 tablet, Rfl: 3    isosorbide dinitrate (ISORDIL) 20 MG tablet, 2 tablets 3 times a day, Disp: 540 tablet, Rfl: 3    benazepril-hydrochlorthiazide (LOTENSIN HCT) 20-12.5 MG per tablet, 1am 1pm, Disp: 60 tablet, Rfl: 3    furosemide (LASIX) 40 MG tablet, Take 1 tablet by mouth 2 times daily, Disp: 60 tablet, Rfl: 5    JANUVIA 100 MG tablet, Take 1 tablet by mouth daily, Disp: 30 tablet, Rfl: 5    atorvastatin (LIPITOR) 40 MG tablet, Take 1 tablet by mouth nightly, Disp: 90 tablet, Rfl: 3    ONE TOUCH LANCETS MISC, Test one time daily. Uses mini glucometer, Disp: , Rfl:     blood glucose test strips (ASCENSIA AUTODISC VI;ONE TOUCH ULTRA TEST VI) strip, Test one time daily. Uses One Touch ultra, Disp: , Rfl:     No Known Allergies    Social History     Tobacco Use    Smoking status: Never Smoker    Smokeless tobacco: Never Used   Substance Use Topics    Alcohol use:  Yes     Alcohol/week: 2.0 standard drinks     Types: 2 Cans of beer per week    Drug use: No      Past Surgical History:   Procedure Laterality Date    CT BIOPSY RENAL  11/2/2021    CT BIOPSY RENAL 11/2/2021 Tom Gibbs MD [facility]    TONSILLECTOMY      VITRECTOMY Right 8/16/2021    PARS PLANA VITRECTOMY PAN RETINAL PHOTOAGULATION LASER GAS FLUID EXCHANGE RIGHT EYE performed by Acacia Bui MD at [address] [place] [address]     Family History   Problem Relation Age of Onset    High Blood Pressure Mother     High Cholesterol Mother     High Blood Pressure Father     Breast Cancer Sister     Hypertension Brother     Hypertension Sister     No Known Problems Brother      Past Medical History:   Diagnosis Date    [facility]omyopathy [facility])     [facility] [address] [address]     CKD (chronic kidney disease)     HTN (hypertension)     Hypercholesterolemia     Medically noncompliant     Non-insulin dependent type 2 diabetes mellitus ([facility] [address]     Obesity        Vitals:    11/10/21 1114   BP: 136/80   Pulse: 55   SpO2: 98%     BP Readings from Last 3 Encounters:   11/10/21 136/80   11/02/21 127/76   10/08/21 138/80    128/70    Physical Exam  Vitals and nursing note reviewed. Constitutional:       Appearance: He is well-developed. HENT:      Head: Normocephalic. Right Ear: External ear normal.      Left Ear: External ear normal.      Nose: Nose normal.   Eyes:      Conjunctiva/sclera: Conjunctivae normal.      Pupils: Pupils are equal, round, and reactive to light. Cardiovascular:      Rate and Rhythm: Normal rate. Pulmonary:      Breath sounds: Normal breath sounds. Abdominal:      General: Bowel sounds are normal.      Palpations: Abdomen is soft. Musculoskeletal:         General: Normal range of motion. Cervical back: Normal range of motion and neck supple. Skin:     General: Skin is warm and dry. Neurological:      Mental Status: He is alert and oriented to person, place, and time. Psychiatric:         Behavior: Behavior normal.     Today's vitals physical examination stable. Medications reviewed continue as prescribed. Blood pressure under much better control. Reassessment 6 weeks and blood work just prior.   Endocrinology consultation discussed and will have further discussion when he returns and possibly make referral at that time. Plan Per Assessment:  Júnior Wallace was seen today for discuss labs, hypertension, diabetes and other. Diagnoses and all orders for this visit:    Essential hypertension  -     CBC Auto Differential; Future  -     Comprehensive Metabolic Panel; Future  -     Lipid Panel; Future  -     T4; Future  -     TSH without Reflex; Future  -     Hemoglobin A1C; Future    Type 2 diabetes mellitus with stage 3b chronic kidney disease, without long-term current use of insulin (HCC)  -     CBC Auto Differential; Future  -     Comprehensive Metabolic Panel; Future  -     Lipid Panel; Future  -     T4; Future  -     TSH without Reflex; Future  -     Hemoglobin A1C; Future    Stage 4 chronic kidney disease (HCC)  -     CBC Auto Differential; Future  -     Comprehensive Metabolic Panel; Future  -     Lipid Panel; Future  -     T4; Future  -     TSH without Reflex; Future  -     Hemoglobin A1C; Future    Hyperlipidemia, unspecified hyperlipidemia type  -     CBC Auto Differential; Future  -     Comprehensive Metabolic Panel; Future  -     Lipid Panel; Future  -     T4; Future  -     TSH without Reflex; Future  -     Hemoglobin A1C; Future    Other orders  -     metoprolol succinate (TOPROL XL) 50 MG extended release tablet; Take 1 tablet by mouth 2 times daily            Return in about 6 weeks (around 12/22/2021). Mar Runner, DO    Note was generated with the assistance of voice recognition software. Document was reviewed however may contain grammatical errors.

## 2022-01-05 ENCOUNTER — OFFICE VISIT (OUTPATIENT)
Dept: PRIMARY CARE CLINIC | Age: 57
End: 2022-01-05
Payer: COMMERCIAL

## 2022-01-05 VITALS
BODY MASS INDEX: 33.81 KG/M2 | DIASTOLIC BLOOD PRESSURE: 72 MMHG | HEART RATE: 60 BPM | OXYGEN SATURATION: 98 % | TEMPERATURE: 98.7 F | WEIGHT: 197 LBS | SYSTOLIC BLOOD PRESSURE: 136 MMHG

## 2022-01-05 DIAGNOSIS — R20.0 BILATERAL HAND NUMBNESS: ICD-10-CM

## 2022-01-05 DIAGNOSIS — N18.4 STAGE 4 CHRONIC KIDNEY DISEASE (HCC): ICD-10-CM

## 2022-01-05 DIAGNOSIS — N18.32 TYPE 2 DIABETES MELLITUS WITH STAGE 3B CHRONIC KIDNEY DISEASE, WITHOUT LONG-TERM CURRENT USE OF INSULIN (HCC): Primary | ICD-10-CM

## 2022-01-05 DIAGNOSIS — E78.5 HYPERLIPIDEMIA, UNSPECIFIED HYPERLIPIDEMIA TYPE: ICD-10-CM

## 2022-01-05 DIAGNOSIS — E11.22 TYPE 2 DIABETES MELLITUS WITH STAGE 3B CHRONIC KIDNEY DISEASE, WITHOUT LONG-TERM CURRENT USE OF INSULIN (HCC): Primary | ICD-10-CM

## 2022-01-05 DIAGNOSIS — I10 ESSENTIAL HYPERTENSION: ICD-10-CM

## 2022-01-05 PROCEDURE — 1036F TOBACCO NON-USER: CPT | Performed by: FAMILY MEDICINE

## 2022-01-05 PROCEDURE — 3046F HEMOGLOBIN A1C LEVEL >9.0%: CPT | Performed by: FAMILY MEDICINE

## 2022-01-05 PROCEDURE — 2022F DILAT RTA XM EVC RTNOPTHY: CPT | Performed by: FAMILY MEDICINE

## 2022-01-05 PROCEDURE — G8427 DOCREV CUR MEDS BY ELIG CLIN: HCPCS | Performed by: FAMILY MEDICINE

## 2022-01-05 PROCEDURE — G8417 CALC BMI ABV UP PARAM F/U: HCPCS | Performed by: FAMILY MEDICINE

## 2022-01-05 PROCEDURE — 99214 OFFICE O/P EST MOD 30 MIN: CPT | Performed by: FAMILY MEDICINE

## 2022-01-05 PROCEDURE — G8484 FLU IMMUNIZE NO ADMIN: HCPCS | Performed by: FAMILY MEDICINE

## 2022-01-05 PROCEDURE — 3017F COLORECTAL CA SCREEN DOC REV: CPT | Performed by: FAMILY MEDICINE

## 2022-01-05 ASSESSMENT — ENCOUNTER SYMPTOMS
ALLERGIC/IMMUNOLOGIC NEGATIVE: 1
EYES NEGATIVE: 1
GASTROINTESTINAL NEGATIVE: 1
RESPIRATORY NEGATIVE: 1

## 2022-01-05 NOTE — PROGRESS NOTES
22     Elinor Rushing    : 1965 Sex: male   Age: 55 y.o. Chief Complaint   Patient presents with    Diabetes    Hypertension    Numbness     fingertips in left hand and numbness in arm at night       Prior to Admission medications    Medication Sig Start Date End Date Taking? Authorizing Provider   Cholecalciferol (VITAMIN D3) 50 MCG (2000) CAPS Take by mouth   Yes Historical Provider, MD   metoprolol succinate (TOPROL XL) 50 MG extended release tablet Take 1 tablet by mouth 2 times daily 11/10/21  Yes Reanna Franco DO   timolol (BETIMOL) 0.25 % ophthalmic solution 1-2 drops 2 times daily   Yes Historical Provider, MD   NIFEdipine (ADALAT CC) 30 MG extended release tablet  21  Yes Historical Provider, MD   glipiZIDE (GLUCOTROL XL) 5 MG extended release tablet 2 by mouth bid 9/3/21  Yes Reanna Franco DO   isosorbide dinitrate (ISORDIL) 20 MG tablet 2 tablets 3 times a day 9/3/21  Yes Kit Franco DO   benazepril-hydrochlorthiazide (LOTENSIN HCT) 20-12.5 MG per tablet 1am 1pm 9/3/21  Yes Reanna Franco DO   furosemide (LASIX) 40 MG tablet Take 1 tablet by mouth 2 times daily 21  Yes Reanna Franco DO   JANUVIA 100 MG tablet Take 1 tablet by mouth daily 21  Yes Reanna Franco DO   atorvastatin (LIPITOR) 40 MG tablet Take 1 tablet by mouth nightly 21  Yes Reanna Franco DO   ONE TOUCH LANCETS MISC Test one time daily. Uses mini glucometer   Yes Historical Provider, MD   blood glucose test strips (ASCENSIA AUTODISC VI;ONE TOUCH ULTRA TEST VI) strip Test one time daily. Uses One Touch ultra   Yes Historical Provider, MD          HPI: Patient evaluated today she is hypertension chronic kidney disease hyperlipidemia hypertension all of which has been under fair control. Medications seem to be well-tolerated feeling wellbeing stable.   Bilateral hand numbness tingling and recommending possible EMG nerve conduction and will arrange with  Pawel Morrissey. Onset was about a month ago. Numbness and tingling radiates into the upper arms at night. Review of Systems   Constitutional: Negative. HENT: Negative. Eyes: Negative. Respiratory: Negative. Gastrointestinal: Negative. Endocrine: Negative. Genitourinary: Negative. Musculoskeletal: Negative. Skin: Negative. Allergic/Immunologic: Negative. Neurological: Negative. Hematological: Negative. Psychiatric/Behavioral: Negative. Systems review overall stable aside from numbness tingling as noted. Current Outpatient Medications:     Cholecalciferol (VITAMIN D3) 50 MCG (2000 UT) CAPS, Take by mouth, Disp: , Rfl:     metoprolol succinate (TOPROL XL) 50 MG extended release tablet, Take 1 tablet by mouth 2 times daily, Disp: 180 tablet, Rfl: 3    timolol (BETIMOL) 0.25 % ophthalmic solution, 1-2 drops 2 times daily, Disp: , Rfl:     NIFEdipine (ADALAT CC) 30 MG extended release tablet, , Disp: , Rfl:     glipiZIDE (GLUCOTROL XL) 5 MG extended release tablet, 2 by mouth bid, Disp: 360 tablet, Rfl: 3    isosorbide dinitrate (ISORDIL) 20 MG tablet, 2 tablets 3 times a day, Disp: 540 tablet, Rfl: 3    benazepril-hydrochlorthiazide (LOTENSIN HCT) 20-12.5 MG per tablet, 1am 1pm, Disp: 60 tablet, Rfl: 3    furosemide (LASIX) 40 MG tablet, Take 1 tablet by mouth 2 times daily, Disp: 60 tablet, Rfl: 5    JANUVIA 100 MG tablet, Take 1 tablet by mouth daily, Disp: 30 tablet, Rfl: 5    atorvastatin (LIPITOR) 40 MG tablet, Take 1 tablet by mouth nightly, Disp: 90 tablet, Rfl: 3    ONE TOUCH LANCETS MISC, Test one time daily. Uses mini glucometer, Disp: , Rfl:     blood glucose test strips (ASCENSIA AUTODISC VI;ONE TOUCH ULTRA TEST VI) strip, Test one time daily.   Uses One Touch ultra, Disp: , Rfl:     No Known Allergies    Social History     Tobacco Use    Smoking status: Never Smoker    Smokeless tobacco: Never Used   Substance Use Topics    Alcohol use: Yes     Alcohol/week: 2.0 standard drinks     Types: 2 Cans of beer per week    Drug use: No      Past Surgical History:   Procedure Laterality Date    CT BIOPSY RENAL  11/2/2021    CT BIOPSY RENAL 11/2/2021 Esmer Fernández MD [facility]    TONSILLECTOMY      VITRECTOMY Right 8/16/2021    PARS PLANA VITRECTOMY PAN RETINAL PHOTOAGULATION LASER GAS FLUID EXCHANGE RIGHT EYE performed by Michelet Curiel MD at [address] [place] [address]     Family History   Problem Relation Age of Onset    High Blood Pressure Mother     High Cholesterol Mother     High Blood Pressure Father     Breast Cancer Sister     Hypertension Brother     Hypertension Sister     No Known Problems Brother      Past Medical History:   Diagnosis Date    Cardiomyopathy [facility])     CHF (congestive heart failure) (HCC)     CKD (chronic kidney disease)     HTN (hypertension)     Hypercholesterolemia     Medically noncompliant     Non-insulin dependent type 2 diabetes mellitus ([facility] [address]     Obesity        Vitals:    01/05/22 1026   BP: 136/72   Pulse: 60   Temp: 98.7 °F (37.1 °C)   SpO2: 98%   Weight: 197 lb (89.4 kg)     BP Readings from Last 3 Encounters:   01/05/22 136/72   11/10/21 136/80   11/02/21 127/76      140/70  Physical Exam  Vitals and nursing note reviewed. Constitutional:       Appearance: He is well-developed. HENT:      Head: Normocephalic. Right Ear: External ear normal.      Left Ear: External ear normal.      Nose: Nose normal.   Eyes:      Conjunctiva/sclera: Conjunctivae normal.      Pupils: Pupils are equal, round, and reactive to light. Cardiovascular:      Rate and Rhythm: Normal rate. Pulmonary:      Breath sounds: Normal breath sounds. Abdominal:      General: Bowel sounds are normal.      Palpations: Abdomen is soft. Musculoskeletal:         General: Normal range of motion. Cervical back: Normal range of motion and neck supple. Skin:     General: Skin is warm and dry.    Neurological:      Mental Status: He is alert and oriented to person, place, and time. Psychiatric:         Behavior: Behavior normal.        Today's vitals physical exam stable. We will sit tight with current meds and care. Lab studies to be completed and reviewed next visit. Referral made today for Dr. aMrvin Niño. Plan Per Assessment:  Linda Mckeon was seen today for diabetes, hypertension and numbness. Diagnoses and all orders for this visit:    Type 2 diabetes mellitus with stage 3b chronic kidney disease, without long-term current use of insulin (HCC)    Stage 4 chronic kidney disease (HCC)    Hyperlipidemia, unspecified hyperlipidemia type    Essential hypertension    Bilateral hand numbness  -     Amb External Referral To Physical Medicine Rehab            Return in about 5 weeks (around 2/9/2022). Jonathan Johnston DO    Note was generated with the assistance of voice recognition software. Document was reviewed however may contain grammatical errors.

## 2022-01-20 LAB — DIABETIC RETINOPATHY: NEGATIVE

## 2022-01-20 RX ORDER — SITAGLIPTIN 100 MG/1
100 TABLET, FILM COATED ORAL DAILY
Qty: 30 TABLET | Refills: 5 | Status: SHIPPED
Start: 2022-01-20 | End: 2022-03-09

## 2022-01-20 RX ORDER — FUROSEMIDE 40 MG/1
40 TABLET ORAL 2 TIMES DAILY
Qty: 60 TABLET | Refills: 5 | Status: SHIPPED
Start: 2022-01-20 | End: 2022-07-27 | Stop reason: SDUPTHER

## 2022-01-21 ENCOUNTER — TELEPHONE (OUTPATIENT)
Dept: PRIMARY CARE CLINIC | Age: 57
End: 2022-01-21

## 2022-01-21 NOTE — TELEPHONE ENCOUNTER
Patient looking for results of an EMG done at [facility] on 1/19.  He wanted to make sure we received them

## 2022-01-24 DIAGNOSIS — G56.03 BILATERAL CARPAL TUNNEL SYNDROME: Primary | ICD-10-CM

## 2022-02-07 DIAGNOSIS — N18.32 TYPE 2 DIABETES MELLITUS WITH STAGE 3B CHRONIC KIDNEY DISEASE, WITHOUT LONG-TERM CURRENT USE OF INSULIN (HCC): ICD-10-CM

## 2022-02-07 DIAGNOSIS — E78.5 HYPERLIPIDEMIA, UNSPECIFIED HYPERLIPIDEMIA TYPE: ICD-10-CM

## 2022-02-07 DIAGNOSIS — I10 ESSENTIAL HYPERTENSION: ICD-10-CM

## 2022-02-07 DIAGNOSIS — N18.4 STAGE 4 CHRONIC KIDNEY DISEASE (HCC): ICD-10-CM

## 2022-02-07 DIAGNOSIS — E11.22 TYPE 2 DIABETES MELLITUS WITH STAGE 3B CHRONIC KIDNEY DISEASE, WITHOUT LONG-TERM CURRENT USE OF INSULIN (HCC): ICD-10-CM

## 2022-02-07 LAB
ALBUMIN SERPL-MCNC: 4.1 G/DL (ref 3.5–5.2)
ALP BLD-CCNC: 99 U/L (ref 40–129)
ALT SERPL-CCNC: 17 U/L (ref 0–40)
ANION GAP SERPL CALCULATED.3IONS-SCNC: 12 MMOL/L (ref 7–16)
AST SERPL-CCNC: 17 U/L (ref 0–39)
BASOPHILS ABSOLUTE: 0.06 E9/L (ref 0–0.2)
BASOPHILS RELATIVE PERCENT: 1 % (ref 0–2)
BILIRUB SERPL-MCNC: 0.3 MG/DL (ref 0–1.2)
BUN BLDV-MCNC: 73 MG/DL (ref 6–20)
CALCIUM SERPL-MCNC: 9.5 MG/DL (ref 8.6–10.2)
CHLORIDE BLD-SCNC: 106 MMOL/L (ref 98–107)
CHOLESTEROL, TOTAL: 129 MG/DL (ref 0–199)
CO2: 22 MMOL/L (ref 22–29)
CREAT SERPL-MCNC: 2.8 MG/DL (ref 0.7–1.2)
EOSINOPHILS ABSOLUTE: 0.2 E9/L (ref 0.05–0.5)
EOSINOPHILS RELATIVE PERCENT: 3.4 % (ref 0–6)
FERRITIN: 275 NG/ML
GFR AFRICAN AMERICAN: 29
GFR NON-AFRICAN AMERICAN: 24 ML/MIN/1.73
GLUCOSE BLD-MCNC: 150 MG/DL (ref 74–99)
HBA1C MFR BLD: 6.8 % (ref 4–5.6)
HCT VFR BLD CALC: 28.5 % (ref 37–54)
HDLC SERPL-MCNC: 25 MG/DL
HEMOGLOBIN: 9.3 G/DL (ref 12.5–16.5)
IMMATURE GRANULOCYTES #: 0.02 E9/L
IMMATURE GRANULOCYTES %: 0.3 % (ref 0–5)
IRON % SATURATION: 25 % (ref 20–55)
IRON: 63 MCG/DL (ref 59–158)
LDL CHOLESTEROL CALCULATED: 54 MG/DL (ref 0–99)
LYMPHOCYTES ABSOLUTE: 1.41 E9/L (ref 1.5–4)
LYMPHOCYTES RELATIVE PERCENT: 24 % (ref 20–42)
MAGNESIUM: 2 MG/DL (ref 1.6–2.6)
MCH RBC QN AUTO: 29.4 PG (ref 26–35)
MCHC RBC AUTO-ENTMCNC: 32.6 % (ref 32–34.5)
MCV RBC AUTO: 90.2 FL (ref 80–99.9)
MONOCYTES ABSOLUTE: 0.5 E9/L (ref 0.1–0.95)
MONOCYTES RELATIVE PERCENT: 8.5 % (ref 2–12)
NEUTROPHILS ABSOLUTE: 3.68 E9/L (ref 1.8–7.3)
NEUTROPHILS RELATIVE PERCENT: 62.8 % (ref 43–80)
PARATHYROID HORMONE INTACT: 99 PG/ML (ref 15–65)
PDW BLD-RTO: 12.7 FL (ref 11.5–15)
PHOSPHORUS: 3.9 MG/DL (ref 2.5–4.5)
PLATELET # BLD: 155 E9/L (ref 130–450)
PMV BLD AUTO: 10.1 FL (ref 7–12)
POTASSIUM SERPL-SCNC: 4.9 MMOL/L (ref 3.5–5)
RBC # BLD: 3.16 E12/L (ref 3.8–5.8)
SODIUM BLD-SCNC: 140 MMOL/L (ref 132–146)
T4 TOTAL: 6.4 MCG/DL (ref 4.5–11.7)
TOTAL IRON BINDING CAPACITY: 251 MCG/DL (ref 250–450)
TOTAL PROTEIN: 7 G/DL (ref 6.4–8.3)
TRIGL SERPL-MCNC: 252 MG/DL (ref 0–149)
TSH SERPL DL<=0.05 MIU/L-ACNC: 0.51 UIU/ML (ref 0.27–4.2)
URIC ACID, SERUM: 12.5 MG/DL (ref 3.4–7)
VITAMIN D 25-HYDROXY: 20 NG/ML (ref 30–100)
VLDLC SERPL CALC-MCNC: 50 MG/DL
WBC # BLD: 5.9 E9/L (ref 4.5–11.5)

## 2022-02-09 ENCOUNTER — OFFICE VISIT (OUTPATIENT)
Dept: PRIMARY CARE CLINIC | Age: 57
End: 2022-02-09
Payer: COMMERCIAL

## 2022-02-09 VITALS
DIASTOLIC BLOOD PRESSURE: 72 MMHG | SYSTOLIC BLOOD PRESSURE: 120 MMHG | OXYGEN SATURATION: 97 % | HEART RATE: 80 BPM | TEMPERATURE: 98 F

## 2022-02-09 DIAGNOSIS — E79.0 HYPERURICEMIA: ICD-10-CM

## 2022-02-09 DIAGNOSIS — M17.0 PRIMARY OSTEOARTHRITIS OF BOTH KNEES: ICD-10-CM

## 2022-02-09 DIAGNOSIS — M25.50 ARTHRALGIA, UNSPECIFIED JOINT: ICD-10-CM

## 2022-02-09 DIAGNOSIS — N18.4 STAGE 4 CHRONIC KIDNEY DISEASE (HCC): ICD-10-CM

## 2022-02-09 DIAGNOSIS — I10 ESSENTIAL HYPERTENSION: Primary | ICD-10-CM

## 2022-02-09 DIAGNOSIS — E78.5 HYPERLIPIDEMIA, UNSPECIFIED HYPERLIPIDEMIA TYPE: ICD-10-CM

## 2022-02-09 PROCEDURE — 3017F COLORECTAL CA SCREEN DOC REV: CPT | Performed by: FAMILY MEDICINE

## 2022-02-09 PROCEDURE — G8417 CALC BMI ABV UP PARAM F/U: HCPCS | Performed by: FAMILY MEDICINE

## 2022-02-09 PROCEDURE — 99214 OFFICE O/P EST MOD 30 MIN: CPT | Performed by: FAMILY MEDICINE

## 2022-02-09 PROCEDURE — G8484 FLU IMMUNIZE NO ADMIN: HCPCS | Performed by: FAMILY MEDICINE

## 2022-02-09 PROCEDURE — G8427 DOCREV CUR MEDS BY ELIG CLIN: HCPCS | Performed by: FAMILY MEDICINE

## 2022-02-09 PROCEDURE — 1036F TOBACCO NON-USER: CPT | Performed by: FAMILY MEDICINE

## 2022-02-09 RX ORDER — NIFEDIPINE 30 MG/1
30 TABLET, FILM COATED, EXTENDED RELEASE ORAL DAILY
Qty: 30 TABLET | Refills: 5 | Status: SHIPPED
Start: 2022-02-09 | End: 2022-05-31 | Stop reason: ALTCHOICE

## 2022-02-09 RX ORDER — ALLOPURINOL 100 MG/1
TABLET ORAL
Qty: 90 TABLET | Refills: 1 | Status: SHIPPED
Start: 2022-02-09 | End: 2022-04-05 | Stop reason: SDUPTHER

## 2022-02-09 ASSESSMENT — PATIENT HEALTH QUESTIONNAIRE - PHQ9
SUM OF ALL RESPONSES TO PHQ QUESTIONS 1-9: 0
SUM OF ALL RESPONSES TO PHQ QUESTIONS 1-9: 0
2. FEELING DOWN, DEPRESSED OR HOPELESS: 0
SUM OF ALL RESPONSES TO PHQ QUESTIONS 1-9: 0
SUM OF ALL RESPONSES TO PHQ9 QUESTIONS 1 & 2: 0
1. LITTLE INTEREST OR PLEASURE IN DOING THINGS: 0
SUM OF ALL RESPONSES TO PHQ QUESTIONS 1-9: 0

## 2022-02-09 NOTE — PROGRESS NOTES
22     Coretta Sommer    : 1965 Sex: male   Age: 55 y.o. Chief Complaint   Patient presents with    Discuss Labs    Hypertension    Diabetes    Carpal Tunnel     having surgery with dr Matthew Tolentino      Cataract       Prior to Admission medications    Medication Sig Start Date End Date Taking? Authorizing Provider   NIFEdipine (ADALAT CC) 30 MG extended release tablet Take 1 tablet by mouth daily 22  Yes Chanelle Franco DO   allopurinol (ZYLOPRIM) 100 MG tablet 1 qd x 1 week then 2 qd x 1 week then 3 qd 22  Yes Chanelle Franco DO   furosemide (LASIX) 40 MG tablet Take 1 tablet by mouth 2 times daily 22  Yes Chanelle Franco DO   JANUVIA 100 MG tablet Take 1 tablet by mouth daily 22  Yes Chanelle Franco DO   Cholecalciferol (VITAMIN D3) 50 MCG (2000 UT) CAPS Take by mouth   Yes Historical Provider, MD   metoprolol succinate (TOPROL XL) 50 MG extended release tablet Take 1 tablet by mouth 2 times daily 11/10/21  Yes Chanelle Franco DO   glipiZIDE (GLUCOTROL XL) 5 MG extended release tablet 2 by mouth bid 9/3/21  Yes Chanelle Franco DO   isosorbide dinitrate (ISORDIL) 20 MG tablet 2 tablets 3 times a day 9/3/21  Yes Kit Franco, DO   benazepril-hydrochlorthiazide (LOTENSIN HCT) 20-12.5 MG per tablet 1am 1pm 9/3/21  Yes Chanelle Franco DO   atorvastatin (LIPITOR) 40 MG tablet Take 1 tablet by mouth nightly 21  Yes Chanelle Franco DO   ONE TOUCH LANCETS MISC Test one time daily. Uses mini glucometer   Yes Historical Provider, MD   blood glucose test strips (ASCENSIA AUTODISC VI;ONE TOUCH ULTRA TEST VI) strip Test one time daily. Uses One Touch ultra   Yes Historical Provider, MD          HPI: Anise Nissen is seen today in follow-up on hypertension hyperlipidemia chronic kidney disease arthralgias hyperuricemia. Reviewed labs today and uric acid level significantly elevated about 12.   Addition of allopurinol over the next month and then reassess numbers. Additional work-up for autoimmune possibilities and then further discussion when he returns. Carpal tunnel bilaterally in the hands and will undergo surgical treatment in March. Bilateral cataracts and to schedule for surgery. Review of Systems   Constitutional: Negative. HENT: Negative. Eyes: Negative. Respiratory: Negative. Gastrointestinal: Negative. Endocrine: Negative. Genitourinary: Negative. Musculoskeletal: Negative. Skin: Negative. Allergic/Immunologic: Negative. Neurological: Negative. Hematological: Negative. Psychiatric/Behavioral: Negative. Today system review overall stable. Bilateral knee pain has been persistent and we discussed x-rays but prefers to hold off. Chronic degenerative joint disease. Current Outpatient Medications:     NIFEdipine (ADALAT CC) 30 MG extended release tablet, Take 1 tablet by mouth daily, Disp: 30 tablet, Rfl: 5    allopurinol (ZYLOPRIM) 100 MG tablet, 1 qd x 1 week then 2 qd x 1 week then 3 qd, Disp: 90 tablet, Rfl: 1    furosemide (LASIX) 40 MG tablet, Take 1 tablet by mouth 2 times daily, Disp: 60 tablet, Rfl: 5    JANUVIA 100 MG tablet, Take 1 tablet by mouth daily, Disp: 30 tablet, Rfl: 5    Cholecalciferol (VITAMIN D3) 50 MCG (2000 UT) CAPS, Take by mouth, Disp: , Rfl:     metoprolol succinate (TOPROL XL) 50 MG extended release tablet, Take 1 tablet by mouth 2 times daily, Disp: 180 tablet, Rfl: 3    glipiZIDE (GLUCOTROL XL) 5 MG extended release tablet, 2 by mouth bid, Disp: 360 tablet, Rfl: 3    isosorbide dinitrate (ISORDIL) 20 MG tablet, 2 tablets 3 times a day, Disp: 540 tablet, Rfl: 3    benazepril-hydrochlorthiazide (LOTENSIN HCT) 20-12.5 MG per tablet, 1am 1pm, Disp: 60 tablet, Rfl: 3    atorvastatin (LIPITOR) 40 MG tablet, Take 1 tablet by mouth nightly, Disp: 90 tablet, Rfl: 3    ONE TOUCH LANCETS MISC, Test one time daily.   Uses mini glucometer, Disp: , Rfl:     blood glucose test strips (ASCENSIA AUTODISC VI;ONE TOUCH ULTRA TEST VI) strip, Test one time daily. Uses One Touch ultra, Disp: , Rfl:     No Known Allergies    Social History     Tobacco Use    Smoking status: Never Smoker    Smokeless tobacco: Never Used   Substance Use Topics    Alcohol use: Yes     Alcohol/week: 2.0 standard drinks     Types: 2 Cans of beer per week    Drug use: No      Past Surgical History:   Procedure Laterality Date    CT BIOPSY RENAL  11/2/2021    CT BIOPSY RENAL 11/2/2021 Arcelia Chandler MD [facility]    TONSILLECTOMY      VITRECTOMY Right 8/16/2021    PARS PLANA VITRECTOMY PAN RETINAL PHOTOAGULATION LASER GAS FLUID EXCHANGE RIGHT EYE performed by Kaylin Lou MD at [address] [place] [address]     Family History   Problem Relation Age of Onset    High Blood Pressure Mother     High Cholesterol Mother     High Blood Pressure Father     Breast Cancer Sister     Hypertension Brother     Hypertension Sister     No Known Problems Brother      Past Medical History:   Diagnosis Date    Cardiomyopathy [facility])     CHF (congestive heart failure) (HCC)     CKD (chronic kidney disease)     HTN (hypertension)     Hypercholesterolemia     Medically noncompliant     Non-insulin dependent type 2 diabetes mellitus ([facility] [address]     Obesity        Vitals:    02/09/22 1130   BP: 120/72   Pulse: 80   Temp: 98 °F (36.7 °C)   SpO2: 97%     BP Readings from Last 3 Encounters:   02/09/22 120/72   01/05/22 136/72   11/10/21 136/80        Physical Exam  Vitals and nursing note reviewed. Constitutional:       Appearance: He is well-developed. HENT:      Head: Normocephalic. Right Ear: External ear normal.      Left Ear: External ear normal.      Nose: Nose normal.   Eyes:      Conjunctiva/sclera: Conjunctivae normal.      Pupils: Pupils are equal, round, and reactive to light. Cardiovascular:      Rate and Rhythm: Normal rate. Pulmonary:      Breath sounds: Normal breath sounds.    Abdominal: General: Bowel sounds are normal.      Palpations: Abdomen is soft. Musculoskeletal:         General: Normal range of motion. Cervical back: Normal range of motion and neck supple. Skin:     General: Skin is warm and dry. Neurological:      Mental Status: He is alert and oriented to person, place, and time. Psychiatric:         Behavior: Behavior normal.     Today's vitals physical examination stable. I will sit tight with current meds and care. Reassessment next month and sooner if need be. Plan Per Assessment:  Linda Mckeon was seen today for discuss labs, hypertension, diabetes, carpal tunnel and cataract. Diagnoses and all orders for this visit:    Essential hypertension    Hyperlipidemia, unspecified hyperlipidemia type    Stage 4 chronic kidney disease (HCC)  -     Uric Acid; Future  -     Comprehensive Metabolic Panel; Future  -     Rheumatoid Factor; Future  -     SHARITA; Future    Arthralgia, unspecified joint  -     Uric Acid; Future  -     Comprehensive Metabolic Panel; Future  -     Rheumatoid Factor; Future  -     SHARITA; Future    Primary osteoarthritis of both knees  -     Uric Acid; Future  -     Comprehensive Metabolic Panel; Future  -     Rheumatoid Factor; Future  -     SHARITA; Future    Hyperuricemia  -     Uric Acid; Future  -     Comprehensive Metabolic Panel; Future  -     Rheumatoid Factor; Future  -     SHARITA; Future    Other orders  -     NIFEdipine (ADALAT CC) 30 MG extended release tablet; Take 1 tablet by mouth daily  -     allopurinol (ZYLOPRIM) 100 MG tablet; 1 qd x 1 week then 2 qd x 1 week then 3 qd            Return in about 4 weeks (around 3/9/2022). Jonathan Johnston DO    Note was generated with the assistance of voice recognition software. Document was reviewed however may contain grammatical errors.

## 2022-02-15 ENCOUNTER — TELEPHONE (OUTPATIENT)
Dept: PRIMARY CARE CLINIC | Age: 57
End: 2022-02-15

## 2022-02-15 NOTE — TELEPHONE ENCOUNTER
Will discuss at follow-up. Encourage discussion with his insurance company as to what they would cover.

## 2022-02-15 NOTE — TELEPHONE ENCOUNTER
The pt is starting to be charged $400 for his Januvia 100 mg so he is calling to see if there is anything else he can take

## 2022-02-16 RX ORDER — ATORVASTATIN CALCIUM 40 MG/1
40 TABLET, FILM COATED ORAL NIGHTLY
Qty: 90 TABLET | Refills: 3 | Status: SHIPPED
Start: 2022-02-16 | End: 2022-10-31 | Stop reason: SDUPTHER

## 2022-02-16 RX ORDER — ATORVASTATIN CALCIUM 40 MG/1
40 TABLET, FILM COATED ORAL NIGHTLY
Qty: 90 TABLET | Refills: 3 | Status: SHIPPED
Start: 2022-02-16 | End: 2022-02-16 | Stop reason: SDUPTHER

## 2022-03-03 ENCOUNTER — TELEPHONE (OUTPATIENT)
Dept: PRIMARY CARE CLINIC | Age: 57
End: 2022-03-03

## 2022-03-03 RX ORDER — ERTUGLIFLOZIN 5 MG/1
TABLET, FILM COATED ORAL
Qty: 30 TABLET | Refills: 1 | Status: SHIPPED
Start: 2022-03-03 | End: 2022-03-09

## 2022-03-09 ENCOUNTER — TELEPHONE (OUTPATIENT)
Dept: PRIMARY CARE CLINIC | Age: 57
End: 2022-03-09

## 2022-03-09 ENCOUNTER — OFFICE VISIT (OUTPATIENT)
Dept: PRIMARY CARE CLINIC | Age: 57
End: 2022-03-09
Payer: COMMERCIAL

## 2022-03-09 VITALS — HEART RATE: 84 BPM | DIASTOLIC BLOOD PRESSURE: 82 MMHG | SYSTOLIC BLOOD PRESSURE: 150 MMHG | TEMPERATURE: 98.1 F

## 2022-03-09 DIAGNOSIS — N18.4 STAGE 4 CHRONIC KIDNEY DISEASE (HCC): ICD-10-CM

## 2022-03-09 DIAGNOSIS — I10 ESSENTIAL HYPERTENSION: Primary | ICD-10-CM

## 2022-03-09 DIAGNOSIS — N18.32 TYPE 2 DIABETES MELLITUS WITH STAGE 3B CHRONIC KIDNEY DISEASE, WITHOUT LONG-TERM CURRENT USE OF INSULIN (HCC): ICD-10-CM

## 2022-03-09 DIAGNOSIS — E11.22 TYPE 2 DIABETES MELLITUS WITH STAGE 3B CHRONIC KIDNEY DISEASE, WITHOUT LONG-TERM CURRENT USE OF INSULIN (HCC): ICD-10-CM

## 2022-03-09 PROCEDURE — 2022F DILAT RTA XM EVC RTNOPTHY: CPT | Performed by: FAMILY MEDICINE

## 2022-03-09 PROCEDURE — G8427 DOCREV CUR MEDS BY ELIG CLIN: HCPCS | Performed by: FAMILY MEDICINE

## 2022-03-09 PROCEDURE — 99214 OFFICE O/P EST MOD 30 MIN: CPT | Performed by: FAMILY MEDICINE

## 2022-03-09 PROCEDURE — 3044F HG A1C LEVEL LT 7.0%: CPT | Performed by: FAMILY MEDICINE

## 2022-03-09 PROCEDURE — G8484 FLU IMMUNIZE NO ADMIN: HCPCS | Performed by: FAMILY MEDICINE

## 2022-03-09 PROCEDURE — 1036F TOBACCO NON-USER: CPT | Performed by: FAMILY MEDICINE

## 2022-03-09 PROCEDURE — 3017F COLORECTAL CA SCREEN DOC REV: CPT | Performed by: FAMILY MEDICINE

## 2022-03-09 PROCEDURE — G8417 CALC BMI ABV UP PARAM F/U: HCPCS | Performed by: FAMILY MEDICINE

## 2022-03-09 RX ORDER — EMPAGLIFLOZIN 10 MG/1
1 TABLET, FILM COATED ORAL DAILY
Qty: 30 TABLET | Refills: 5 | Status: SHIPPED
Start: 2022-03-09 | End: 2022-10-31 | Stop reason: SDUPTHER

## 2022-03-09 NOTE — PROGRESS NOTES
3/9/22     Agata Olsen    : 1965 Sex: male   Age: 55 y.o. Chief Complaint   Patient presents with    Hypertension    Diabetes     wants him to try jardiance or farxiga    Gout       Prior to Admission medications    Medication Sig Start Date End Date Taking? Authorizing Provider   empagliflozin (JARDIANCE) 10 MG tablet Take 1 tablet by mouth daily 3/9/22  Yes Philip Franco, DO   atorvastatin (LIPITOR) 40 MG tablet Take 1 tablet by mouth nightly 22  Yes Philip Franco, DO   NIFEdipine (ADALAT CC) 30 MG extended release tablet Take 1 tablet by mouth daily 22  Yes Philip Franco, DO   allopurinol (ZYLOPRIM) 100 MG tablet 1 qd x 1 week then 2 qd x 1 week then 3 qd 22  Yes Philip Franco, DO   furosemide (LASIX) 40 MG tablet Take 1 tablet by mouth 2 times daily 22  Yes Philip Franco, DO   Cholecalciferol (VITAMIN D3) 50 MCG ( UT) CAPS Take by mouth   Yes Historical Provider, MD   metoprolol succinate (TOPROL XL) 50 MG extended release tablet Take 1 tablet by mouth 2 times daily 11/10/21  Yes Philip Franco, DO   glipiZIDE (GLUCOTROL XL) 5 MG extended release tablet 2 by mouth bid 9/3/21  Yes Philip Franco, DO   isosorbide dinitrate (ISORDIL) 20 MG tablet 2 tablets 3 times a day 9/3/21  Yes Kit Franco, DO   benazepril-hydrochlorthiazide (LOTENSIN HCT) 20-12.5 MG per tablet 1am 1pm 9/3/21  Yes Philip Franco, DO   ONE TOUCH LANCETS MISC Test one time daily. Uses mini glucometer   Yes Historical Provider, MD   blood glucose test strips (ASCENSIA AUTODISC VI;ONE TOUCH ULTRA TEST VI) strip Test one time daily. Uses One Touch ultra   Yes Historical Provider, MD          HPI: She is seen today in follow-up on hypertension chronic kidney disease diabetes mellitus all of which is currently stable. Medications reviewed and continue as prescribed. Systems review overall stable. Review of Systems   Constitutional: Negative.     HENT: Negative. Eyes: Negative. Respiratory: Negative. Gastrointestinal: Negative. Endocrine: Negative. Genitourinary: Negative. Musculoskeletal: Negative. Skin: Negative. Allergic/Immunologic: Negative. Neurological: Negative. Hematological: Negative. Psychiatric/Behavioral: Negative. Current Outpatient Medications:     empagliflozin (JARDIANCE) 10 MG tablet, Take 1 tablet by mouth daily, Disp: 30 tablet, Rfl: 5    atorvastatin (LIPITOR) 40 MG tablet, Take 1 tablet by mouth nightly, Disp: 90 tablet, Rfl: 3    NIFEdipine (ADALAT CC) 30 MG extended release tablet, Take 1 tablet by mouth daily, Disp: 30 tablet, Rfl: 5    allopurinol (ZYLOPRIM) 100 MG tablet, 1 qd x 1 week then 2 qd x 1 week then 3 qd, Disp: 90 tablet, Rfl: 1    furosemide (LASIX) 40 MG tablet, Take 1 tablet by mouth 2 times daily, Disp: 60 tablet, Rfl: 5    Cholecalciferol (VITAMIN D3) 50 MCG (2000 UT) CAPS, Take by mouth, Disp: , Rfl:     metoprolol succinate (TOPROL XL) 50 MG extended release tablet, Take 1 tablet by mouth 2 times daily, Disp: 180 tablet, Rfl: 3    glipiZIDE (GLUCOTROL XL) 5 MG extended release tablet, 2 by mouth bid, Disp: 360 tablet, Rfl: 3    isosorbide dinitrate (ISORDIL) 20 MG tablet, 2 tablets 3 times a day, Disp: 540 tablet, Rfl: 3    benazepril-hydrochlorthiazide (LOTENSIN HCT) 20-12.5 MG per tablet, 1am 1pm, Disp: 60 tablet, Rfl: 3    ONE TOUCH LANCETS MISC, Test one time daily. Uses mini glucometer, Disp: , Rfl:     blood glucose test strips (ASCENSIA AUTODISC VI;ONE TOUCH ULTRA TEST VI) strip, Test one time daily. Uses One Touch ultra, Disp: , Rfl:     No Known Allergies    Social History     Tobacco Use    Smoking status: Never Smoker    Smokeless tobacco: Never Used   Substance Use Topics    Alcohol use:  Yes     Alcohol/week: 2.0 standard drinks     Types: 2 Cans of beer per week    Drug use: No      Past Surgical History:   Procedure Laterality Date    CT BIOPSY RENAL  11/2/2021    CT BIOPSY RENAL 11/2/2021 Tomasita Aschoff, MD [facility]    TONSILLECTOMY      VITRECTOMY Right 8/16/2021    [facility] VITRECTOMY PAN RETINAL PHOTOAGULATION LASER GAS FLUID EXCHANGE RIGHT EYE performed by Evelyn Ross MD at [address] [place] [address]     Family History   Problem Relation Age of Onset    High Blood Pressure Mother     High Cholesterol Mother     High Blood Pressure Father     Breast Cancer Sister     Hypertension Brother     Hypertension Sister     No Known Problems Brother      Past Medical History:   Diagnosis Date    Cardiomyopathy [facility])     [facility]est[facility] failure) [facility]     CKD (chronic kidney disease)     HTN (hypertension)     Hypercholesterolemia     Medically noncompliant     Non-insulin dependent type 2 diabetes mellitus ([facility]     Obesity        Vitals:    03/09/22 1108 03/09/22 1110   BP: (!) 154/80 (!) 150/82   Pulse: 84    Temp: 98.1 °F (36.7 °C)      BP Readings from Last 3 Encounters:   03/09/22 (!) 150/82   02/09/22 120/72   01/05/22 136/72    126/82    Physical Exam  Vitals and nursing note reviewed. Constitutional:       Appearance: He is well-developed. HENT:      Head: Normocephalic. Right Ear: External ear normal.      Left Ear: External ear normal.      Nose: Nose normal.   Eyes:      Conjunctiva/sclera: Conjunctivae normal.      Pupils: Pupils are equal, round, and reactive to light. Cardiovascular:      Rate and Rhythm: Normal rate. Pulmonary:      Breath sounds: Normal breath sounds. Abdominal:      General: Bowel sounds are normal.      Palpations: Abdomen is soft. Musculoskeletal:         General: Normal range of motion. Cervical back: Normal range of motion and neck supple. Skin:     General: Skin is warm and dry. Neurological:      Mental Status: He is alert and oriented to person, place, and time. Psychiatric:         Behavior: Behavior normal.     Today's vitals physical exam stable.   Discussed his medications and would like to try Jardiance which I am agreeable to. Insurance options were reviewed and he states that [place] are both allowable medications on his plan. Initiated 10 mg daily Jardiance today reassess in 4 weeks and blood work at that time. Will present with home sugars and blood work that is being completed for nephrology. Plan Per Assessment:  Felipa Chan was seen today for hypertension, diabetes and gout. Diagnoses and all orders for this visit:    Essential hypertension    Stage 4 chronic kidney disease ([address]    Type 2 diabetes mellitus with stage 3b chronic kidney disease, without long-term current use of insulin ([address]    Other orders  -     empagliflozin (JARDIANCE) 10 MG tablet; Take 1 tablet by mouth daily            Return in about 4 weeks (around 4/6/2022). Lennox Ser, DO    Note was generated with the assistance of voice recognition software. Document was reviewed however may contain grammatical errors.

## 2022-04-05 ENCOUNTER — OFFICE VISIT (OUTPATIENT)
Dept: PRIMARY CARE CLINIC | Age: 57
End: 2022-04-05
Payer: COMMERCIAL

## 2022-04-05 VITALS
SYSTOLIC BLOOD PRESSURE: 110 MMHG | WEIGHT: 195 LBS | DIASTOLIC BLOOD PRESSURE: 72 MMHG | OXYGEN SATURATION: 98 % | HEART RATE: 66 BPM | BODY MASS INDEX: 33.47 KG/M2 | TEMPERATURE: 97.7 F

## 2022-04-05 DIAGNOSIS — R20.0 BILATERAL HAND NUMBNESS: ICD-10-CM

## 2022-04-05 DIAGNOSIS — I10 ESSENTIAL HYPERTENSION: ICD-10-CM

## 2022-04-05 DIAGNOSIS — N18.32 TYPE 2 DIABETES MELLITUS WITH STAGE 3B CHRONIC KIDNEY DISEASE, WITHOUT LONG-TERM CURRENT USE OF INSULIN (HCC): Primary | ICD-10-CM

## 2022-04-05 DIAGNOSIS — N18.4 STAGE 4 CHRONIC KIDNEY DISEASE (HCC): ICD-10-CM

## 2022-04-05 DIAGNOSIS — H25.9 AGE-RELATED CATARACT OF BOTH EYES, UNSPECIFIED AGE-RELATED CATARACT TYPE: ICD-10-CM

## 2022-04-05 DIAGNOSIS — E11.22 TYPE 2 DIABETES MELLITUS WITH STAGE 3B CHRONIC KIDNEY DISEASE, WITHOUT LONG-TERM CURRENT USE OF INSULIN (HCC): Primary | ICD-10-CM

## 2022-04-05 DIAGNOSIS — E78.5 HYPERLIPIDEMIA, UNSPECIFIED HYPERLIPIDEMIA TYPE: ICD-10-CM

## 2022-04-05 PROCEDURE — 3017F COLORECTAL CA SCREEN DOC REV: CPT | Performed by: FAMILY MEDICINE

## 2022-04-05 PROCEDURE — G8417 CALC BMI ABV UP PARAM F/U: HCPCS | Performed by: FAMILY MEDICINE

## 2022-04-05 PROCEDURE — 99214 OFFICE O/P EST MOD 30 MIN: CPT | Performed by: FAMILY MEDICINE

## 2022-04-05 PROCEDURE — 2022F DILAT RTA XM EVC RTNOPTHY: CPT | Performed by: FAMILY MEDICINE

## 2022-04-05 PROCEDURE — 1036F TOBACCO NON-USER: CPT | Performed by: FAMILY MEDICINE

## 2022-04-05 PROCEDURE — 3044F HG A1C LEVEL LT 7.0%: CPT | Performed by: FAMILY MEDICINE

## 2022-04-05 PROCEDURE — G8427 DOCREV CUR MEDS BY ELIG CLIN: HCPCS | Performed by: FAMILY MEDICINE

## 2022-04-05 RX ORDER — ALLOPURINOL 300 MG/1
TABLET ORAL
Qty: 90 TABLET | Refills: 2 | Status: SHIPPED
Start: 2022-04-05 | End: 2022-10-31 | Stop reason: SDUPTHER

## 2022-04-05 RX ORDER — ALLOPURINOL 100 MG/1
TABLET ORAL
Qty: 90 TABLET | Refills: 5 | Status: SHIPPED
Start: 2022-04-05 | End: 2022-04-05 | Stop reason: SDUPTHER

## 2022-04-05 NOTE — PROGRESS NOTES
of Systems   Constitutional: Negative. HENT: Negative. Eyes: Negative. Respiratory: Negative. Gastrointestinal: Negative. Endocrine: Negative. Genitourinary: Negative. Musculoskeletal: Negative. Skin: Negative. Allergic/Immunologic: Negative. Neurological: Negative. Hematological: Negative. Psychiatric/Behavioral: Negative. Current Outpatient Medications:     allopurinol (ZYLOPRIM) 300 MG tablet, 1 qd, Disp: 90 tablet, Rfl: 2    empagliflozin (JARDIANCE) 10 MG tablet, Take 1 tablet by mouth daily, Disp: 30 tablet, Rfl: 5    atorvastatin (LIPITOR) 40 MG tablet, Take 1 tablet by mouth nightly, Disp: 90 tablet, Rfl: 3    NIFEdipine (ADALAT CC) 30 MG extended release tablet, Take 1 tablet by mouth daily, Disp: 30 tablet, Rfl: 5    furosemide (LASIX) 40 MG tablet, Take 1 tablet by mouth 2 times daily, Disp: 60 tablet, Rfl: 5    Cholecalciferol (VITAMIN D3) 50 MCG (2000 UT) CAPS, Take by mouth, Disp: , Rfl:     metoprolol succinate (TOPROL XL) 50 MG extended release tablet, Take 1 tablet by mouth 2 times daily, Disp: 180 tablet, Rfl: 3    glipiZIDE (GLUCOTROL XL) 5 MG extended release tablet, 2 by mouth bid, Disp: 360 tablet, Rfl: 3    isosorbide dinitrate (ISORDIL) 20 MG tablet, 2 tablets 3 times a day, Disp: 540 tablet, Rfl: 3    benazepril-hydrochlorthiazide (LOTENSIN HCT) 20-12.5 MG per tablet, 1am 1pm, Disp: 60 tablet, Rfl: 3    ONE TOUCH LANCETS MISC, Test one time daily. Uses mini glucometer, Disp: , Rfl:     blood glucose test strips (ASCENSIA AUTODISC VI;ONE TOUCH ULTRA TEST VI) strip, Test one time daily. Uses One Touch ultra, Disp: , Rfl:     No Known Allergies    Social History     Tobacco Use    Smoking status: Never Smoker    Smokeless tobacco: Never Used   Substance Use Topics    Alcohol use:  Yes     Alcohol/week: 2.0 standard drinks     Types: 2 Cans of beer per week    Drug use: No      Past Surgical History:   Procedure Laterality Date  CT BIOPSY RENAL  11/2/2021    CT BIOPSY RENAL 11/2/2021 Tamara Worthy MD [facility]    TONSILLECTOMY      VITRECTOMY Right 8/16/2021    [facility] VITRECTOMY PAN RETINAL PHOTOAGULATION LASER GAS FLUID EXCHANGE RIGHT EYE performed by Tracie Rodriguez MD at [address] [place] [address]     Family History   Problem Relation Age of Onset    High Blood Pressure Mother     High Cholesterol Mother     High Blood Pressure Father     Breast Cancer Sister     Hypertension Brother     Hypertension Sister     No Known Problems Brother      Past Medical History:   Diagnosis Date    Cardiomyopathy [facility])     CHF (congestive heart failure) (HCC)     CKD (chronic kidney disease)     HTN (hypertension)     Hypercholesterolemia     Medically noncompliant     Non-insulin dependent type 2 diabetes mellitus ([facility]     Obesity        Vitals:    04/05/22 1027   BP: 110/72   Pulse: 66   Temp: 97.7 °F (36.5 °C)   SpO2: 98%   Weight: 195 lb (88.5 kg)     BP Readings from Last 3 Encounters:   04/05/22 110/72   03/09/22 (!) 150/82   02/09/22 120/72    110/70    Physical Exam  Vitals and nursing note reviewed. Constitutional:       Appearance: He is well-developed. HENT:      Head: Normocephalic. Right Ear: External ear normal.      Left Ear: External ear normal.      Nose: Nose normal.   Eyes:      Conjunctiva/sclera: Conjunctivae normal.      Pupils: Pupils are equal, round, and reactive to light. Cardiovascular:      Rate and Rhythm: Normal rate. Pulmonary:      Breath sounds: Normal breath sounds. Abdominal:      General: Bowel sounds are normal.      Palpations: Abdomen is soft. Musculoskeletal:         General: Normal range of motion. Cervical back: Normal range of motion and neck supple. Skin:     General: Skin is warm and dry. Neurological:      Mental Status: He is alert and oriented to person, place, and time.    Psychiatric:         Behavior: Behavior normal.        Today's vitals physical examination overall stable. Most recent labs in February were stable. We will plan follow-up again 6 weeks and blood work at that time. Jardiance seems to be very well-tolerated will maintain present dosing. Plan Per Assessment:  Jannetta Claude was seen today for diabetes and hypertension. Diagnoses and all orders for this visit:    Type 2 diabetes mellitus with stage 3b chronic kidney disease, without long-term current use of insulin (HCC)  -     CBC with Auto Differential; Future  -     Comprehensive Metabolic Panel; Future  -     Hemoglobin A1C; Future  -     Lipid Panel; Future  -     T4; Future  -     TSH; Future    Essential hypertension  -     CBC with Auto Differential; Future  -     Comprehensive Metabolic Panel; Future  -     Hemoglobin A1C; Future  -     Lipid Panel; Future  -     T4; Future  -     TSH; Future    Stage 4 chronic kidney disease (HCC)  -     CBC with Auto Differential; Future  -     Comprehensive Metabolic Panel; Future  -     Hemoglobin A1C; Future  -     Lipid Panel; Future  -     T4; Future  -     TSH; Future    Hyperlipidemia, unspecified hyperlipidemia type  -     CBC with Auto Differential; Future  -     Comprehensive Metabolic Panel; Future  -     Hemoglobin A1C; Future  -     Lipid Panel; Future  -     T4; Future  -     TSH; Future    Bilateral hand numbness    Age-related cataract of both eyes, unspecified age-related cataract type    Other orders  -     Discontinue: allopurinol (ZYLOPRIM) 100 MG tablet; 3 qd  -     allopurinol (ZYLOPRIM) 300 MG tablet; 1 qd            Return in about 6 weeks (around 5/17/2022). Carry Skeans, DO    Note was generated with the assistance of voice recognition software. Document was reviewed however may contain grammatical errors.

## 2022-05-13 DIAGNOSIS — E78.5 HYPERLIPIDEMIA, UNSPECIFIED HYPERLIPIDEMIA TYPE: ICD-10-CM

## 2022-05-13 DIAGNOSIS — E79.0 HYPERURICEMIA: ICD-10-CM

## 2022-05-13 DIAGNOSIS — M17.0 PRIMARY OSTEOARTHRITIS OF BOTH KNEES: ICD-10-CM

## 2022-05-13 DIAGNOSIS — M25.50 ARTHRALGIA, UNSPECIFIED JOINT: ICD-10-CM

## 2022-05-13 DIAGNOSIS — N18.32 TYPE 2 DIABETES MELLITUS WITH STAGE 3B CHRONIC KIDNEY DISEASE, WITHOUT LONG-TERM CURRENT USE OF INSULIN (HCC): ICD-10-CM

## 2022-05-13 DIAGNOSIS — N18.4 STAGE 4 CHRONIC KIDNEY DISEASE (HCC): ICD-10-CM

## 2022-05-13 DIAGNOSIS — E11.22 TYPE 2 DIABETES MELLITUS WITH STAGE 3B CHRONIC KIDNEY DISEASE, WITHOUT LONG-TERM CURRENT USE OF INSULIN (HCC): ICD-10-CM

## 2022-05-13 DIAGNOSIS — I10 ESSENTIAL HYPERTENSION: ICD-10-CM

## 2022-05-13 LAB
ALBUMIN SERPL-MCNC: 4.1 G/DL (ref 3.5–5.2)
ALP BLD-CCNC: 84 U/L (ref 40–129)
ALT SERPL-CCNC: 22 U/L (ref 0–40)
ANION GAP SERPL CALCULATED.3IONS-SCNC: 15 MMOL/L (ref 7–16)
AST SERPL-CCNC: 19 U/L (ref 0–39)
BASOPHILS ABSOLUTE: 0.03 E9/L (ref 0–0.2)
BASOPHILS RELATIVE PERCENT: 0.7 % (ref 0–2)
BILIRUB SERPL-MCNC: 0.2 MG/DL (ref 0–1.2)
BUN BLDV-MCNC: 101 MG/DL (ref 6–20)
CALCIUM SERPL-MCNC: 9.7 MG/DL (ref 8.6–10.2)
CHLORIDE BLD-SCNC: 108 MMOL/L (ref 98–107)
CHOLESTEROL, TOTAL: 137 MG/DL (ref 0–199)
CO2: 14 MMOL/L (ref 22–29)
CREAT SERPL-MCNC: 3.2 MG/DL (ref 0.7–1.2)
EOSINOPHILS ABSOLUTE: 0 E9/L (ref 0.05–0.5)
EOSINOPHILS RELATIVE PERCENT: 0 % (ref 0–6)
GFR AFRICAN AMERICAN: 24
GFR NON-AFRICAN AMERICAN: 20 ML/MIN/1.73
GLUCOSE BLD-MCNC: 155 MG/DL (ref 74–99)
HBA1C MFR BLD: 7.6 % (ref 4–5.6)
HCT VFR BLD CALC: 27.7 % (ref 37–54)
HDLC SERPL-MCNC: 25 MG/DL
HEMOGLOBIN: 9 G/DL (ref 12.5–16.5)
IMMATURE GRANULOCYTES #: 0.01 E9/L
IMMATURE GRANULOCYTES %: 0.2 % (ref 0–5)
LDL CHOLESTEROL CALCULATED: 65 MG/DL (ref 0–99)
LYMPHOCYTES ABSOLUTE: 0.88 E9/L (ref 1.5–4)
LYMPHOCYTES RELATIVE PERCENT: 19.5 % (ref 20–42)
MCH RBC QN AUTO: 29.3 PG (ref 26–35)
MCHC RBC AUTO-ENTMCNC: 32.5 % (ref 32–34.5)
MCV RBC AUTO: 90.2 FL (ref 80–99.9)
MONOCYTES ABSOLUTE: 0.46 E9/L (ref 0.1–0.95)
MONOCYTES RELATIVE PERCENT: 10.2 % (ref 2–12)
NEUTROPHILS ABSOLUTE: 3.13 E9/L (ref 1.8–7.3)
NEUTROPHILS RELATIVE PERCENT: 69.4 % (ref 43–80)
PDW BLD-RTO: 13.4 FL (ref 11.5–15)
PLATELET # BLD: 131 E9/L (ref 130–450)
PMV BLD AUTO: 10.5 FL (ref 7–12)
POTASSIUM SERPL-SCNC: 5.3 MMOL/L (ref 3.5–5)
RBC # BLD: 3.07 E12/L (ref 3.8–5.8)
RHEUMATOID FACTOR: <10 IU/ML (ref 0–13)
SODIUM BLD-SCNC: 137 MMOL/L (ref 132–146)
T4 TOTAL: 5.9 MCG/DL (ref 4.5–11.7)
TOTAL PROTEIN: 7.3 G/DL (ref 6.4–8.3)
TRIGL SERPL-MCNC: 235 MG/DL (ref 0–149)
TSH SERPL DL<=0.05 MIU/L-ACNC: 0.46 UIU/ML (ref 0.27–4.2)
URIC ACID, SERUM: 5.2 MG/DL (ref 3.4–7)
VLDLC SERPL CALC-MCNC: 47 MG/DL
WBC # BLD: 4.5 E9/L (ref 4.5–11.5)

## 2022-05-16 LAB — ANTI-NUCLEAR ANTIBODY (ANA): NEGATIVE

## 2022-05-17 ENCOUNTER — OFFICE VISIT (OUTPATIENT)
Dept: PRIMARY CARE CLINIC | Age: 57
End: 2022-05-17
Payer: COMMERCIAL

## 2022-05-17 VITALS
HEART RATE: 68 BPM | TEMPERATURE: 98.3 F | OXYGEN SATURATION: 98 % | DIASTOLIC BLOOD PRESSURE: 72 MMHG | SYSTOLIC BLOOD PRESSURE: 128 MMHG

## 2022-05-17 DIAGNOSIS — N18.32 TYPE 2 DIABETES MELLITUS WITH STAGE 3B CHRONIC KIDNEY DISEASE, WITHOUT LONG-TERM CURRENT USE OF INSULIN (HCC): ICD-10-CM

## 2022-05-17 DIAGNOSIS — I10 ESSENTIAL HYPERTENSION: ICD-10-CM

## 2022-05-17 DIAGNOSIS — I42.8 NONISCHEMIC CARDIOMYOPATHY (HCC): Primary | ICD-10-CM

## 2022-05-17 DIAGNOSIS — N18.4 STAGE 4 CHRONIC KIDNEY DISEASE (HCC): ICD-10-CM

## 2022-05-17 DIAGNOSIS — E11.22 TYPE 2 DIABETES MELLITUS WITH STAGE 3B CHRONIC KIDNEY DISEASE, WITHOUT LONG-TERM CURRENT USE OF INSULIN (HCC): ICD-10-CM

## 2022-05-17 PROCEDURE — 3051F HG A1C>EQUAL 7.0%<8.0%: CPT | Performed by: FAMILY MEDICINE

## 2022-05-17 PROCEDURE — 99214 OFFICE O/P EST MOD 30 MIN: CPT | Performed by: FAMILY MEDICINE

## 2022-05-17 ASSESSMENT — ENCOUNTER SYMPTOMS
EYES NEGATIVE: 1
RESPIRATORY NEGATIVE: 1
ALLERGIC/IMMUNOLOGIC NEGATIVE: 1
GASTROINTESTINAL NEGATIVE: 1

## 2022-05-17 NOTE — PROGRESS NOTES
.22     Kendal Jeter    : 1965 Sex: male   Age: 55 y.o. Chief Complaint   Patient presents with    Discuss Labs    Diabetes    Hypertension     BP has been running low, stopped nifedipine and benazepril last week       Prior to Admission medications    Medication Sig Start Date End Date Taking? Authorizing Provider   allopurinol (ZYLOPRIM) 300 MG tablet 1 qd 22  Yes Dariel Franco DO   empagliflozin (JARDIANCE) 10 MG tablet Take 1 tablet by mouth daily 3/9/22  Yes Dariel Franco DO   atorvastatin (LIPITOR) 40 MG tablet Take 1 tablet by mouth nightly 22  Yes Dariel Franco DO   furosemide (LASIX) 40 MG tablet Take 1 tablet by mouth 2 times daily 22  Yes Dariel Franco DO   Cholecalciferol (VITAMIN D3) 50 MCG ( UT) CAPS Take by mouth   Yes Historical Provider, MD   metoprolol succinate (TOPROL XL) 50 MG extended release tablet Take 1 tablet by mouth 2 times daily  Patient taking differently: Take 50 mg by mouth daily  11/10/21  Yes Dariel Franco DO   glipiZIDE (GLUCOTROL XL) 5 MG extended release tablet 2 by mouth bid  Patient taking differently: 2 pills daily 9/3/21  Yes Dariel Franco DO   isosorbide dinitrate (ISORDIL) 20 MG tablet 2 tablets 3 times a day  Patient taking differently: 2 tablets 2 times a day 9/3/21  Yes Dariel Franco DO   ONE TOUCH LANCETS MISC Test one time daily. Uses mini glucometer   Yes Historical Provider, MD   blood glucose test strips (ASCENSIA AUTODISC VI;ONE TOUCH ULTRA TEST VI) strip Test one time daily.   Uses One Touch ultra   Yes Historical Provider, MD   NIFEdipine (ADALAT CC) 30 MG extended release tablet Take 1 tablet by mouth daily  Patient not taking: Reported on 2022   Dariel Franco DO   benazepril-hydrochlorthiazide (LOTENSIN HCT) 20-12.5 MG per tablet 1am 1pm  Patient not taking: Reported on 2022 9/3/21   Juanita Lobo DO          HPI: Logan is seen today in follow-up on cardiomyopathy diabetes hypertension chronic kidney disease he was having problems with hypotensive complaints on his medication. Recommendations to back off on Lotensin hydrochlorothiazide to half a tablet daily have been made. Nifedipine is placed on hold. Isosorbide was dropped to twice a day. Metoprolol currently taking once a day. Lasix reduced to once a day. Reassessment with next visit. Repeat CMP and CBC. Review of Systems   Constitutional: Negative. HENT: Negative. Eyes: Negative. Respiratory: Negative. Gastrointestinal: Negative. Endocrine: Negative. Genitourinary: Negative. Musculoskeletal: Negative. Skin: Negative. Allergic/Immunologic: Negative. Neurological: Negative. Hematological: Negative. Psychiatric/Behavioral: Negative. Today systems review overall stable aside from HPI complaints. Sugars have been stable and we will maintain glipizide to just 2 tablets daily in the morning and reassess with next follow-up.     Current Outpatient Medications:     allopurinol (ZYLOPRIM) 300 MG tablet, 1 qd, Disp: 90 tablet, Rfl: 2    empagliflozin (JARDIANCE) 10 MG tablet, Take 1 tablet by mouth daily, Disp: 30 tablet, Rfl: 5    atorvastatin (LIPITOR) 40 MG tablet, Take 1 tablet by mouth nightly, Disp: 90 tablet, Rfl: 3    furosemide (LASIX) 40 MG tablet, Take 1 tablet by mouth 2 times daily, Disp: 60 tablet, Rfl: 5    Cholecalciferol (VITAMIN D3) 50 MCG (2000 UT) CAPS, Take by mouth, Disp: , Rfl:     metoprolol succinate (TOPROL XL) 50 MG extended release tablet, Take 1 tablet by mouth 2 times daily (Patient taking differently: Take 50 mg by mouth daily ), Disp: 180 tablet, Rfl: 3    glipiZIDE (GLUCOTROL XL) 5 MG extended release tablet, 2 by mouth bid (Patient taking differently: 2 pills daily), Disp: 360 tablet, Rfl: 3    isosorbide dinitrate (ISORDIL) 20 MG tablet, 2 tablets 3 times a day (Patient taking differently: 2 tablets 2 times a day), Disp: 540 tablet, Rfl: 3    ONE TOUCH LANCETS MISC, Test one time daily. Uses mini glucometer, Disp: , Rfl:     blood glucose test strips (ASCENSIA AUTODISC VI;ONE TOUCH ULTRA TEST VI) strip, Test one time daily. Uses One Touch ultra, Disp: , Rfl:     NIFEdipine (ADALAT CC) 30 MG extended release tablet, Take 1 tablet by mouth daily (Patient not taking: Reported on 5/17/2022), Disp: 30 tablet, Rfl: 5    benazepril-hydrochlorthiazide (LOTENSIN HCT) 20-12.5 MG per tablet, 1am 1pm (Patient not taking: Reported on 5/17/2022), Disp: 60 tablet, Rfl: 3    No Known Allergies    Social History     Tobacco Use    Smoking status: Never Smoker    Smokeless tobacco: Never Used   Substance Use Topics    Alcohol use: Yes     Alcohol/week: 2.0 standard drinks     Types: 2 Cans of beer per week    Drug use: No      Past Surgical History:   Procedure Laterality Date    CT BIOPSY RENAL  11/2/2021    CT BIOPSY RENAL 11/2/2021 Jackson Fuentes MD [facility]    TONSILLECTOMY      VITRECTOMY Right 8/16/2021    [facility] VITRECTOMY PAN RETINAL PHOTOAGULATION LASER GAS FLUID EXCHANGE RIGHT EYE performed by Paula Oconnor MD at [address] [place] [address]     Family History   Problem Relation Age of Onset    High Blood Pressure Mother     High Cholesterol Mother     High Blood Pressure Father     Breast Cancer Sister     Hypertension Brother     Hypertension Sister     No Known Problems Brother      Past Medical History:   Diagnosis Date    [facility])     CHF (congestive heart failure) (HCC)     CKD (chronic kidney disease)     HTN (hypertension)     Hypercholesterolemia     Medically noncompliant     Non-insulin dependent type 2 diabetes mellitus ([facility]     Obesity        Vitals:    05/17/22 1307   BP: 128/72   Pulse: 68   Temp: 98.3 °F (36.8 °C)   SpO2: 98%     BP Readings from Last 3 Encounters:   05/17/22 128/72   04/05/22 110/72   03/09/22 (!) 150/82        Physical Exam  Vitals and nursing note reviewed. Constitutional:       Appearance: He is well-developed. HENT:      Head: Normocephalic. Right Ear: External ear normal.      Left Ear: External ear normal.      Nose: Nose normal.   Eyes:      Conjunctiva/sclera: Conjunctivae normal.      Pupils: Pupils are equal, round, and reactive to light. Cardiovascular:      Rate and Rhythm: Normal rate. Pulmonary:      Breath sounds: Normal breath sounds. Abdominal:      General: Bowel sounds are normal.      Palpations: Abdomen is soft. Musculoskeletal:         General: Normal range of motion. Cervical back: Normal range of motion and neck supple. Skin:     General: Skin is warm and dry. Neurological:      Mental Status: He is alert and oriented to person, place, and time. Psychiatric:         Behavior: Behavior normal.     Vitals and physical examination overall stable. Medications as prescribed. Reassessment 2 weeks and sooner if problems. Plan Per Assessment:  Aden Pete was seen today for discuss labs, diabetes and hypertension. Diagnoses and all orders for this visit:    Nonischemic cardiomyopathy ([facility]    Essential hypertension  -     CBC with Auto Differential; Future  -     Comprehensive Metabolic Panel; Future    Type 2 diabetes mellitus with stage 3b chronic kidney disease, without long-term current use of insulin (HCC)  -     CBC with Auto Differential; Future  -     Comprehensive Metabolic Panel; Future    Stage 4 chronic kidney disease (HCC)  -     CBC with Auto Differential; Future  -     Comprehensive Metabolic Panel; Future            Return in about 2 weeks (around 5/31/2022). Ed Stevens [id number], DO    Note was generated with the assistance of voice recognition software. Document was reviewed however may contain grammatical errors.

## 2022-05-27 DIAGNOSIS — E11.22 TYPE 2 DIABETES MELLITUS WITH STAGE 3B CHRONIC KIDNEY DISEASE, WITHOUT LONG-TERM CURRENT USE OF INSULIN (HCC): ICD-10-CM

## 2022-05-27 DIAGNOSIS — N18.4 STAGE 4 CHRONIC KIDNEY DISEASE (HCC): ICD-10-CM

## 2022-05-27 DIAGNOSIS — N18.32 TYPE 2 DIABETES MELLITUS WITH STAGE 3B CHRONIC KIDNEY DISEASE, WITHOUT LONG-TERM CURRENT USE OF INSULIN (HCC): ICD-10-CM

## 2022-05-27 DIAGNOSIS — I10 ESSENTIAL HYPERTENSION: ICD-10-CM

## 2022-05-27 LAB
ALBUMIN SERPL-MCNC: 3.8 G/DL (ref 3.5–5.2)
ALP BLD-CCNC: 90 U/L (ref 40–129)
ALT SERPL-CCNC: 18 U/L (ref 0–40)
ANION GAP SERPL CALCULATED.3IONS-SCNC: 14 MMOL/L (ref 7–16)
AST SERPL-CCNC: 21 U/L (ref 0–39)
BASOPHILS ABSOLUTE: 0.02 E9/L (ref 0–0.2)
BASOPHILS RELATIVE PERCENT: 0.5 % (ref 0–2)
BILIRUB SERPL-MCNC: 0.3 MG/DL (ref 0–1.2)
BUN BLDV-MCNC: 58 MG/DL (ref 6–20)
CALCIUM SERPL-MCNC: 9.1 MG/DL (ref 8.6–10.2)
CHLORIDE BLD-SCNC: 106 MMOL/L (ref 98–107)
CO2: 19 MMOL/L (ref 22–29)
CREAT SERPL-MCNC: 2.6 MG/DL (ref 0.7–1.2)
EOSINOPHILS ABSOLUTE: 0 E9/L (ref 0.05–0.5)
EOSINOPHILS RELATIVE PERCENT: 0 % (ref 0–6)
GFR AFRICAN AMERICAN: 31
GFR NON-AFRICAN AMERICAN: 26 ML/MIN/1.73
GLUCOSE BLD-MCNC: 71 MG/DL (ref 74–99)
HCT VFR BLD CALC: 26.1 % (ref 37–54)
HEMOGLOBIN: 8.3 G/DL (ref 12.5–16.5)
IMMATURE GRANULOCYTES #: 0.02 E9/L
IMMATURE GRANULOCYTES %: 0.5 % (ref 0–5)
LYMPHOCYTES ABSOLUTE: 0.82 E9/L (ref 1.5–4)
LYMPHOCYTES RELATIVE PERCENT: 18.7 % (ref 20–42)
MCH RBC QN AUTO: 29.2 PG (ref 26–35)
MCHC RBC AUTO-ENTMCNC: 31.8 % (ref 32–34.5)
MCV RBC AUTO: 91.9 FL (ref 80–99.9)
MONOCYTES ABSOLUTE: 0.41 E9/L (ref 0.1–0.95)
MONOCYTES RELATIVE PERCENT: 9.3 % (ref 2–12)
NEUTROPHILS ABSOLUTE: 3.12 E9/L (ref 1.8–7.3)
NEUTROPHILS RELATIVE PERCENT: 71 % (ref 43–80)
PDW BLD-RTO: 13.8 FL (ref 11.5–15)
PLATELET # BLD: 134 E9/L (ref 130–450)
PMV BLD AUTO: 10.4 FL (ref 7–12)
POTASSIUM SERPL-SCNC: 4.5 MMOL/L (ref 3.5–5)
RBC # BLD: 2.84 E12/L (ref 3.8–5.8)
SODIUM BLD-SCNC: 139 MMOL/L (ref 132–146)
TOTAL PROTEIN: 7 G/DL (ref 6.4–8.3)
WBC # BLD: 4.4 E9/L (ref 4.5–11.5)

## 2022-05-31 ENCOUNTER — OFFICE VISIT (OUTPATIENT)
Dept: PRIMARY CARE CLINIC | Age: 57
End: 2022-05-31
Payer: COMMERCIAL

## 2022-05-31 VITALS
BODY MASS INDEX: 32.96 KG/M2 | TEMPERATURE: 98.6 F | DIASTOLIC BLOOD PRESSURE: 66 MMHG | SYSTOLIC BLOOD PRESSURE: 136 MMHG | OXYGEN SATURATION: 97 % | WEIGHT: 192 LBS | HEART RATE: 67 BPM

## 2022-05-31 DIAGNOSIS — E11.22 TYPE 2 DIABETES MELLITUS WITH STAGE 3B CHRONIC KIDNEY DISEASE, WITHOUT LONG-TERM CURRENT USE OF INSULIN (HCC): ICD-10-CM

## 2022-05-31 DIAGNOSIS — N18.32 TYPE 2 DIABETES MELLITUS WITH STAGE 3B CHRONIC KIDNEY DISEASE, WITHOUT LONG-TERM CURRENT USE OF INSULIN (HCC): ICD-10-CM

## 2022-05-31 DIAGNOSIS — I10 ESSENTIAL HYPERTENSION: ICD-10-CM

## 2022-05-31 DIAGNOSIS — D50.9 IRON DEFICIENCY ANEMIA, UNSPECIFIED IRON DEFICIENCY ANEMIA TYPE: ICD-10-CM

## 2022-05-31 DIAGNOSIS — H26.9 CATARACT OF BOTH EYES, UNSPECIFIED CATARACT TYPE: Primary | ICD-10-CM

## 2022-05-31 DIAGNOSIS — Z12.11 COLON CANCER SCREENING: ICD-10-CM

## 2022-05-31 PROCEDURE — 3051F HG A1C>EQUAL 7.0%<8.0%: CPT | Performed by: FAMILY MEDICINE

## 2022-05-31 PROCEDURE — 99214 OFFICE O/P EST MOD 30 MIN: CPT | Performed by: FAMILY MEDICINE

## 2022-05-31 RX ORDER — PREDNISOLONE ACETATE 10 MG/ML
SUSPENSION/ DROPS OPHTHALMIC
COMMUNITY
Start: 2022-05-27 | End: 2022-07-27 | Stop reason: CLARIF

## 2022-05-31 RX ORDER — OFLOXACIN 3 MG/ML
SOLUTION/ DROPS OPHTHALMIC
COMMUNITY
Start: 2022-05-27 | End: 2022-07-27 | Stop reason: CLARIF

## 2022-05-31 RX ORDER — KETOROLAC TROMETHAMINE 5 MG/ML
SOLUTION OPHTHALMIC
COMMUNITY
Start: 2022-05-27 | End: 2022-07-27 | Stop reason: CLARIF

## 2022-05-31 NOTE — PROGRESS NOTES
22     Valeriy Hoang    : 1965 Sex: male   Age: 55 y.o. Chief Complaint   Patient presents with   [facility]       Prior to Admission medications    Medication Sig Start Date End Date Taking? Authorizing Provider   ketorolac (ACULAR) 0.5 % ophthalmic solution  22  Yes Historical Provider, MD   ofloxacin (OCUFLOX) 0.3 % solution  22  Yes Historical Provider, MD   prednisoLONE acetate (PRED FORTE) 1 % ophthalmic suspension  22  Yes Historical Provider, MD   allopurinol (ZYLOPRIM) 300 MG tablet 1 qd 22  Yes Gillian Franco DO   empagliflozin (JARDIANCE) 10 MG tablet Take 1 tablet by mouth daily 3/9/22  Yes Gillian Franco DO   atorvastatin (LIPITOR) 40 MG tablet Take 1 tablet by mouth nightly 22  Yes Gillian Franco DO   furosemide (LASIX) 40 MG tablet Take 1 tablet by mouth 2 times daily 22  Yes Gillian Franco DO   Cholecalciferol (VITAMIN D3) 50 MCG (2000) CAPS Take by mouth   Yes Historical Provider, MD   metoprolol succinate (TOPROL XL) 50 MG extended release tablet Take 1 tablet by mouth 2 times daily  Patient taking differently: Take 50 mg by mouth daily  11/10/21  Yes Gillian Franco DO   glipiZIDE (GLUCOTROL XL) 5 MG extended release tablet 2 by mouth bid  Patient taking differently: 2 pills daily 9/3/21  Yes Gillian Franco DO   isosorbide dinitrate (ISORDIL) 20 MG tablet 2 tablets 3 times a day  Patient taking differently: 2 tablets 2 times a day 9/3/21  Yes Kit Franco, DO   benazepril-hydrochlorthiazide (LOTENSIN HCT) 20-12.5 MG per tablet 1am 1pm  Patient taking differently: daily  9/3/21  Yes Gillian Franco DO   ONE TOUCH LANCETS MISC Test one time daily. Uses mini glucometer   Yes Historical Provider, MD   blood glucose test strips (ASCENSIA AUTODISC VI;ONE TOUCH ULTRA TEST VI) strip Test one time daily.   Uses One Touch ultra   Yes Historical Provider, MD   NIFEdipine (ADALAT CC) 30 MG extended release tablet Take 1 tablet by mouth daily  Patient not taking: Reported on 5/31/2022 2/9/22   Sven Bingham DO          HPI: Patient evaluated today recent cataract surgery on the right doing well and will plan left eye as well. Hypertension diabetes controlled medications currently tolerated. Chronic kidney disease following with Dr. Sukhjinder Vanegas and stable on present treatment. Anemia is present and etiology uncertain we are going to refer him for GI evaluation possible colonoscopy which has not been completed to date. Review of Systems   Constitutional: Negative. HENT: Negative. Eyes: Negative. Respiratory: Negative. Gastrointestinal: Negative. Endocrine: Negative. Genitourinary: Negative. Musculoskeletal: Negative. Skin: Negative. Allergic/Immunologic: Negative. Neurological: Negative. Hematological: Negative. Psychiatric/Behavioral: Negative. systems review overall stable seems to be tolerating present meds.           Current Outpatient Medications:     ketorolac (ACULAR) 0.5 % ophthalmic solution, , Disp: , Rfl:     ofloxacin (OCUFLOX) 0.3 % solution, , Disp: , Rfl:     prednisoLONE acetate (PRED FORTE) 1 % ophthalmic suspension, , Disp: , Rfl:     allopurinol (ZYLOPRIM) 300 MG tablet, 1 qd, Disp: 90 tablet, Rfl: 2    empagliflozin (JARDIANCE) 10 MG tablet, Take 1 tablet by mouth daily, Disp: 30 tablet, Rfl: 5    atorvastatin (LIPITOR) 40 MG tablet, Take 1 tablet by mouth nightly, Disp: 90 tablet, Rfl: 3    furosemide (LASIX) 40 MG tablet, Take 1 tablet by mouth 2 times daily, Disp: 60 tablet, Rfl: 5    Cholecalciferol (VITAMIN D3) 50 MCG (2000 UT) CAPS, Take by mouth, Disp: , Rfl:     metoprolol succinate (TOPROL XL) 50 MG extended release tablet, Take 1 tablet by mouth 2 times daily (Patient taking differently: Take 50 mg by mouth daily ), Disp: 180 tablet, Rfl: 3    glipiZIDE (GLUCOTROL XL) 5 MG extended release tablet, 2 by mouth bid (Patient taking differently: 2 pills daily), Disp: 360 tablet, Rfl: 3    isosorbide dinitrate (ISORDIL) 20 MG tablet, 2 tablets 3 times a day (Patient taking differently: 2 tablets 2 times a day), Disp: 540 tablet, Rfl: 3    benazepril-hydrochlorthiazide (LOTENSIN HCT) 20-12.5 MG per tablet, 1am 1pm (Patient taking differently: daily ), Disp: 60 tablet, Rfl: 3    ONE TOUCH LANCETS MISC, Test one time daily. Uses mini glucometer, Disp: , Rfl:     blood glucose test strips (ASCENSIA AUTODISC VI;ONE TOUCH ULTRA TEST VI) strip, Test one time daily. Uses One Touch ultra, Disp: , Rfl:     NIFEdipine (ADALAT CC) 30 MG extended release tablet, Take 1 tablet by mouth daily (Patient not taking: Reported on 5/31/2022), Disp: 30 tablet, Rfl: 5    No Known Allergies    Social History     Tobacco Use    Smoking status: Never Smoker    Smokeless tobacco: Never Used   Substance Use Topics    Alcohol use:  Yes     Alcohol/week: 2.0 standard drinks     Types: 2 Cans of beer per week    Drug use: No      Past Surgical History:   Procedure Laterality Date    CT BIOPSY RENAL  11/2/2021    CT BIOPSY RENAL 11/2/2021 Shwetha Anne MD [facility]    TONSILLECTOMY      VITRECTOMY Right 8/16/2021    PARS PLANA VITRECTOMY PAN RETINAL PHOTOAGULATION LASER GAS FLUID EXCHANGE RIGHT EYE performed by Georgette Shah MD at [address] [place] [address]     Family History   Problem Relation Age of Onset    High Blood Pressure Mother     High Cholesterol Mother     High Blood Pressure Father     Breast Cancer Sister     Hypertension Brother     Hypertension Sister     No Known Problems Brother      Past Medical History:   Diagnosis Date    [facility]omyopathy [facility])     CHF (congestive heart failure) (HCC)     CKD (chronic kidney disease)     HTN (hypertension)     Hypercholesterolemia     Medically noncompliant     Non-insulin dependent type 2 diabetes mellitus ([facility] [address]     Obesity        Vitals:    05/31/22 1424   BP: 136/66   Pulse: 67   Temp: 98.6 °F (37 °C)   SpO2: 97%   Weight: 192 lb (87.1 kg)     BP Readings from Last 3 Encounters:   05/31/22 136/66   05/17/22 128/72   04/05/22 110/72        Physical Exam  Vitals and nursing note reviewed. Constitutional:       Appearance: He is well-developed. HENT:      Head: Normocephalic. Right Ear: External ear normal.      Left Ear: External ear normal.      Nose: Nose normal.   Eyes:      Conjunctiva/sclera: Conjunctivae normal.      Pupils: Pupils are equal, round, and reactive to light. Cardiovascular:      Rate and Rhythm: Normal rate. Pulmonary:      Breath sounds: Normal breath sounds. Abdominal:      General: Bowel sounds are normal.      Palpations: Abdomen is soft. Musculoskeletal:         General: Normal range of motion. Cervical back: Normal range of motion and neck supple. Skin:     General: Skin is warm and dry. Neurological:      Mental Status: He is alert and oriented to person, place, and time. Psychiatric:         Behavior: Behavior normal.        Present vitals physical examination stable. I will sit tight with current meds and care. Anemia as noted and further GI work-up. Follow-up with nephrology and see me back in a month with repeat labs.   Lab Results   Component Value Date    TSH 0.463 05/13/2022    TSH 0.509 02/07/2022    X5QMBVI 5.9 05/13/2022    J6HJLCW 6.4 02/07/2022     Lab Results   Component Value Date    CHOL 137 05/13/2022    CHOL 129 02/07/2022     Lab Results   Component Value Date    TRIG 235 (H) 05/13/2022    TRIG 252 (H) 02/07/2022     Lab Results   Component Value Date    HDL 25 05/13/2022    HDL 25 02/07/2022     No results found for: [facility]  Lab Results   Component Value Date    LABVLDL 47 05/13/2022    LABVLDL 50 02/07/2022     No results found for: [facility]  Lab Results   Component Value Date    WBC 4.4 (L) 05/27/2022    HGB 8.3 (L) 05/27/2022    HCT 26.1 (L) 05/27/2022    MCV 91.9 05/27/2022     05/27/2022    LYMPHOPCT 18.7 (L) 05/27/2022    RBC 2.84 (L) 05/27/2022    MCH 29.2 05/27/2022    MCHC 31.8 (L) 05/27/2022    RDW 13.8 05/27/2022     Lab Results   Component Value Date     05/27/2022    K 4.5 05/27/2022     05/27/2022    CO2 19 (L) 05/27/2022    BUN 58 (H) 05/27/2022    CREATININE 2.6 (H) 05/27/2022    GLUCOSE 71 (L) 05/27/2022    CALCIUM 9.1 05/27/2022    PROT 7.0 05/27/2022    LABALBU 3.8 05/27/2022    BILITOT 0.3 05/27/2022    ALKPHOS 90 05/27/2022    AST 21 05/27/2022    ALT 18 05/27/2022    LABGLOM 26 05/27/2022    GFRAA 31 05/27/2022      No results found for: PSA, PSADIA   Lab Results   Component Value Date    LABA1C 7.6 (H) 05/13/2022    LABA1C 6.8 (H) 02/07/2022    LABA1C 6.8 (H) 08/05/2021     No results found for: EAG         Plan Per Assessment:  There are no diagnoses linked to this encounter. No follow-ups on file. Bryan Valentine DO    Note was generated with the assistance of voice recognition software. Document was reviewed however may contain grammatical errors.

## 2022-06-30 PROBLEM — Z12.11 COLON CANCER SCREENING: Status: RESOLVED | Noted: 2022-05-31 | Resolved: 2022-06-30

## 2022-07-07 LAB — DIABETIC RETINOPATHY: POSITIVE

## 2022-07-26 DIAGNOSIS — E11.22 TYPE 2 DIABETES MELLITUS WITH STAGE 3B CHRONIC KIDNEY DISEASE, WITHOUT LONG-TERM CURRENT USE OF INSULIN (HCC): ICD-10-CM

## 2022-07-26 DIAGNOSIS — I10 ESSENTIAL HYPERTENSION: ICD-10-CM

## 2022-07-26 DIAGNOSIS — D50.9 IRON DEFICIENCY ANEMIA, UNSPECIFIED IRON DEFICIENCY ANEMIA TYPE: ICD-10-CM

## 2022-07-26 DIAGNOSIS — N18.32 TYPE 2 DIABETES MELLITUS WITH STAGE 3B CHRONIC KIDNEY DISEASE, WITHOUT LONG-TERM CURRENT USE OF INSULIN (HCC): ICD-10-CM

## 2022-07-26 LAB
ALBUMIN SERPL-MCNC: 3.8 G/DL (ref 3.5–5.2)
ALP BLD-CCNC: 113 U/L (ref 40–129)
ALT SERPL-CCNC: 14 U/L (ref 0–40)
ANION GAP SERPL CALCULATED.3IONS-SCNC: 16 MMOL/L (ref 7–16)
AST SERPL-CCNC: 15 U/L (ref 0–39)
BASOPHILS ABSOLUTE: 0.03 E9/L (ref 0–0.2)
BASOPHILS RELATIVE PERCENT: 0.5 % (ref 0–2)
BILIRUB SERPL-MCNC: 0.3 MG/DL (ref 0–1.2)
BUN BLDV-MCNC: 61 MG/DL (ref 6–20)
CALCIUM SERPL-MCNC: 9 MG/DL (ref 8.6–10.2)
CHLORIDE BLD-SCNC: 109 MMOL/L (ref 98–107)
CO2: 20 MMOL/L (ref 22–29)
CREAT SERPL-MCNC: 2.5 MG/DL (ref 0.7–1.2)
EOSINOPHILS ABSOLUTE: 0.23 E9/L (ref 0.05–0.5)
EOSINOPHILS RELATIVE PERCENT: 3.9 % (ref 0–6)
GFR AFRICAN AMERICAN: 33
GFR NON-AFRICAN AMERICAN: 27 ML/MIN/1.73
GLUCOSE BLD-MCNC: 95 MG/DL (ref 74–99)
HBA1C MFR BLD: 6.6 % (ref 4–5.6)
HCT VFR BLD CALC: 26.7 % (ref 37–54)
HEMOGLOBIN: 8.5 G/DL (ref 12.5–16.5)
IMMATURE GRANULOCYTES #: 0.02 E9/L
IMMATURE GRANULOCYTES %: 0.3 % (ref 0–5)
LYMPHOCYTES ABSOLUTE: 1.1 E9/L (ref 1.5–4)
LYMPHOCYTES RELATIVE PERCENT: 18.4 % (ref 20–42)
MCH RBC QN AUTO: 29.2 PG (ref 26–35)
MCHC RBC AUTO-ENTMCNC: 31.8 % (ref 32–34.5)
MCV RBC AUTO: 91.8 FL (ref 80–99.9)
MONOCYTES ABSOLUTE: 0.56 E9/L (ref 0.1–0.95)
MONOCYTES RELATIVE PERCENT: 9.4 % (ref 2–12)
NEUTROPHILS ABSOLUTE: 4.03 E9/L (ref 1.8–7.3)
NEUTROPHILS RELATIVE PERCENT: 67.5 % (ref 43–80)
PDW BLD-RTO: 13.5 FL (ref 11.5–15)
PLATELET # BLD: 156 E9/L (ref 130–450)
PMV BLD AUTO: 10.6 FL (ref 7–12)
POTASSIUM SERPL-SCNC: 4.2 MMOL/L (ref 3.5–5)
RBC # BLD: 2.91 E12/L (ref 3.8–5.8)
SODIUM BLD-SCNC: 145 MMOL/L (ref 132–146)
TOTAL PROTEIN: 6.9 G/DL (ref 6.4–8.3)
WBC # BLD: 6 E9/L (ref 4.5–11.5)

## 2022-07-27 ENCOUNTER — OFFICE VISIT (OUTPATIENT)
Dept: PRIMARY CARE CLINIC | Age: 57
End: 2022-07-27
Payer: COMMERCIAL

## 2022-07-27 VITALS
WEIGHT: 191 LBS | SYSTOLIC BLOOD PRESSURE: 136 MMHG | BODY MASS INDEX: 32.79 KG/M2 | OXYGEN SATURATION: 98 % | DIASTOLIC BLOOD PRESSURE: 78 MMHG | HEART RATE: 61 BPM

## 2022-07-27 DIAGNOSIS — E11.22 TYPE 2 DIABETES MELLITUS WITH STAGE 3B CHRONIC KIDNEY DISEASE, WITHOUT LONG-TERM CURRENT USE OF INSULIN (HCC): ICD-10-CM

## 2022-07-27 DIAGNOSIS — N18.4 STAGE 4 CHRONIC KIDNEY DISEASE (HCC): ICD-10-CM

## 2022-07-27 DIAGNOSIS — N18.32 TYPE 2 DIABETES MELLITUS WITH STAGE 3B CHRONIC KIDNEY DISEASE, WITHOUT LONG-TERM CURRENT USE OF INSULIN (HCC): ICD-10-CM

## 2022-07-27 DIAGNOSIS — I10 ESSENTIAL HYPERTENSION: Primary | ICD-10-CM

## 2022-07-27 DIAGNOSIS — E78.5 HYPERLIPIDEMIA, UNSPECIFIED HYPERLIPIDEMIA TYPE: ICD-10-CM

## 2022-07-27 PROCEDURE — 3044F HG A1C LEVEL LT 7.0%: CPT | Performed by: FAMILY MEDICINE

## 2022-07-27 PROCEDURE — 99214 OFFICE O/P EST MOD 30 MIN: CPT | Performed by: FAMILY MEDICINE

## 2022-07-27 RX ORDER — FUROSEMIDE 40 MG/1
40 TABLET ORAL DAILY
Qty: 30 TABLET | Refills: 5 | Status: SHIPPED
Start: 2022-07-27 | End: 2022-10-31 | Stop reason: SDUPTHER

## 2022-07-27 NOTE — PROGRESS NOTES
22     Hernan Wang    : 1965 Sex: male   Age: 55 y.o. Chief Complaint   Patient presents with    Discuss Labs    Depression    Diabetes       Prior to Admission medications    Medication Sig Start Date End Date Taking? Authorizing Provider   furosemide (LASIX) 40 MG tablet Take 1 tablet by mouth in the morning. 22  Yes Edgar Franco DO   allopurinol (ZYLOPRIM) 300 MG tablet 1 qd 22  Yes Edgar Franco DO   empagliflozin (JARDIANCE) 10 MG tablet Take 1 tablet by mouth daily 3/9/22  Yes Edgar Franco DO   atorvastatin (LIPITOR) 40 MG tablet Take 1 tablet by mouth nightly 22  Yes Edgar Franco DO   Cholecalciferol (VITAMIN D3) 50 MCG (2000) CAPS Take by mouth   Yes Historical Provider, MD   metoprolol succinate (TOPROL XL) 50 MG extended release tablet Take 1 tablet by mouth 2 times daily  Patient taking differently: Take 50 mg by mouth in the morning. 11/10/21  Yes Edgar Franco DO   glipiZIDE (GLUCOTROL XL) 5 MG extended release tablet 2 by mouth bid  Patient taking differently: 2 pills daily 9/3/21  Yes Edgar Franco DO   isosorbide dinitrate (ISORDIL) 20 MG tablet 2 tablets 3 times a day  Patient taking differently: 2 tablets 2 times a day 9/3/21  Yes Kit Franco, DO   benazepril-hydrochlorthiazide (LOTENSIN HCT) 20-12.5 MG per tablet 1am 1pm  Patient taking differently: daily 9/3/21  Yes Edgar Franco DO   ONE TOUCH LANCETS MISC Test one time daily. Uses mini glucometer   Yes Historical Provider, MD   blood glucose test strips (ASCENSIA AUTODISC VI;ONE TOUCH ULTRA TEST VI) strip Test one time daily. Uses One Touch ultra   Yes Historical Provider, MD          HPI: Evaluated today with hypertension hyperlipidemia chronic kidney disease diabetes mellitus all of which is currently well controlled. Medications reviewed maintain as prescribed. A1c currently 6.6 and doing well. Pressures well controlled today.   Chronic kidney 2.0 standard drinks     Types: 2 Cans of beer per week    Drug use: No      Past Surgical History:   Procedure Laterality Date    CT BIOPSY RENAL  11/2/2021    CT BIOPSY RENAL 11/2/2021 Corey Tellez MD [facility]    TONSILLECTOMY      VITRECTOMY Right 8/16/2021    PARS PLANA VITRECTOMY PAN RETINAL PHOTOAGULATION LASER GAS FLUID EXCHANGE RIGHT EYE performed by Eugene Blanca MD at [address] [place] [address]     Family History   Problem Relation Age of Onset    High Blood Pressure Mother     High Cholesterol Mother     High Blood Pressure Father     Breast Cancer Sister     Hypertension Brother     Hypertension Sister     No Known Problems Brother      Past Medical History:   Diagnosis Date    Cardiomyopathy ([facility] [address]     CHF (congestive heart failure) ([facility] [address]     CKD (chronic kidney disease)     HTN (hypertension)     Hypercholesterolemia     Medically noncompliant     Non-insulin dependent type 2 diabetes mellitus ([facility] [address]     Obesity        Vitals:    07/27/22 1115   BP: 136/78   Pulse: 61   SpO2: 98%   Weight: 191 lb (86.6 kg)     BP Readings from Last 3 Encounters:   07/27/22 136/78   05/31/22 136/66   05/17/22 128/72    142/80    Physical Exam  Vitals and nursing note reviewed. Constitutional:       Appearance: He is well-developed. HENT:      Head: Normocephalic. Right Ear: External ear normal.      Left Ear: External ear normal.      Nose: Nose normal.   Eyes:      Conjunctiva/sclera: Conjunctivae normal.      Pupils: Pupils are equal, round, and reactive to light. Cardiovascular:      Rate and Rhythm: Normal rate. Pulmonary:      Breath sounds: Normal breath sounds. Abdominal:      General: Bowel sounds are normal.      Palpations: Abdomen is soft. Musculoskeletal:         General: Normal range of motion. Cervical back: Normal range of motion and neck supple. Skin:     General: Skin is warm and dry. Neurological:      Mental Status: He is alert and oriented to person, place, and time. Psychiatric:         Behavior: Behavior normal.      Today's vitals physical examination stable. We will maintain present meds and care. Follow-up visit with me 6 weeks and lab studies again prior. Plan Per Assessment:  Jayme Wilson was seen today for discuss labs, depression and diabetes. Diagnoses and all orders for this visit:    Essential hypertension  -     CBC with Auto Differential; Future  -     Comprehensive Metabolic Panel; Future  -     Hemoglobin A1C; Future  -     Lipid Panel; Future  -     T4; Future  -     TSH; Future    Hyperlipidemia, unspecified hyperlipidemia type  -     CBC with Auto Differential; Future  -     Comprehensive Metabolic Panel; Future  -     Hemoglobin A1C; Future  -     Lipid Panel; Future  -     T4; Future  -     TSH; Future    Stage 4 chronic kidney disease (HCC)  -     CBC with Auto Differential; Future  -     Comprehensive Metabolic Panel; Future  -     Hemoglobin A1C; Future  -     Lipid Panel; Future  -     T4; Future  -     TSH; Future    Type 2 diabetes mellitus with stage 3b chronic kidney disease, without long-term current use of insulin (HCC)  -     CBC with Auto Differential; Future  -     Comprehensive Metabolic Panel; Future  -     Hemoglobin A1C; Future  -     Lipid Panel; Future  -     T4; Future  -     TSH; Future    Other orders  -     furosemide (LASIX) 40 MG tablet; Take 1 tablet by mouth in the morning. Return in about 6 weeks (around 9/7/2022). Martin Dickerson DO    Note was generated with the assistance of voice recognition software. Document was reviewed however may contain grammatical errors.

## 2022-09-07 ENCOUNTER — OFFICE VISIT (OUTPATIENT)
Dept: PRIMARY CARE CLINIC | Age: 57
End: 2022-09-07
Payer: COMMERCIAL

## 2022-09-07 VITALS
HEART RATE: 54 BPM | WEIGHT: 194 LBS | TEMPERATURE: 98.1 F | SYSTOLIC BLOOD PRESSURE: 144 MMHG | OXYGEN SATURATION: 98 % | BODY MASS INDEX: 33.3 KG/M2 | DIASTOLIC BLOOD PRESSURE: 80 MMHG

## 2022-09-07 DIAGNOSIS — I10 ESSENTIAL HYPERTENSION: Primary | ICD-10-CM

## 2022-09-07 DIAGNOSIS — E11.22 TYPE 2 DIABETES MELLITUS WITH STAGE 3B CHRONIC KIDNEY DISEASE, WITHOUT LONG-TERM CURRENT USE OF INSULIN (HCC): ICD-10-CM

## 2022-09-07 DIAGNOSIS — E78.5 HYPERLIPIDEMIA, UNSPECIFIED HYPERLIPIDEMIA TYPE: ICD-10-CM

## 2022-09-07 DIAGNOSIS — N18.4 STAGE 4 CHRONIC KIDNEY DISEASE (HCC): ICD-10-CM

## 2022-09-07 DIAGNOSIS — N50.812 TESTICULAR PAIN, LEFT: ICD-10-CM

## 2022-09-07 DIAGNOSIS — D50.9 IRON DEFICIENCY ANEMIA, UNSPECIFIED IRON DEFICIENCY ANEMIA TYPE: ICD-10-CM

## 2022-09-07 DIAGNOSIS — N18.32 TYPE 2 DIABETES MELLITUS WITH STAGE 3B CHRONIC KIDNEY DISEASE, WITHOUT LONG-TERM CURRENT USE OF INSULIN (HCC): ICD-10-CM

## 2022-09-07 PROCEDURE — 3044F HG A1C LEVEL LT 7.0%: CPT | Performed by: FAMILY MEDICINE

## 2022-09-07 PROCEDURE — 99214 OFFICE O/P EST MOD 30 MIN: CPT | Performed by: FAMILY MEDICINE

## 2022-09-07 NOTE — PROGRESS NOTES
22     Dorie Meyer    : 1965 Sex: male   Age: 55 y.o. Chief Complaint   Patient presents with    Diabetes    Hypertension    Other     Will be starting iron infusions next week       Prior to Admission medications    Medication Sig Start Date End Date Taking? Authorizing Provider   furosemide (LASIX) 40 MG tablet Take 1 tablet by mouth in the morning. 22  Yes Sonya Franco DO   allopurinol (ZYLOPRIM) 300 MG tablet 1 qd 22  Yes Sonya Franco DO   empagliflozin (JARDIANCE) 10 MG tablet Take 1 tablet by mouth daily 3/9/22  Yes Sonya Franco DO   atorvastatin (LIPITOR) 40 MG tablet Take 1 tablet by mouth nightly 22  Yes Sonya Franco DO   Cholecalciferol (VITAMIN D3) 50 MCG (2000) CAPS Take by mouth   Yes Historical Provider, MD   metoprolol succinate (TOPROL XL) 50 MG extended release tablet Take 1 tablet by mouth 2 times daily  Patient taking differently: Take 50 mg by mouth daily 11/10/21  Yes Sonya Franco DO   glipiZIDE (GLUCOTROL XL) 5 MG extended release tablet 2 by mouth bid  Patient taking differently: 2 pills daily 9/3/21  Yes Sonya Franco DO   isosorbide dinitrate (ISORDIL) 20 MG tablet 2 tablets 3 times a day  Patient taking differently: 2 tablets 2 times a day 9/3/21  Yes Kit Franco, DO   benazepril-hydrochlorthiazide (LOTENSIN HCT) 20-12.5 MG per tablet 1am 1pm  Patient taking differently: daily 9/3/21  Yes Sonya Franco, DO   ONE TOUCH LANCETS MISC Test one time daily. Uses mini glucometer   Yes Historical Provider, MD   blood glucose test strips (ASCENSIA AUTODISC VI;ONE TOUCH ULTRA TEST VI) strip Test one time daily. Uses One Touch ultra   Yes Historical Provider, MD          HPI: Logan is seen today medical follow-up on hypertension diabetes chronic kidney disease stage IV hyperlipidemia. Medically overall seems to be doing well and medications well-tolerated. Sugars been very well controlled.   Complaints of testicular pain and its been off and on over the past 4 years by history. Firmness described of the left testicle. Ultrasound study will be completed. Review of Systems   Constitutional: Negative. HENT: Negative. Eyes: Negative. Respiratory: Negative. Gastrointestinal: Negative. Endocrine: Negative. Genitourinary: Negative. Musculoskeletal: Negative. Skin: Negative. Allergic/Immunologic: Negative. Neurological: Negative. Hematological: Negative. Psychiatric/Behavioral: Negative. Today systems review stable aside from chief complaint as noted. Current Outpatient Medications:     furosemide (LASIX) 40 MG tablet, Take 1 tablet by mouth in the morning., Disp: 30 tablet, Rfl: 5    allopurinol (ZYLOPRIM) 300 MG tablet, 1 qd, Disp: 90 tablet, Rfl: 2    empagliflozin (JARDIANCE) 10 MG tablet, Take 1 tablet by mouth daily, Disp: 30 tablet, Rfl: 5    atorvastatin (LIPITOR) 40 MG tablet, Take 1 tablet by mouth nightly, Disp: 90 tablet, Rfl: 3    Cholecalciferol (VITAMIN D3) 50 MCG (2000 UT) CAPS, Take by mouth, Disp: , Rfl:     metoprolol succinate (TOPROL XL) 50 MG extended release tablet, Take 1 tablet by mouth 2 times daily (Patient taking differently: Take 50 mg by mouth daily), Disp: 180 tablet, Rfl: 3    glipiZIDE (GLUCOTROL XL) 5 MG extended release tablet, 2 by mouth bid (Patient taking differently: 2 pills daily), Disp: 360 tablet, Rfl: 3    isosorbide dinitrate (ISORDIL) 20 MG tablet, 2 tablets 3 times a day (Patient taking differently: 2 tablets 2 times a day), Disp: 540 tablet, Rfl: 3    benazepril-hydrochlorthiazide (LOTENSIN HCT) 20-12.5 MG per tablet, 1am 1pm (Patient taking differently: daily), Disp: 60 tablet, Rfl: 3    ONE TOUCH LANCETS MISC, Test one time daily. Uses mini glucometer, Disp: , Rfl:     blood glucose test strips (ASCENSIA AUTODISC VI;ONE TOUCH ULTRA TEST VI) strip, Test one time daily.   Uses One Touch ultra, Disp: , Rfl:     No Known Allergies    Social History     Tobacco Use    Smoking status: Never    Smokeless tobacco: Never   Substance Use Topics    Alcohol use: Yes     Alcohol/week: 2.0 standard drinks     Types: 2 Cans of beer per week    Drug use: No      Past Surgical History:   Procedure Laterality Date    CT BIOPSY RENAL  11/2/2021    CT BIOPSY RENAL 11/2/2021 Lynn Wolfe MD [facility]    TONSILLECTOMY      VITRECTOMY Right 8/16/2021    PARS PLANA VITRECTOMY PAN RETINAL PHOTOAGULATION LASER GAS FLUID EXCHANGE RIGHT EYE performed by London Rockwell MD at [address] [place] [address]     Family History   Problem Relation Age of Onset    High Blood Pressure Mother     High Cholesterol Mother     High Blood Pressure Father     Breast Cancer Sister     Hypertension Brother     Hypertension Sister     No Known Problems Brother      Past Medical History:   Diagnosis Date    Cardiomyopathy ([facility] [address]     CHF (congestive heart failure) ([facility] [address]     CKD (chronic kidney disease)     HTN (hypertension)     Hypercholesterolemia     Medically noncompliant     Non-insulin dependent type 2 diabetes mellitus ([facility] [address]     Obesity        Vitals:    09/07/22 1131 09/07/22 1139   BP: (!) 158/84 (!) 144/80   Pulse: 54    Temp: 98.1 °F (36.7 °C)    SpO2: 98%    Weight: 194 lb (88 kg)      BP Readings from Last 3 Encounters:   09/07/22 (!) 144/80   07/27/22 136/78   05/31/22 136/66        Physical Exam  Vitals and nursing note reviewed. Constitutional:       Appearance: He is well-developed. HENT:      Head: Normocephalic. Right Ear: External ear normal.      Left Ear: External ear normal.      Nose: Nose normal.   Eyes:      Conjunctiva/sclera: Conjunctivae normal.      Pupils: Pupils are equal, round, and reactive to light. Cardiovascular:      Rate and Rhythm: Normal rate. Pulmonary:      Breath sounds: Normal breath sounds. Abdominal:      General: Bowel sounds are normal.      Palpations: Abdomen is soft.    Musculoskeletal:         General: Normal range of motion. Cervical back: Normal range of motion and neck supple. Skin:     General: Skin is warm and dry. Neurological:      Mental Status: He is alert and oriented to person, place, and time. Psychiatric:         Behavior: Behavior normal.      Today's vitals physical examination stable. Heart is regular lungs are clear. Medications as prescribed. Ultrasound of the testicles recommended. Reassessment with me in the month and sooner if problems. Lab studies prior to follow-up. Nephrology follow-up in 2 weeks. Plan Per Assessment:  Sourav Pierson was seen today for diabetes, hypertension and other. Diagnoses and all orders for this visit:    Essential hypertension    Type 2 diabetes mellitus with stage 3b chronic kidney disease, without long-term current use of insulin (HCC)    Iron deficiency anemia, unspecified iron deficiency anemia type    Stage 4 chronic kidney disease (HCC)    Hyperlipidemia, unspecified hyperlipidemia type    Testicular pain, left  -     US SCROTUM AND TESTICLES; Future          Return in about 1 month (around 10/7/2022). Melecio Chris,     Note was generated with the assistance of voice recognition software. Document was reviewed however may contain grammatical errors.

## 2022-09-23 DIAGNOSIS — N50.812 TESTICULAR PAIN, LEFT: Primary | ICD-10-CM

## 2022-09-23 DIAGNOSIS — N50.812 TESTICULAR PAIN, LEFT: ICD-10-CM

## 2022-09-23 DIAGNOSIS — N18.32 TYPE 2 DIABETES MELLITUS WITH STAGE 3B CHRONIC KIDNEY DISEASE, WITHOUT LONG-TERM CURRENT USE OF INSULIN (HCC): ICD-10-CM

## 2022-09-23 DIAGNOSIS — E11.22 TYPE 2 DIABETES MELLITUS WITH STAGE 3B CHRONIC KIDNEY DISEASE, WITHOUT LONG-TERM CURRENT USE OF INSULIN (HCC): ICD-10-CM

## 2022-09-23 DIAGNOSIS — E78.5 HYPERLIPIDEMIA, UNSPECIFIED HYPERLIPIDEMIA TYPE: ICD-10-CM

## 2022-09-23 DIAGNOSIS — N18.4 STAGE 4 CHRONIC KIDNEY DISEASE (HCC): ICD-10-CM

## 2022-09-23 DIAGNOSIS — I10 ESSENTIAL HYPERTENSION: ICD-10-CM

## 2022-09-23 LAB
ALBUMIN SERPL-MCNC: 4 G/DL (ref 3.5–5.2)
ALP BLD-CCNC: 103 U/L (ref 40–129)
ALT SERPL-CCNC: 20 U/L (ref 0–40)
ANION GAP SERPL CALCULATED.3IONS-SCNC: 11 MMOL/L (ref 7–16)
AST SERPL-CCNC: 22 U/L (ref 0–39)
BASOPHILS ABSOLUTE: 0.04 E9/L (ref 0–0.2)
BASOPHILS RELATIVE PERCENT: 0.7 % (ref 0–2)
BILIRUB SERPL-MCNC: 0.3 MG/DL (ref 0–1.2)
BUN BLDV-MCNC: 57 MG/DL (ref 6–20)
C-REACTIVE PROTEIN: 0.3 MG/DL (ref 0–0.4)
CALCIUM SERPL-MCNC: 9.1 MG/DL (ref 8.6–10.2)
CHLORIDE BLD-SCNC: 107 MMOL/L (ref 98–107)
CHOLESTEROL, TOTAL: 110 MG/DL (ref 0–199)
CO2: 23 MMOL/L (ref 22–29)
CREAT SERPL-MCNC: 2.4 MG/DL (ref 0.7–1.2)
EOSINOPHILS ABSOLUTE: 0.24 E9/L (ref 0.05–0.5)
EOSINOPHILS RELATIVE PERCENT: 3.9 % (ref 0–6)
FERRITIN: 150 NG/ML
GFR AFRICAN AMERICAN: 34
GFR NON-AFRICAN AMERICAN: 28 ML/MIN/1.73
GLUCOSE BLD-MCNC: 137 MG/DL (ref 74–99)
HBA1C MFR BLD: 7.4 % (ref 4–5.6)
HCT VFR BLD CALC: 29.7 % (ref 37–54)
HDLC SERPL-MCNC: 29 MG/DL
HEMOGLOBIN: 9.8 G/DL (ref 12.5–16.5)
IMMATURE GRANULOCYTES #: 0.02 E9/L
IMMATURE GRANULOCYTES %: 0.3 % (ref 0–5)
IRON: 52 MCG/DL (ref 59–158)
LDL CHOLESTEROL CALCULATED: 55 MG/DL (ref 0–99)
LYMPHOCYTES ABSOLUTE: 1.25 E9/L (ref 1.5–4)
LYMPHOCYTES RELATIVE PERCENT: 20.5 % (ref 20–42)
MAGNESIUM: 2 MG/DL (ref 1.6–2.6)
MCH RBC QN AUTO: 29.4 PG (ref 26–35)
MCHC RBC AUTO-ENTMCNC: 33 % (ref 32–34.5)
MCV RBC AUTO: 89.2 FL (ref 80–99.9)
MONOCYTES ABSOLUTE: 0.47 E9/L (ref 0.1–0.95)
MONOCYTES RELATIVE PERCENT: 7.7 % (ref 2–12)
NEUTROPHILS ABSOLUTE: 4.07 E9/L (ref 1.8–7.3)
NEUTROPHILS RELATIVE PERCENT: 66.9 % (ref 43–80)
PARATHYROID HORMONE INTACT: 96 PG/ML (ref 15–65)
PDW BLD-RTO: 14.3 FL (ref 11.5–15)
PHOSPHORUS: 3.9 MG/DL (ref 2.5–4.5)
PLATELET # BLD: 162 E9/L (ref 130–450)
PMV BLD AUTO: 10.7 FL (ref 7–12)
POTASSIUM SERPL-SCNC: 3.9 MMOL/L (ref 3.5–5)
RBC # BLD: 3.33 E12/L (ref 3.8–5.8)
SODIUM BLD-SCNC: 141 MMOL/L (ref 132–146)
T4 TOTAL: 6.7 MCG/DL (ref 4.5–11.7)
TOTAL PROTEIN: 7.1 G/DL (ref 6.4–8.3)
TRIGL SERPL-MCNC: 132 MG/DL (ref 0–149)
TSH SERPL DL<=0.05 MIU/L-ACNC: 0.62 UIU/ML (ref 0.27–4.2)
VITAMIN D 25-HYDROXY: 40 NG/ML (ref 30–100)
VLDLC SERPL CALC-MCNC: 26 MG/DL
WBC # BLD: 6.1 E9/L (ref 4.5–11.5)

## 2022-09-23 NOTE — TELEPHONE ENCOUNTER
Name of Medication(s) Requested:  Lisinopril 20 mg  Atorvastatin 40 mg    Pharmacy Requested:   [facility]    Medication(s) pended? [x] Yes  [] No    Last Appointment:  6/4/2019    Future appts:  Future Appointments   Date Time Provider Riley Goins   2/4/2020  1:15 PM DO ANNIE [facility]        Does patient need call back?   [] Yes  [x] No yes

## 2022-09-27 ENCOUNTER — OFFICE VISIT (OUTPATIENT)
Dept: PRIMARY CARE CLINIC | Age: 57
End: 2022-09-27
Payer: COMMERCIAL

## 2022-09-27 VITALS
SYSTOLIC BLOOD PRESSURE: 134 MMHG | TEMPERATURE: 98.1 F | HEART RATE: 57 BPM | OXYGEN SATURATION: 99 % | DIASTOLIC BLOOD PRESSURE: 76 MMHG

## 2022-09-27 DIAGNOSIS — N50.89 TESTICULAR NODULE: ICD-10-CM

## 2022-09-27 DIAGNOSIS — N50.812 TESTICULAR PAIN, LEFT: Primary | ICD-10-CM

## 2022-09-27 LAB — LACTATE DEHYDROGENASE: 265 U/L (ref 135–225)

## 2022-09-27 PROCEDURE — 99214 OFFICE O/P EST MOD 30 MIN: CPT | Performed by: FAMILY MEDICINE

## 2022-09-27 ASSESSMENT — ENCOUNTER SYMPTOMS
GASTROINTESTINAL NEGATIVE: 1
EYES NEGATIVE: 1
ALLERGIC/IMMUNOLOGIC NEGATIVE: 1
RESPIRATORY NEGATIVE: 1

## 2022-09-27 NOTE — PROGRESS NOTES
22     Shantelle Nicholas    : 1965 Sex: male   Age: 55 y.o. Chief Complaint   Patient presents with    Results     Labs and ultrasound results       Prior to Admission medications    Medication Sig Start Date End Date Taking? Authorizing Provider   furosemide (LASIX) 40 MG tablet Take 1 tablet by mouth in the morning. 22  Yes Scar Franco DO   allopurinol (ZYLOPRIM) 300 MG tablet 1 qd 22  Yes Scar Franco DO   empagliflozin (JARDIANCE) 10 MG tablet Take 1 tablet by mouth daily 3/9/22  Yes Scar Franco DO   atorvastatin (LIPITOR) 40 MG tablet Take 1 tablet by mouth nightly 22  Yes Scar Franco, DO   Cholecalciferol (VITAMIN D3) 50 MCG ( UT) CAPS Take by mouth   Yes Historical Provider, MD   metoprolol succinate (TOPROL XL) 50 MG extended release tablet Take 1 tablet by mouth 2 times daily  Patient taking differently: Take 50 mg by mouth daily 11/10/21  Yes Scar Franco DO   glipiZIDE (GLUCOTROL XL) 5 MG extended release tablet 2 by mouth bid  Patient taking differently: 2 pills daily 9/3/21  Yes Scar Franco DO   isosorbide dinitrate (ISORDIL) 20 MG tablet 2 tablets 3 times a day  Patient taking differently: 2 tablets 2 times a day 9/3/21  Yes Kit Franco, DO   benazepril-hydrochlorthiazide (LOTENSIN HCT) 20-12.5 MG per tablet 1am 1pm  Patient taking differently: daily 9/3/21  Yes Scar Franco DO   ONE TOUCH LANCETS MISC Test one time daily. Uses mini glucometer   Yes Historical Provider, MD   blood glucose test strips (ASCENSIA AUTODISC VI;ONE TOUCH ULTRA TEST VI) strip Test one time daily. Uses One Touch ultra   Yes Historical Provider, MD          HPI: Evaluated today for follow-up on abnormal testicular ultrasound. Reviewed left testicular nodules as noted. Additional lab studies to be completed. Spoke with urology today and plans are for an evaluation and further treatment as necessary.           Review of Systems Constitutional: Negative. HENT: Negative. Eyes: Negative. Respiratory: Negative. Gastrointestinal: Negative. Endocrine: Negative. Genitourinary: Negative. Musculoskeletal: Negative. Skin: Negative. Allergic/Immunologic: Negative. Neurological: Negative. Hematological: Negative. Psychiatric/Behavioral: Negative. Systems review overall stable aside from the testicular discomfort as noted. Current Outpatient Medications:     furosemide (LASIX) 40 MG tablet, Take 1 tablet by mouth in the morning., Disp: 30 tablet, Rfl: 5    allopurinol (ZYLOPRIM) 300 MG tablet, 1 qd, Disp: 90 tablet, Rfl: 2    empagliflozin (JARDIANCE) 10 MG tablet, Take 1 tablet by mouth daily, Disp: 30 tablet, Rfl: 5    atorvastatin (LIPITOR) 40 MG tablet, Take 1 tablet by mouth nightly, Disp: 90 tablet, Rfl: 3    Cholecalciferol (VITAMIN D3) 50 MCG (2000 UT) CAPS, Take by mouth, Disp: , Rfl:     metoprolol succinate (TOPROL XL) 50 MG extended release tablet, Take 1 tablet by mouth 2 times daily (Patient taking differently: Take 50 mg by mouth daily), Disp: 180 tablet, Rfl: 3    glipiZIDE (GLUCOTROL XL) 5 MG extended release tablet, 2 by mouth bid (Patient taking differently: 2 pills daily), Disp: 360 tablet, Rfl: 3    isosorbide dinitrate (ISORDIL) 20 MG tablet, 2 tablets 3 times a day (Patient taking differently: 2 tablets 2 times a day), Disp: 540 tablet, Rfl: 3    benazepril-hydrochlorthiazide (LOTENSIN HCT) 20-12.5 MG per tablet, 1am 1pm (Patient taking differently: daily), Disp: 60 tablet, Rfl: 3    ONE TOUCH LANCETS MISC, Test one time daily. Uses mini glucometer, Disp: , Rfl:     blood glucose test strips (ASCENSIA AUTODISC VI;ONE TOUCH ULTRA TEST VI) strip, Test one time daily. Uses One Touch ultra, Disp: , Rfl:     No Known Allergies    Social History     Tobacco Use    Smoking status: Never    Smokeless tobacco: Never   Substance Use Topics    Alcohol use:  Yes     Alcohol/week: 2.0 standard drinks     Types: 2 Cans of beer per week    Drug use: No      Past Surgical History:   Procedure Laterality Date    CT BIOPSY RENAL  11/2/2021    CT BIOPSY RENAL 11/2/2021 Tab Mcdonald MD [facility]    TONSILLECTOMY      VITRECTOMY Right 8/16/2021    PARS PLANA VITRECTOMY PAN RETINAL PHOTOAGULATION LASER GAS FLUID EXCHANGE RIGHT EYE performed by Joe Gresham MD at [address] [place] [address]     Family History   Problem Relation Age of Onset    High Blood Pressure Mother     High Cholesterol Mother     High Blood Pressure Father     Breast Cancer Sister     Hypertension Brother     Hypertension Sister     No Known Problems Brother      Past Medical History:   Diagnosis Date    Cardiomyopathy ([address]     CHF (congestive heart failure) ([address]     CKD (chronic kidney disease)     HTN (hypertension)     Hypercholesterolemia     Medically noncompliant     Non-insulin dependent type 2 diabetes mellitus ([address]     Obesity        Vitals:    09/27/22 1019   BP: 134/76   Pulse: 57   Temp: 98.1 °F (36.7 °C)   SpO2: 99%     BP Readings from Last 3 Encounters:   09/27/22 134/76   09/07/22 (!) 144/80   07/27/22 136/78        Physical Exam  Vitals and nursing note reviewed. Constitutional:       Appearance: He is well-developed. HENT:      Head: Normocephalic. Right Ear: External ear normal.      Left Ear: External ear normal.      Nose: Nose normal.   Eyes:      Conjunctiva/sclera: Conjunctivae normal.      Pupils: Pupils are equal, round, and reactive to light. Cardiovascular:      Rate and Rhythm: Normal rate. Pulmonary:      Breath sounds: Normal breath sounds. Abdominal:      General: Bowel sounds are normal.      Palpations: Abdomen is soft. Musculoskeletal:         General: Normal range of motion. Cervical back: Normal range of motion and neck supple. Skin:     General: Skin is warm and dry. Neurological:      Mental Status: He is alert and oriented to person, place, and time.    Psychiatric: Behavior: Behavior normal.        Today's vitals physical examination stable. Testicular nodules as noted. Reviewed ultrasound with him today and we will arrange for urology referral.  Additional lab studies to be completed and we will follow-up by phone. Plan Per Assessment:  Janel Coronado was seen today for results. Diagnoses and all orders for this visit:    Testicular pain, left  -     DORA - Tyrese Hackett MD, [facility]    Testicular nodule  -     DORA - Tyrese Hackett MD, Urology, [facility]  -     HCG Qualitative, Serum; Future  -     Lactate Dehydrogenase; Future          No follow-ups on file. Chicho Pak,     Note was generated with the assistance of voice recognition software. Document was reviewed however may contain grammatical errors.

## 2022-09-28 ENCOUNTER — TELEPHONE (OUTPATIENT)
Dept: PRIMARY CARE CLINIC | Age: 57
End: 2022-09-28

## 2022-09-28 LAB — AFP-TUMOR MARKER: 3 NG/ML (ref 0–9)

## 2022-09-29 LAB — GONADOTROPIN, CHORIONIC (HCG) QUANT: 0.8 MIU/ML

## 2022-10-05 ENCOUNTER — HOSPITAL ENCOUNTER (OUTPATIENT)
Dept: GENERAL RADIOLOGY | Age: 57
Discharge: HOME | End: 2022-10-07
Payer: COMMERCIAL

## 2022-10-05 ENCOUNTER — HOSPITAL ENCOUNTER (OUTPATIENT)
Age: 57
Discharge: HOME | End: 2022-10-07
Payer: COMMERCIAL

## 2022-10-05 DIAGNOSIS — C62.90 MALIGNANT NEOPLASM OF TESTICLE, UNSPECIFIED LATERALITY, UNSPECIFIED WHETHER DESCENDED OR UNDESCENDED (HCC): ICD-10-CM

## 2022-10-05 PROCEDURE — 71046 X-RAY EXAM CHEST 2 VIEWS: CPT

## 2022-10-11 ENCOUNTER — HOSPITAL ENCOUNTER (OUTPATIENT)
Age: 57
Discharge: HOME | End: 2022-10-13

## 2022-10-11 PROCEDURE — 88305 TISSUE EXAM BY PATHOLOGIST: CPT

## 2022-10-11 PROCEDURE — 88342 IMHCHEM/IMCYTCHM 1ST ANTB: CPT

## 2022-10-11 PROCEDURE — 88341 IMHCHEM/IMCYTCHM EA ADD ANTB: CPT

## 2022-10-11 PROCEDURE — 88309 TISSUE EXAM BY PATHOLOGIST: CPT

## 2022-10-22 ENCOUNTER — TRANSCRIBE ORDERS (OUTPATIENT)
Dept: ADMINISTRATIVE | Age: 57
End: 2022-10-22

## 2022-10-22 DIAGNOSIS — C62.10 MALIGNANT NEOPLASM OF DESCENDED TESTIS, UNSPECIFIED LATERALITY (HCC): Primary | ICD-10-CM

## 2022-10-27 LAB — DIABETIC RETINOPATHY: POSITIVE

## 2022-10-31 ENCOUNTER — OFFICE VISIT (OUTPATIENT)
Dept: PRIMARY CARE CLINIC | Age: 57
End: 2022-10-31
Payer: COMMERCIAL

## 2022-10-31 VITALS
SYSTOLIC BLOOD PRESSURE: 138 MMHG | TEMPERATURE: 97.7 F | HEART RATE: 65 BPM | WEIGHT: 198 LBS | BODY MASS INDEX: 33.99 KG/M2 | DIASTOLIC BLOOD PRESSURE: 88 MMHG | OXYGEN SATURATION: 97 %

## 2022-10-31 DIAGNOSIS — N50.89 TESTICULAR NODULE: Primary | ICD-10-CM

## 2022-10-31 DIAGNOSIS — I10 ESSENTIAL HYPERTENSION: ICD-10-CM

## 2022-10-31 DIAGNOSIS — E78.5 HYPERLIPIDEMIA, UNSPECIFIED HYPERLIPIDEMIA TYPE: ICD-10-CM

## 2022-10-31 DIAGNOSIS — N18.4 STAGE 4 CHRONIC KIDNEY DISEASE (HCC): ICD-10-CM

## 2022-10-31 DIAGNOSIS — C62.92 MALIGNANT NEOPLASM OF LEFT TESTIS, UNSPECIFIED WHETHER DESCENDED OR UNDESCENDED (HCC): ICD-10-CM

## 2022-10-31 PROCEDURE — 3078F DIAST BP <80 MM HG: CPT | Performed by: FAMILY MEDICINE

## 2022-10-31 PROCEDURE — 99214 OFFICE O/P EST MOD 30 MIN: CPT | Performed by: FAMILY MEDICINE

## 2022-10-31 PROCEDURE — 3074F SYST BP LT 130 MM HG: CPT | Performed by: FAMILY MEDICINE

## 2022-10-31 RX ORDER — ALLOPURINOL 300 MG/1
TABLET ORAL
Qty: 90 TABLET | Refills: 2 | Status: SHIPPED | OUTPATIENT
Start: 2022-10-31

## 2022-10-31 RX ORDER — GLIPIZIDE 5 MG/1
5 TABLET, FILM COATED, EXTENDED RELEASE ORAL DAILY
Qty: 90 TABLET | Refills: 3 | Status: SHIPPED | OUTPATIENT
Start: 2022-10-31

## 2022-10-31 RX ORDER — METOPROLOL SUCCINATE 50 MG/1
50 TABLET, EXTENDED RELEASE ORAL DAILY
Qty: 90 TABLET | Refills: 3 | Status: SHIPPED | OUTPATIENT
Start: 2022-10-31

## 2022-10-31 RX ORDER — FUROSEMIDE 40 MG/1
40 TABLET ORAL DAILY
Qty: 30 TABLET | Refills: 5 | Status: SHIPPED | OUTPATIENT
Start: 2022-10-31

## 2022-10-31 RX ORDER — ATORVASTATIN CALCIUM 40 MG/1
40 TABLET, FILM COATED ORAL NIGHTLY
Qty: 90 TABLET | Refills: 3 | Status: SHIPPED | OUTPATIENT
Start: 2022-10-31

## 2022-10-31 RX ORDER — BENAZEPRIL HYDROCHLORIDE AND HYDROCHLOROTHIAZIDE 20; 12.5 MG/1; MG/1
1 TABLET ORAL DAILY
Qty: 90 TABLET | Refills: 3 | Status: SHIPPED | OUTPATIENT
Start: 2022-10-31

## 2022-10-31 RX ORDER — ISOSORBIDE DINITRATE 20 MG/1
TABLET ORAL
Qty: 360 TABLET | Refills: 3 | Status: SHIPPED | OUTPATIENT
Start: 2022-10-31

## 2022-10-31 ASSESSMENT — ENCOUNTER SYMPTOMS
RESPIRATORY NEGATIVE: 1
GASTROINTESTINAL NEGATIVE: 1
EYES NEGATIVE: 1
ALLERGIC/IMMUNOLOGIC NEGATIVE: 1

## 2022-10-31 NOTE — PROGRESS NOTES
10/31/22     Savage Trevizo    : 1965 Sex: male   Age: 55 y.o. Chief Complaint   Patient presents with    Other       Prior to Admission medications    Medication Sig Start Date End Date Taking? Authorizing Provider   allopurinol (ZYLOPRIM) 300 MG tablet 1 qd 10/31/22  Yes [facility], [facility]   atorvastatin (LIPITOR) 40 MG tablet Take 1 tablet by mouth nightly 10/31/22  Yes [facility] Lit[facility], DO   benazepril-hydrochlorthiazide (LOTENSIN HCT) 20-12.5 MG per tablet Take 1 tablet by mouth daily 10/31/22  Yes [facility] Tra[facility], DO   empagliflozin (JARDIANCE) 10 MG tablet Take 1 tablet by mouth daily 10/31/22  Yes [facility], DO   glipiZIDE (GLUCOTROL XL) 5 MG extended release tablet Take 1 tablet by mouth daily 10/31/22  Yes [facility], DO   furosemide (LASIX) 40 MG tablet Take 1 tablet by mouth daily 10/31/22  Yes [facility], DO   isosorbide dinitrate (ISORDIL) 20 MG tablet 2 tablets 2 times a day 10/31/22  Yes [facility], DO   metoprolol succinate (TOPROL XL) 50 MG extended release tablet Take 1 tablet by mouth daily 10/31/22  Yes Jameson Hemp Litikoff, DO   Cholecalciferol (VITAMIN D3) 50 MCG (2000 UT) CAPS Take by mouth   Yes Historical Provider, MD   ONE TOUCH LANCETS MISC Test one time daily. Uses mini glucometer   Yes Historical Provider, MD   blood glucose test strips (ASCENSIA AUTODISC VI;ONE TOUCH ULTRA TEST VI) strip Test one time daily. Uses One Touch ultra   Yes Historical Provider, MD          HPI:   Seen today in medical follow-up recent problems with left testicular lump mass. Underwent orchiectomy on the left side and positive for stage I testicular cancer. Additional work-up in progress and will be following up with Dr. Lainey Jimenez. Review of Systems   Constitutional: Negative. HENT: Negative. Eyes: Negative. Respiratory: Negative. Gastrointestinal: Negative. Endocrine: Negative. Genitourinary: Negative. Musculoskeletal: Negative. Skin: Negative. Allergic/Immunologic: Negative. Neurological: Negative. Hematological: Negative. Psychiatric/Behavioral: Negative. Current Outpatient Medications:     allopurinol (ZYLOPRIM) 300 MG tablet, 1 qd, Disp: 90 tablet, Rfl: 2    atorvastatin (LIPITOR) 40 MG tablet, Take 1 tablet by mouth nightly, Disp: 90 tablet, Rfl: 3    benazepril-hydrochlorthiazide (LOTENSIN HCT) 20-12.5 MG per tablet, Take 1 tablet by mouth daily, Disp: 90 tablet, Rfl: 3    empagliflozin (JARDIANCE) 10 MG tablet, Take 1 tablet by mouth daily, Disp: 30 tablet, Rfl: 5    glipiZIDE (GLUCOTROL XL) 5 MG extended release tablet, Take 1 tablet by mouth daily, Disp: 90 tablet, Rfl: 3    furosemide (LASIX) 40 MG tablet, Take 1 tablet by mouth daily, Disp: 30 tablet, Rfl: 5    isosorbide dinitrate (ISORDIL) 20 MG tablet, 2 tablets 2 times a day, Disp: 360 tablet, Rfl: 3    metoprolol succinate (TOPROL XL) 50 MG extended release tablet, Take 1 tablet by mouth daily, Disp: 90 tablet, Rfl: 3    Cholecalciferol (VITAMIN D3) 50 MCG (2000 UT) CAPS, Take by mouth, Disp: , Rfl:     ONE TOUCH LANCETS MISC, Test one time daily. Uses mini glucometer, Disp: , Rfl:     blood glucose test strips (ASCENSIA AUTODISC VI;ONE TOUCH ULTRA TEST VI) strip, Test one time daily. Uses One Touch ultra, Disp: , Rfl:     No Known Allergies    Social History     Tobacco Use    Smoking status: Never    Smokeless tobacco: Never   Substance Use Topics    Alcohol use:  Yes     Alcohol/week: 2.0 standard drinks     Types: 2 Cans of beer per week    Drug use: No      Past Surgical History:   Procedure Laterality Date    CT BIOPSY RENAL  11/2/2021    CT BIOPSY RENAL 11/2/2021 Latasha Chopra MD [facility]    TONSILLECTOMY      VITRECTOMY Right 8/16/2021    PARS PLANA VITRECTOMY PAN RETINAL PHOTOAGULATION LASER GAS FLUID EXCHANGE RIGHT EYE performed by Toby Whittington MD at [address] [place] [address]     Family History   Problem Relation Age of Onset    High Blood Pressure Mother     High Cholesterol Mother     High Blood Pressure Father     Breast Cancer Sister     Hypertension Brother     Hypertension Sister     No Known Problems Brother      Past Medical History:   Diagnosis Date    [facility] [address]     CHF (congestive heart failure) ([facility]     CKD (chronic kidney disease)     HTN (hypertension)     Hypercholesterolemia     Medically noncompliant     Non-insulin dependent type 2 diabetes mellitus ([facility]     Obesity        Vitals:    10/31/22 1206   BP: 138/88   Pulse: 65   Temp: 97.7 °F (36.5 °C)   SpO2: 97%   Weight: 198 lb (89.8 kg)     BP Readings from Last 3 Encounters:   10/31/22 138/88   09/27/22 134/76   09/07/22 (!) 144/80        Physical Exam  Vitals and nursing note reviewed. Constitutional:       Appearance: He is well-developed. HENT:      Head: Normocephalic. Right Ear: External ear normal.      Left Ear: External ear normal.      Nose: Nose normal.   Eyes:      Conjunctiva/sclera: Conjunctivae normal.      Pupils: Pupils are equal, round, and reactive to light. Cardiovascular:      Rate and Rhythm: Normal rate. Pulmonary:      Breath sounds: Normal breath sounds. Abdominal:      General: Bowel sounds are normal.      Palpations: Abdomen is soft. Musculoskeletal:         General: Normal range of motion. Cervical back: Normal range of motion and neck supple. Skin:     General: Skin is warm and dry. Neurological:      Mental Status: He is alert and oriented to person, place, and time. Psychiatric:         Behavior: Behavior normal.        Today's vitals physical exam stable. Medications as prescribed. We will plan a 4-week follow-up after CT of the abdomen and chest and pelvis and then oncology consultation and further discussion. All meds as presently prescribed.   Blood sugar is been slightly elevated encouraged tighter control on a diet present meds and then review with next visit. Plan Per Assessment:  Penelope Hanks was seen today for other. Diagnoses and all orders for this visit:    Testicular nodule    Hyperlipidemia, unspecified hyperlipidemia type    Essential hypertension    Stage 4 chronic kidney disease (HCC)    Malignant neoplasm of left testis, unspecified whether descended or undescended ([facility]    Other orders  -     allopurinol (ZYLOPRIM) 300 MG tablet; 1 qd  -     atorvastatin (LIPITOR) 40 MG tablet; Take 1 tablet by mouth nightly  -     benazepril-hydrochlorthiazide (LOTENSIN HCT) 20-12.5 MG per tablet; Take 1 tablet by mouth daily  -     empagliflozin (JARDIANCE) 10 MG tablet; Take 1 tablet by mouth daily  -     glipiZIDE (GLUCOTROL XL) 5 MG extended release tablet; Take 1 tablet by mouth daily  -     furosemide (LASIX) 40 MG tablet; Take 1 tablet by mouth daily  -     isosorbide dinitrate (ISORDIL) 20 MG tablet; 2 tablets 2 times a day  -     metoprolol succinate (TOPROL XL) 50 MG extended release tablet; Take 1 tablet by mouth daily          Return in about 4 weeks (around 11/28/2022). Soco Blake DO    Note was generated with the assistance of voice recognition software. Document was reviewed however may contain grammatical errors.

## 2022-12-06 ENCOUNTER — OFFICE VISIT (OUTPATIENT)
Dept: PRIMARY CARE CLINIC | Age: 57
End: 2022-12-06
Payer: COMMERCIAL

## 2022-12-06 VITALS
WEIGHT: 197 LBS | DIASTOLIC BLOOD PRESSURE: 80 MMHG | BODY MASS INDEX: 33.63 KG/M2 | SYSTOLIC BLOOD PRESSURE: 158 MMHG | HEIGHT: 64 IN | HEART RATE: 90 BPM | OXYGEN SATURATION: 97 % | TEMPERATURE: 97.8 F

## 2022-12-06 DIAGNOSIS — I10 ESSENTIAL HYPERTENSION: ICD-10-CM

## 2022-12-06 DIAGNOSIS — N18.4 STAGE 4 CHRONIC KIDNEY DISEASE (HCC): ICD-10-CM

## 2022-12-06 DIAGNOSIS — Z85.47 PERSONAL HISTORY OF TESTICULAR CANCER: Primary | ICD-10-CM

## 2022-12-06 DIAGNOSIS — C62.92 MALIGNANT NEOPLASM OF LEFT TESTIS, UNSPECIFIED WHETHER DESCENDED OR UNDESCENDED (HCC): ICD-10-CM

## 2022-12-06 PROCEDURE — 3078F DIAST BP <80 MM HG: CPT | Performed by: FAMILY MEDICINE

## 2022-12-06 PROCEDURE — 99214 OFFICE O/P EST MOD 30 MIN: CPT | Performed by: FAMILY MEDICINE

## 2022-12-06 PROCEDURE — 3074F SYST BP LT 130 MM HG: CPT | Performed by: FAMILY MEDICINE

## 2022-12-06 RX ORDER — METOPROLOL SUCCINATE 50 MG/1
TABLET, EXTENDED RELEASE ORAL
Qty: 180 TABLET | Refills: 3 | Status: SHIPPED | OUTPATIENT
Start: 2022-12-06

## 2022-12-06 NOTE — PROGRESS NOTES
22     Ansley Vargas    : 1965 Sex: male   Age: 55 y.o. Chief Complaint   Patient presents with    Follow-up    Testicle Pain       Prior to Admission medications    Medication Sig Start Date End Date Taking? Authorizing Provider   empagliflozin (JARDIANCE) 25 MG tablet 1 qd 22  Yes Janine Franco DO   metoprolol succinate (TOPROL XL) 50 MG extended release tablet 1 q12 22  Yes Janine Franco DO   allopurinol (ZYLOPRIM) 300 MG tablet 1 qd 10/31/22  Yes Janine Franco DO   atorvastatin (LIPITOR) 40 MG tablet Take 1 tablet by mouth nightly 10/31/22  Yes Janine Franco DO   benazepril-hydrochlorthiazide (LOTENSIN HCT) 20-12.5 MG per tablet Take 1 tablet by mouth daily  Patient taking differently: Take 1 tablet by mouth in the morning and at bedtime 10/31/22  Yes Janine Franco DO   glipiZIDE (GLUCOTROL XL) 5 MG extended release tablet Take 1 tablet by mouth daily  Patient taking differently: 5 mg daily 2 twice a day 10/31/22  Yes Janine Franco DO   furosemide (LASIX) 40 MG tablet Take 1 tablet by mouth daily 10/31/22  Yes Janine Franco DO   isosorbide dinitrate (ISORDIL) 20 MG tablet 2 tablets 2 times a day  Patient taking differently: 2 tablets 3 times a day 10/31/22  Yes Janine Franco DO   Cholecalciferol (VITAMIN D3) 50 MCG ( UT) CAPS Take by mouth   Yes Historical Provider, MD   ONE TOUCH LANCETS MISC Test one time daily. Uses mini glucometer   Yes Historical Provider, MD   blood glucose test strips (ASCENSIA AUTODISC VI;ONE TOUCH ULTRA TEST VI) strip Test one time daily. Uses One Touch ultra   Yes Historical Provider, MD          HPI: Evaluated today in follow-up history of testicular cancer and was evaluated by hematology. Concerns with chronic kidney disease and the effects of chemotherapy. Recommendations have been made for evaluation for lymph node removal and awaiting insurance approval for urology evaluation in [place]. Hypertension has been persistent and we will adjust his metoprolol to 50 mg twice a day and then reassess here in the next 4 weeks. Blood sugars have been elevated and adjusted Jardiance to 25 mg a day and again follow-up in 4 weeks blood work prior. Review of Systems   Constitutional: Negative. HENT: Negative. Eyes: Negative. Respiratory: Negative. Gastrointestinal: Negative. Endocrine: Negative. Genitourinary: Negative. Musculoskeletal: Negative. Skin: Negative. Allergic/Immunologic: Negative. Neurological: Negative. Hematological: Negative. Psychiatric/Behavioral: Negative. Systems review stable aside from noted chief complaint. Current Outpatient Medications:     empagliflozin (JARDIANCE) 25 MG tablet, 1 qd, Disp: 30 tablet, Rfl: 1    metoprolol succinate (TOPROL XL) 50 MG extended release tablet, 1 q12, Disp: 180 tablet, Rfl: 3    allopurinol (ZYLOPRIM) 300 MG tablet, 1 qd, Disp: 90 tablet, Rfl: 2    atorvastatin (LIPITOR) 40 MG tablet, Take 1 tablet by mouth nightly, Disp: 90 tablet, Rfl: 3    benazepril-hydrochlorthiazide (LOTENSIN HCT) 20-12.5 MG per tablet, Take 1 tablet by mouth daily (Patient taking differently: Take 1 tablet by mouth in the morning and at bedtime), Disp: 90 tablet, Rfl: 3    glipiZIDE (GLUCOTROL XL) 5 MG extended release tablet, Take 1 tablet by mouth daily (Patient taking differently: 5 mg daily 2 twice a day), Disp: 90 tablet, Rfl: 3    furosemide (LASIX) 40 MG tablet, Take 1 tablet by mouth daily, Disp: 30 tablet, Rfl: 5    isosorbide dinitrate (ISORDIL) 20 MG tablet, 2 tablets 2 times a day (Patient taking differently: 2 tablets 3 times a day), Disp: 360 tablet, Rfl: 3    Cholecalciferol (VITAMIN D3) 50 MCG (2000 UT) CAPS, Take by mouth, Disp: , Rfl:     ONE TOUCH LANCETS MISC, Test one time daily.   Uses mini glucometer, Disp: , Rfl:     blood glucose test strips (ASCENSIA AUTODISC VI;ONE TOUCH ULTRA TEST VI) strip, Test one time daily. Uses One Touch ultra, Disp: , Rfl:     No Known Allergies    Social History     Tobacco Use    Smoking status: Never    Smokeless tobacco: Never   Substance Use Topics    Alcohol use: Yes     Alcohol/week: 2.0 standard drinks     Types: 2 Cans of beer per week    Drug use: No      Past Surgical History:   Procedure Laterality Date    CT BIOPSY RENAL  11/2/2021    CT BIOPSY RENAL 11/2/2021 Shekhar Hardy MD [facility]    TONSILLECTOMY      VITRECTOMY Right 8/16/2021    PARS PLANA VITRECTOMY PAN RETINAL PHOTOAGULATION LASER GAS FLUID EXCHANGE RIGHT EYE performed by Balta Cuellar MD at [address] [place] [address]     Family History   Problem Relation Age of Onset    High Blood Pressure Mother     High Cholesterol Mother     High Blood Pressure Father     Breast Cancer Sister     Hypertension Brother     Hypertension Sister     No Known Problems Brother      Past Medical History:   Diagnosis Date    Card[facility]omy[facility] ([facility]     CHF (congestive heart failure) ([facility]     CKD (chronic kidney disease)     HTN (hypertension)     Hypercholesterolemia     Medically noncompliant     Non-insulin dependent type 2 diabetes mellitus ([facility]     Obesity        Vitals:    12/06/22 1156   BP: (!) 158/80   Pulse: 90   Temp: 97.8 °F (36.6 °C)   SpO2: 97%   Weight: 197 lb (89.4 kg)   Height: 5' 4\" (1.626 m)     BP Readings from Last 3 Encounters:   12/06/22 (!) 158/80   10/31/22 138/88   09/27/22 134/76        Physical Exam  Vitals and nursing note reviewed. Constitutional:       Appearance: He is well-developed. HENT:      Head: Normocephalic. Right Ear: External ear normal.      Left Ear: External ear normal.      Nose: Nose normal.   Eyes:      Conjunctiva/sclera: Conjunctivae normal.      Pupils: Pupils are equal, round, and reactive to light. Cardiovascular:      Rate and Rhythm: Normal rate. Pulmonary:      Breath sounds: Normal breath sounds.    Abdominal:      General: Bowel sounds are normal.      Palpations: Abdomen is soft. Musculoskeletal:         General: Normal range of motion. Cervical back: Normal range of motion and neck supple. Skin:     General: Skin is warm and dry. Neurological:      Mental Status: He is alert and oriented to person, place, and time. Psychiatric:         Behavior: Behavior normal.      Today's vitals physical exam overall stable. Blood pressure elevation as noted and adjustments made with metoprolol today. Blood sugar elevation and again adjustments with meds and reassessment with me in 4 weeks with home sugars and also blood work prior. Plan Per Assessment:  Rosemarie Coleman was seen today for follow-up and testicle pain. Diagnoses and all orders for this visit:    Personal history of testicular cancer    Essential hypertension  -     CBC with Auto Differential; Future  -     Comprehensive Metabolic Panel; Future  -     Hemoglobin A1C; Future  -     Lipid Panel; Future  -     T4; Future  -     TSH; Future    Malignant neoplasm of left testis, unspecified whether descended or undescended ([facility]  -     CBC with Auto Differential; Future  -     Comprehensive Metabolic Panel; Future  -     Hemoglobin A1C; Future  -     Lipid Panel; Future  -     T4; Future  -     TSH; Future    Stage 4 chronic kidney disease (HCC)  -     CBC with Auto Differential; Future  -     Comprehensive Metabolic Panel; Future  -     Hemoglobin A1C; Future  -     Lipid Panel; Future  -     T4; Future  -     TSH; Future    Other orders  -     empagliflozin (JARDIANCE) 25 MG tablet; 1 qd  -     metoprolol succinate (TOPROL XL) 50 MG extended release tablet; 1 q12          Return in about 4 weeks (around 1/3/2023). Aaron Gill DO    Note was generated with the assistance of voice recognition software. Document was reviewed however may contain grammatical errors.

## 2023-01-06 ENCOUNTER — OFFICE VISIT (OUTPATIENT)
Dept: PRIMARY CARE CLINIC | Age: 58
End: 2023-01-06
Payer: COMMERCIAL

## 2023-01-06 VITALS
HEIGHT: 64 IN | BODY MASS INDEX: 33.97 KG/M2 | DIASTOLIC BLOOD PRESSURE: 86 MMHG | TEMPERATURE: 97.7 F | OXYGEN SATURATION: 98 % | HEART RATE: 58 BPM | SYSTOLIC BLOOD PRESSURE: 136 MMHG | WEIGHT: 199 LBS

## 2023-01-06 DIAGNOSIS — C62.92 MALIGNANT NEOPLASM OF LEFT TESTIS, UNSPECIFIED WHETHER DESCENDED OR UNDESCENDED (HCC): ICD-10-CM

## 2023-01-06 DIAGNOSIS — N18.4 STAGE 4 CHRONIC KIDNEY DISEASE (HCC): ICD-10-CM

## 2023-01-06 DIAGNOSIS — E11.22 TYPE 2 DIABETES MELLITUS WITH STAGE 3B CHRONIC KIDNEY DISEASE, WITHOUT LONG-TERM CURRENT USE OF INSULIN (HCC): ICD-10-CM

## 2023-01-06 DIAGNOSIS — I10 ESSENTIAL HYPERTENSION: ICD-10-CM

## 2023-01-06 DIAGNOSIS — N18.32 TYPE 2 DIABETES MELLITUS WITH STAGE 3B CHRONIC KIDNEY DISEASE, WITHOUT LONG-TERM CURRENT USE OF INSULIN (HCC): ICD-10-CM

## 2023-01-06 DIAGNOSIS — M17.0 PRIMARY OSTEOARTHRITIS OF BOTH KNEES: ICD-10-CM

## 2023-01-06 DIAGNOSIS — I10 ESSENTIAL HYPERTENSION: Primary | ICD-10-CM

## 2023-01-06 DIAGNOSIS — Z85.47 PERSONAL HISTORY OF TESTICULAR CANCER: ICD-10-CM

## 2023-01-06 LAB
ALBUMIN SERPL-MCNC: 4.1 G/DL (ref 3.5–5.2)
ALP BLD-CCNC: 168 U/L (ref 40–129)
ALT SERPL-CCNC: 24 U/L (ref 0–40)
ANION GAP SERPL CALCULATED.3IONS-SCNC: 17 MMOL/L (ref 7–16)
AST SERPL-CCNC: 21 U/L (ref 0–39)
BASOPHILS ABSOLUTE: 0.05 E9/L (ref 0–0.2)
BASOPHILS RELATIVE PERCENT: 0.8 % (ref 0–2)
BILIRUB SERPL-MCNC: 0.3 MG/DL (ref 0–1.2)
BUN BLDV-MCNC: 73 MG/DL (ref 6–20)
CALCIUM SERPL-MCNC: 9.2 MG/DL (ref 8.6–10.2)
CHLORIDE BLD-SCNC: 104 MMOL/L (ref 98–107)
CHOLESTEROL, TOTAL: 185 MG/DL (ref 0–199)
CO2: 19 MMOL/L (ref 22–29)
CREAT SERPL-MCNC: 2.9 MG/DL (ref 0.7–1.2)
EOSINOPHILS ABSOLUTE: 0.24 E9/L (ref 0.05–0.5)
EOSINOPHILS RELATIVE PERCENT: 3.8 % (ref 0–6)
GFR SERPL CREATININE-BSD FRML MDRD: 25 ML/MIN/1.73
GLUCOSE BLD-MCNC: 230 MG/DL (ref 74–99)
HBA1C MFR BLD: 8.1 % (ref 4–5.6)
HCT VFR BLD CALC: 34.2 % (ref 37–54)
HDLC SERPL-MCNC: 28 MG/DL
HEMOGLOBIN: 11.7 G/DL (ref 12.5–16.5)
IMMATURE GRANULOCYTES #: 0.02 E9/L
IMMATURE GRANULOCYTES %: 0.3 % (ref 0–5)
LDL CHOLESTEROL CALCULATED: ABNORMAL MG/DL (ref 0–99)
LYMPHOCYTES ABSOLUTE: 1.3 E9/L (ref 1.5–4)
LYMPHOCYTES RELATIVE PERCENT: 20.6 % (ref 20–42)
MCH RBC QN AUTO: 29.6 PG (ref 26–35)
MCHC RBC AUTO-ENTMCNC: 34.2 % (ref 32–34.5)
MCV RBC AUTO: 86.6 FL (ref 80–99.9)
MONOCYTES ABSOLUTE: 0.53 E9/L (ref 0.1–0.95)
MONOCYTES RELATIVE PERCENT: 8.4 % (ref 2–12)
NEUTROPHILS ABSOLUTE: 4.16 E9/L (ref 1.8–7.3)
NEUTROPHILS RELATIVE PERCENT: 66.1 % (ref 43–80)
PDW BLD-RTO: 14.2 FL (ref 11.5–15)
PLATELET # BLD: 137 E9/L (ref 130–450)
PMV BLD AUTO: 10.7 FL (ref 7–12)
POTASSIUM SERPL-SCNC: 4 MMOL/L (ref 3.5–5)
RBC # BLD: 3.95 E12/L (ref 3.8–5.8)
SODIUM BLD-SCNC: 140 MMOL/L (ref 132–146)
T4 TOTAL: 5.4 MCG/DL (ref 4.5–11.7)
TOTAL PROTEIN: 7.2 G/DL (ref 6.4–8.3)
TRIGL SERPL-MCNC: 437 MG/DL (ref 0–149)
TSH SERPL DL<=0.05 MIU/L-ACNC: 0.81 UIU/ML (ref 0.27–4.2)
VLDLC SERPL CALC-MCNC: ABNORMAL MG/DL
WBC # BLD: 6.3 E9/L (ref 4.5–11.5)

## 2023-01-06 PROCEDURE — 99214 OFFICE O/P EST MOD 30 MIN: CPT | Performed by: FAMILY MEDICINE

## 2023-01-06 PROCEDURE — 3079F DIAST BP 80-89 MM HG: CPT | Performed by: FAMILY MEDICINE

## 2023-01-06 PROCEDURE — 3075F SYST BP GE 130 - 139MM HG: CPT | Performed by: FAMILY MEDICINE

## 2023-01-06 RX ORDER — CLONIDINE HYDROCHLORIDE 0.1 MG/1
0.1 TABLET ORAL 2 TIMES DAILY
Qty: 60 TABLET | Refills: 3 | Status: SHIPPED | OUTPATIENT
Start: 2023-01-06

## 2023-01-06 SDOH — ECONOMIC STABILITY: FOOD INSECURITY: WITHIN THE PAST 12 MONTHS, THE FOOD YOU BOUGHT JUST DIDN'T LAST AND YOU DIDN'T HAVE MONEY TO GET MORE.: NEVER TRUE

## 2023-01-06 SDOH — ECONOMIC STABILITY: FOOD INSECURITY: WITHIN THE PAST 12 MONTHS, YOU WORRIED THAT YOUR FOOD WOULD RUN OUT BEFORE YOU GOT MONEY TO BUY MORE.: NEVER TRUE

## 2023-01-06 ASSESSMENT — PATIENT HEALTH QUESTIONNAIRE - PHQ9
SUM OF ALL RESPONSES TO PHQ QUESTIONS 1-9: 0
1. LITTLE INTEREST OR PLEASURE IN DOING THINGS: 0
2. FEELING DOWN, DEPRESSED OR HOPELESS: 0
SUM OF ALL RESPONSES TO PHQ QUESTIONS 1-9: 0
SUM OF ALL RESPONSES TO PHQ QUESTIONS 1-9: 0
SUM OF ALL RESPONSES TO PHQ9 QUESTIONS 1 & 2: 0
SUM OF ALL RESPONSES TO PHQ QUESTIONS 1-9: 0

## 2023-01-06 ASSESSMENT — SOCIAL DETERMINANTS OF HEALTH (SDOH): HOW HARD IS IT FOR YOU TO PAY FOR THE VERY BASICS LIKE FOOD, HOUSING, MEDICAL CARE, AND HEATING?: NOT HARD AT ALL

## 2023-01-06 NOTE — PROGRESS NOTES
23     Dorie Meyer    : 1965 Sex: male   Age: 65 y.o. Chief Complaint   Patient presents with    Diabetes     Did not get labs done         Prior to Admission medications    Medication Sig Start Date End Date Taking? Authorizing Provider   cloNIDine (CATAPRES) 0.1 MG tablet Take 1 tablet by mouth 2 times daily 23  Yes Sonya Franco DO   empagliflozin (JARDIANCE) 25 MG tablet 1 qd 22  Yes Sonya Franco DO   metoprolol succinate (TOPROL XL) 50 MG extended release tablet 1 q12 22  Yes Sonya Franco DO   allopurinol (ZYLOPRIM) 300 MG tablet 1 qd 10/31/22  Yes Sonya Franco DO   atorvastatin (LIPITOR) 40 MG tablet Take 1 tablet by mouth nightly 10/31/22  Yes Sonya Franco DO   benazepril-hydrochlorthiazide (LOTENSIN HCT) 20-12.5 MG per tablet Take 1 tablet by mouth daily  Patient taking differently: Take 1 tablet by mouth in the morning and at bedtime 10/31/22  Yes Sonya Franco DO   glipiZIDE (GLUCOTROL XL) 5 MG extended release tablet Take 1 tablet by mouth daily  Patient taking differently: 5 mg daily 2 twice a day 10/31/22  Yes Sonya Franco DO   furosemide (LASIX) 40 MG tablet Take 1 tablet by mouth daily 10/31/22  Yes Sonya Franco DO   isosorbide dinitrate (ISORDIL) 20 MG tablet 2 tablets 2 times a day  Patient taking differently: 2 tablets 3 times a day 10/31/22  Yes Sonya Franco DO   Cholecalciferol (VITAMIN D3) 50 MCG (2000 UT) CAPS Take by mouth   Yes Historical Provider, MD   ONE TOUCH LANCETS MISC Test one time daily. Uses mini glucometer   Yes Historical Provider, MD   blood glucose test strips (ASCENSIA AUTODISC VI;ONE TOUCH ULTRA TEST VI) strip Test one time daily. Uses One Touch ultra   Yes Historical Provider, MD          HPI: Jason Hummel is in today following up on hypertensive disease that remains very labile. Addition of clonidine 0.1 mg twice a day today along with his regular meds and then reassess in 2 weeks. Chronic kidney disease following with nephrology meds as prescribed. Malignant neoplasm left testicle and will be undergoing lymph node dissection in [place] which should ultimately result in cure. Awaiting further evaluation out of [place]. Review of Systems   Constitutional: Negative. HENT: Negative. Eyes: Negative. Respiratory: Negative. Gastrointestinal: Negative. Endocrine: Negative. Genitourinary: Negative. Musculoskeletal: Negative. Skin: Negative. Allergic/Immunologic: Negative. Neurological: Negative. Hematological: Negative. Psychiatric/Behavioral: Negative. Today systems review overall stable aside from blood pressure lability. Current Outpatient Medications:     cloNIDine (CATAPRES) 0.1 MG tablet, Take 1 tablet by mouth 2 times daily, Disp: 60 tablet, Rfl: 3    empagliflozin (JARDIANCE) 25 MG tablet, 1 qd, Disp: 30 tablet, Rfl: 1    metoprolol succinate (TOPROL XL) 50 MG extended release tablet, 1 q12, Disp: 180 tablet, Rfl: 3    allopurinol (ZYLOPRIM) 300 MG tablet, 1 qd, Disp: 90 tablet, Rfl: 2    atorvastatin (LIPITOR) 40 MG tablet, Take 1 tablet by mouth nightly, Disp: 90 tablet, Rfl: 3    benazepril-hydrochlorthiazide (LOTENSIN HCT) 20-12.5 MG per tablet, Take 1 tablet by mouth daily (Patient taking differently: Take 1 tablet by mouth in the morning and at bedtime), Disp: 90 tablet, Rfl: 3    glipiZIDE (GLUCOTROL XL) 5 MG extended release tablet, Take 1 tablet by mouth daily (Patient taking differently: 5 mg daily 2 twice a day), Disp: 90 tablet, Rfl: 3    furosemide (LASIX) 40 MG tablet, Take 1 tablet by mouth daily, Disp: 30 tablet, Rfl: 5    isosorbide dinitrate (ISORDIL) 20 MG tablet, 2 tablets 2 times a day (Patient taking differently: 2 tablets 3 times a day), Disp: 360 tablet, Rfl: 3    Cholecalciferol (VITAMIN D3) 50 MCG (2000 UT) CAPS, Take by mouth, Disp: , Rfl:     ONE TOUCH LANCETS MISC, Test one time daily.   Uses mini glucometer, Disp: , Rfl:     blood glucose test strips (ASCENSIA AUTODISC VI;ONE TOUCH ULTRA TEST VI) strip, Test one time daily.  Uses One Touch ultra, Disp: , Rfl:     No Known Allergies    Social History     Tobacco Use    Smoking status: Never    Smokeless tobacco: Never   Substance Use Topics    Alcohol use: Yes     Alcohol/week: 2.0 standard drinks     Types: 2 Cans of beer per week    Drug use: No      Past Surgical History:   Procedure Laterality Date    CT BIOPSY RENAL  11/2/2021    CT BIOPSY RENAL 11/2/2021 Jeremi Nichols II, MD [facility]    TONSILLECTOMY      VITRECTOMY Right 8/16/2021    PARS PLANA VITRECTOMY PAN RETINAL PHOTOAGULATION LASER GAS FLUID EXCHANGE RIGHT EYE performed by Panfilo Vee MD at [facility] OR     Family History   Problem Relation Age of Onset    High Blood Pressure Mother     High Cholesterol Mother     High Blood Pressure Father     Breast Cancer Sister     Hypertension Brother     Hypertension Sister     No Known Problems Brother      Past Medical History:   Diagnosis Date    Cardiomyopathy (HCC)     CHF (congestive heart failure) (HCC)     CKD (chronic kidney disease)     HTN (hypertension)     Hypercholesterolemia     Medically noncompliant     Non-insulin dependent type 2 diabetes mellitus (HCC)     Obesity        Vitals:    01/06/23 1041   BP: 136/86   Pulse: 58   Temp: 97.7 °F (36.5 °C)   SpO2: 98%   Weight: 199 lb (90.3 kg)   Height: 5' 4\" (1.626 m)     BP Readings from Last 3 Encounters:   01/06/23 136/86   12/06/22 (!) 158/80   10/31/22 138/88    166/80    Physical Exam  Vitals and nursing note reviewed.   Constitutional:       Appearance: He is well-developed.   HENT:      Head: Normocephalic.      Right Ear: External ear normal.      Left Ear: External ear normal.      Nose: Nose normal.   Eyes:      Conjunctiva/sclera: Conjunctivae normal.      Pupils: Pupils are equal, round, and reactive to light.   Cardiovascular:      Rate and Rhythm: Normal rate.  Pulmonary:      Breath sounds: Normal breath sounds. Abdominal:      General: Bowel sounds are normal.      Palpations: Abdomen is soft. Musculoskeletal:         General: Normal range of motion. Cervical back: Normal range of motion and neck supple. Skin:     General: Skin is warm and dry. Neurological:      Mental Status: He is alert and oriented to person, place, and time. Psychiatric:         Behavior: Behavior normal.   Today's exam findings overall stable aside from blood pressure lability. Systolic elevation as noted. Recommendations for clonidine as prescribed. Lab studies today and follow-up with me in the next 2 weeks. Plan Per Assessment:  Samir Liao was seen today for diabetes. Diagnoses and all orders for this visit:    Essential hypertension    Type 2 diabetes mellitus with stage 3b chronic kidney disease, without long-term current use of insulin (HCC)    Stage 4 chronic kidney disease (HCC)    Primary osteoarthritis of both knees    Malignant neoplasm of left testis, unspecified whether descended or undescended [facility])    Personal history of testicular cancer    Other orders  -     cloNIDine (CATAPRES) 0.1 MG tablet; Take 1 tablet by mouth 2 times daily          Return in about 2 weeks (around 1/20/2023). Ariella Schilling DO    Note was generated with the assistance of voice recognition software. Document was reviewed however may contain grammatical errors.

## 2023-01-20 ENCOUNTER — OFFICE VISIT (OUTPATIENT)
Dept: PRIMARY CARE CLINIC | Age: 58
End: 2023-01-20

## 2023-01-20 VITALS
BODY MASS INDEX: 34.16 KG/M2 | HEART RATE: 55 BPM | OXYGEN SATURATION: 96 % | TEMPERATURE: 98.2 F | HEIGHT: 64 IN | DIASTOLIC BLOOD PRESSURE: 82 MMHG | SYSTOLIC BLOOD PRESSURE: 136 MMHG

## 2023-01-20 DIAGNOSIS — I42.8 NONISCHEMIC CARDIOMYOPATHY (HCC): ICD-10-CM

## 2023-01-20 DIAGNOSIS — C62.92 MALIGNANT NEOPLASM OF LEFT TESTIS, UNSPECIFIED WHETHER DESCENDED OR UNDESCENDED (HCC): ICD-10-CM

## 2023-01-20 DIAGNOSIS — Z85.47 PERSONAL HISTORY OF TESTICULAR CANCER: Primary | ICD-10-CM

## 2023-01-20 DIAGNOSIS — I10 ESSENTIAL HYPERTENSION: ICD-10-CM

## 2023-01-20 NOTE — PROGRESS NOTES
23     [facility]    : 1965 Sex: male   Age: 65 y.o. Chief Complaint   Patient presents with    Diabetes       Prior to Admission medications    Medication Sig Start Date End Date Taking? Authorizing Provider   cloNIDine (CATAPRES) 0.1 MG tablet Take 1 tablet by mouth 2 times daily 23  Yes Rosy Franco, DO   empagliflozin (JARDIANCE) 25 MG tablet 1 qd 22  Yes Rosy Franco, DO   metoprolol succinate (TOPROL XL) 50 MG extended release tablet 1 q12 22  Yes Rosy Franco, DO   allopurinol (ZYLOPRIM) 300 MG tablet 1 qd 10/31/22  Yes Rosy Franco, DO   atorvastatin (LIPITOR) 40 MG tablet Take 1 tablet by mouth nightly 10/31/22  Yes Rosy Franco, DO   benazepril-hydrochlorthiazide (LOTENSIN HCT) 20-12.5 MG per tablet Take 1 tablet by mouth daily  Patient taking differently: Take 1 tablet by mouth in the morning and at bedtime 10/31/22  Yes Rosy Franco, DO   glipiZIDE (GLUCOTROL XL) 5 MG extended release tablet Take 1 tablet by mouth daily  Patient taking differently: 5 mg daily 2 twice a day 10/31/22  Yes Rosy Franco, DO   furosemide (LASIX) 40 MG tablet Take 1 tablet by mouth daily 10/31/22  Yes Rosy Franco, DO   isosorbide dinitrate (ISORDIL) 20 MG tablet 2 tablets 2 times a day  Patient taking differently: 2 tablets 3 times a day 10/31/22  Yes Rosy Franco, DO   Cholecalciferol (VITAMIN D3) 50 MCG ( UT) CAPS Take by mouth   Yes Historical Provider, MD   ONE TOUCH LANCETS MISC Test one time daily. Uses mini glucometer   Yes Historical Provider, MD   blood glucose test strips (ASCENSIA AUTODISC VI;ONE TOUCH ULTRA TEST VI) strip Test one time daily. Uses One Touch ultra   Yes Historical Provider, MD          HPI: Benjamin Matthews presents today in medical follow-up has a history of testicular cancer and recommendations for lymph node dissection.   He is currently following with Dr. Gordon Perez first name Emerald Hutchinson out of [facility].  Has an appointment 9 February for continued work-up and then arrangements for procedure. Blood pressure presently improved with the addition of clonidine. I will maintain at present dosing. Recommending follow-up with Dr. Kristopher Canas as well for additional assistance on meds. Diabetes remains controlled but slight increase in A1c and encouraged tighter control with diet and exercise. Review of Systems   Constitutional: Negative. HENT: Negative. Eyes: Negative. Respiratory: Negative. Gastrointestinal: Negative. Endocrine: Negative. Genitourinary: Negative. Musculoskeletal: Negative. Skin: Negative. Allergic/Immunologic: Negative. Neurological: Negative. Hematological: Negative. Psychiatric/Behavioral: Negative. Today systems review is stable medications as prescribed.         Current Outpatient Medications:     cloNIDine (CATAPRES) 0.1 MG tablet, Take 1 tablet by mouth 2 times daily, Disp: 60 tablet, Rfl: 3    empagliflozin (JARDIANCE) 25 MG tablet, 1 qd, Disp: 30 tablet, Rfl: 1    metoprolol succinate (TOPROL XL) 50 MG extended release tablet, 1 q12, Disp: 180 tablet, Rfl: 3    allopurinol (ZYLOPRIM) 300 MG tablet, 1 qd, Disp: 90 tablet, Rfl: 2    atorvastatin (LIPITOR) 40 MG tablet, Take 1 tablet by mouth nightly, Disp: 90 tablet, Rfl: 3    benazepril-hydrochlorthiazide (LOTENSIN HCT) 20-12.5 MG per tablet, Take 1 tablet by mouth daily (Patient taking differently: Take 1 tablet by mouth in the morning and at bedtime), Disp: 90 tablet, Rfl: 3    glipiZIDE (GLUCOTROL XL) 5 MG extended release tablet, Take 1 tablet by mouth daily (Patient taking differently: 5 mg daily 2 twice a day), Disp: 90 tablet, Rfl: 3    furosemide (LASIX) 40 MG tablet, Take 1 tablet by mouth daily, Disp: 30 tablet, Rfl: 5    isosorbide dinitrate (ISORDIL) 20 MG tablet, 2 tablets 2 times a day (Patient taking differently: 2 tablets 3 times a day), Disp: 360 tablet, Rfl: 3 Cholecalciferol (VITAMIN D3) 50 MCG (2000 UT) CAPS, Take by mouth, Disp: , Rfl:     ONE TOUCH LANCETS MISC, Test one time daily. Uses mini glucometer, Disp: , Rfl:     blood glucose test strips (ASCENSIA AUTODISC VI;ONE TOUCH ULTRA TEST VI) strip, Test one time daily. Uses One Touch ultra, Disp: , Rfl:     No Known Allergies    Social History     Tobacco Use    Smoking status: Never    Smokeless tobacco: Never   Substance Use Topics    Alcohol use: Yes     Alcohol/week: 2.0 standard drinks     Types: 2 Cans of beer per week    Drug use: No      Past Surgical History:   Procedure Laterality Date    CT BIOPSY RENAL  11/2/2021    CT BIOPSY RENAL 11/2/2021 Kevin Skelton MD [facility]    TONSILLECTOMY      VITRECTOMY Right 8/16/2021    PARS PLANA VITRECTOMY PAN RETINAL PHOTOAGULATION LASER GAS FLUID EXCHANGE RIGHT EYE performed by Reyes Espinoza MD at [address] [place] [address]     Family History   Problem Relation Age of Onset    High Blood Pressure Mother     High Cholesterol Mother     High Blood Pressure Father     Breast Cancer Sister     Hypertension Brother     Hypertension Sister     No Known Problems Brother      Past Medical History:   Diagnosis Date    Cardiomyopathy ([facility]     CHF (congestive heart failure) ([facility]     CKD (chronic kidney disease)     HTN (hypertension)     Hypercholesterolemia     Medically noncompliant     Non-insulin dependent type 2 diabetes mellitus ([facility]     Obesity        Vitals:    01/20/23 1158   BP: 136/82   Pulse: 55   Temp: 98.2 °F (36.8 °C)   SpO2: 96%   Height: 5' 4\" (1.626 m)     BP Readings from Last 3 Encounters:   01/20/23 136/82   01/06/23 136/86   12/06/22 (!) 158/80    148/80    Physical Exam  Vitals and nursing note reviewed. Constitutional:       Appearance: He is well-developed. HENT:      Head: Normocephalic.       Right Ear: External ear normal.      Left Ear: External ear normal.      Nose: Nose normal.   Eyes:      Conjunctiva/sclera: Conjunctivae normal. Pupils: Pupils are equal, round, and reactive to light. Cardiovascular:      Rate and Rhythm: Normal rate. Pulmonary:      Breath sounds: Normal breath sounds. Abdominal:      General: Bowel sounds are normal.      Palpations: Abdomen is soft. Musculoskeletal:         General: Normal range of motion. Cervical back: Normal range of motion and neck supple. Skin:     General: Skin is warm and dry. Neurological:      Mental Status: He is alert and oriented to person, place, and time. Psychiatric:         Behavior: Behavior normal.      Vitals physical examination stable. I will maintain present meds and care. Follow-up visit with me 4 weeks and sooner if problems. Continue follow-up with urology and oncology. Proper follow-up with nephrology. Plan Per Assessment:  Mattie Cerna was seen today for diabetes. Diagnoses and all orders for this visit:    Personal history of testicular cancer    Malignant neoplasm of left testis, unspecified whether descended or undescended [facility])    Essential hypertension    Nonischemic cardiomyopathy ([address]          Return in about 4 weeks (around 2/17/2023). Gabriel Seals DO    Note was generated with the assistance of voice recognition software. Document was reviewed however may contain grammatical errors.

## 2023-02-17 ENCOUNTER — OFFICE VISIT (OUTPATIENT)
Dept: PRIMARY CARE CLINIC | Age: 58
End: 2023-02-17
Payer: COMMERCIAL

## 2023-02-17 VITALS
WEIGHT: 199 LBS | TEMPERATURE: 98.5 F | BODY MASS INDEX: 33.97 KG/M2 | SYSTOLIC BLOOD PRESSURE: 128 MMHG | HEIGHT: 64 IN | OXYGEN SATURATION: 98 % | DIASTOLIC BLOOD PRESSURE: 84 MMHG | HEART RATE: 52 BPM

## 2023-02-17 DIAGNOSIS — N18.32 TYPE 2 DIABETES MELLITUS WITH STAGE 3B CHRONIC KIDNEY DISEASE, WITHOUT LONG-TERM CURRENT USE OF INSULIN (HCC): ICD-10-CM

## 2023-02-17 DIAGNOSIS — E11.22 TYPE 2 DIABETES MELLITUS WITH STAGE 3B CHRONIC KIDNEY DISEASE, WITHOUT LONG-TERM CURRENT USE OF INSULIN (HCC): ICD-10-CM

## 2023-02-17 DIAGNOSIS — I10 ESSENTIAL HYPERTENSION: ICD-10-CM

## 2023-02-17 DIAGNOSIS — C62.92 MALIGNANT NEOPLASM OF LEFT TESTIS, UNSPECIFIED WHETHER DESCENDED OR UNDESCENDED (HCC): Primary | ICD-10-CM

## 2023-02-17 DIAGNOSIS — E78.5 HYPERLIPIDEMIA, UNSPECIFIED HYPERLIPIDEMIA TYPE: ICD-10-CM

## 2023-02-17 PROCEDURE — 3079F DIAST BP 80-89 MM HG: CPT | Performed by: FAMILY MEDICINE

## 2023-02-17 PROCEDURE — 3074F SYST BP LT 130 MM HG: CPT | Performed by: FAMILY MEDICINE

## 2023-02-17 PROCEDURE — 3052F HG A1C>EQUAL 8.0%<EQUAL 9.0%: CPT | Performed by: FAMILY MEDICINE

## 2023-02-17 PROCEDURE — 99214 OFFICE O/P EST MOD 30 MIN: CPT | Performed by: FAMILY MEDICINE

## 2023-02-17 RX ORDER — BLOOD-GLUCOSE METER
1 KIT MISCELLANEOUS DAILY
Qty: 1 KIT | Refills: 0 | Status: SHIPPED | OUTPATIENT
Start: 2023-02-17

## 2023-02-17 RX ORDER — GLIPIZIDE 5 MG/1
5 TABLET, FILM COATED, EXTENDED RELEASE ORAL 2 TIMES DAILY
Qty: 180 TABLET | Refills: 1 | Status: SHIPPED | OUTPATIENT
Start: 2023-02-17

## 2023-02-17 ASSESSMENT — ENCOUNTER SYMPTOMS
ALLERGIC/IMMUNOLOGIC NEGATIVE: 1
EYES NEGATIVE: 1
RESPIRATORY NEGATIVE: 1
GASTROINTESTINAL NEGATIVE: 1

## 2023-02-17 NOTE — PROGRESS NOTES
23     Lanie Hayes    : 1965 Sex: male   Age: 65 y.o. Chief Complaint   Patient presents with    Medication Refill     Pt here for refills. Discuss Medications       Prior to Admission medications    Medication Sig Start Date End Date Taking? Authorizing Provider   glipiZIDE (GLUCOTROL XL) 5 MG extended release tablet Take 1 tablet by mouth 2 times daily 2 twice a day 23  Yes Remedios Franco DO   glucose monitoring (FREESTYLE FREEDOM) kit 1 kit by Does not apply route daily 23  Yes Remedios Franco DO   empagliflozin (JARDIANCE) 25 MG tablet Take 1 tablet by mouth daily 23  Yes Remedios Franco DO   cloNIDine (CATAPRES) 0.1 MG tablet Take 1 tablet by mouth 2 times daily 23  Yes Remedios Franco DO   metoprolol succinate (TOPROL XL) 50 MG extended release tablet 1 q12 22  Yes Remedios Franco DO   allopurinol (ZYLOPRIM) 300 MG tablet 1 qd 10/31/22  Yes Remedios Franco DO   atorvastatin (LIPITOR) 40 MG tablet Take 1 tablet by mouth nightly 10/31/22  Yes Remedios Franco DO   benazepril-hydrochlorthiazide (LOTENSIN HCT) 20-12.5 MG per tablet Take 1 tablet by mouth daily  Patient taking differently: Take 1 tablet by mouth in the morning and at bedtime 10/31/22  Yes Remedios Franco DO   furosemide (LASIX) 40 MG tablet Take 1 tablet by mouth daily 10/31/22  Yes Remedios Franco DO   isosorbide dinitrate (ISORDIL) 20 MG tablet 2 tablets 2 times a day  Patient taking differently: 2 tablets 3 times a day 10/31/22  Yes Remedios Franco DO   Cholecalciferol (VITAMIN D3) 50 MCG ( UT) CAPS Take by mouth   Yes Historical Provider, MD   ONE TOUCH LANCETS MISC Test one time daily. Uses mini glucometer   Yes Historical Provider, MD   blood glucose test strips (ASCENSIA AUTODISC VI;ONE TOUCH ULTRA TEST VI) strip Test one time daily.   Uses One Touch ultra   Yes Historical Provider, MD          HPI: Evaluated today hypertension diabetes hyperlipidemia all of which is presently stable. Meds seem to be well-tolerated and we will continue. He is following up with nephrology first part of March and will see me shortly after. Work-up for testicular cancer presently stable and plans. Lymph node dissection in April. Review of Systems   Constitutional: Negative. HENT: Negative. Eyes: Negative. Respiratory: Negative. Gastrointestinal: Negative. Endocrine: Negative. Genitourinary: Negative. Musculoskeletal: Negative. Skin: Negative. Allergic/Immunologic: Negative. Neurological: Negative. Hematological: Negative. Psychiatric/Behavioral: Negative. Today systems review overall stable medications as prescribed.         Current Outpatient Medications:     glipiZIDE (GLUCOTROL XL) 5 MG extended release tablet, Take 1 tablet by mouth 2 times daily 2 twice a day, Disp: 180 tablet, Rfl: 1    glucose monitoring (FREESTYLE FREEDOM) kit, 1 kit by Does not apply route daily, Disp: 1 kit, Rfl: 0    empagliflozin (JARDIANCE) 25 MG tablet, Take 1 tablet by mouth daily, Disp: 30 tablet, Rfl: 5    cloNIDine (CATAPRES) 0.1 MG tablet, Take 1 tablet by mouth 2 times daily, Disp: 60 tablet, Rfl: 3    metoprolol succinate (TOPROL XL) 50 MG extended release tablet, 1 q12, Disp: 180 tablet, Rfl: 3    allopurinol (ZYLOPRIM) 300 MG tablet, 1 qd, Disp: 90 tablet, Rfl: 2    atorvastatin (LIPITOR) 40 MG tablet, Take 1 tablet by mouth nightly, Disp: 90 tablet, Rfl: 3    benazepril-hydrochlorthiazide (LOTENSIN HCT) 20-12.5 MG per tablet, Take 1 tablet by mouth daily (Patient taking differently: Take 1 tablet by mouth in the morning and at bedtime), Disp: 90 tablet, Rfl: 3    furosemide (LASIX) 40 MG tablet, Take 1 tablet by mouth daily, Disp: 30 tablet, Rfl: 5    isosorbide dinitrate (ISORDIL) 20 MG tablet, 2 tablets 2 times a day (Patient taking differently: 2 tablets 3 times a day), Disp: 360 tablet, Rfl: 3    Cholecalciferol (VITAMIN D3) 50 MCG (2000 UT) CAPS, Take by mouth, Disp: , Rfl:     ONE TOUCH LANCETS MISC, Test one time daily. Uses mini glucometer, Disp: , Rfl:     blood glucose test strips (ASCENSIA AUTODISC VI;ONE TOUCH ULTRA TEST VI) strip, Test one time daily. Uses One Touch ultra, Disp: , Rfl:     No Known Allergies    Social History     Tobacco Use    Smoking status: Never    Smokeless tobacco: Never   Substance Use Topics    Alcohol use: Yes     Alcohol/week: 2.0 standard drinks     Types: 2 Cans of beer per week    Drug use: No      Past Surgical History:   Procedure Laterality Date    CT BIOPSY RENAL  11/2/2021    CT BIOPSY RENAL 11/2/2021 Bertram Moore MD [facility]    TONSILLECTOMY      VITRECTOMY Right 8/16/2021    PARS PLANA VITRECTOMY PAN RETINAL PHOTOAGULATION LASER GAS FLUID EXCHANGE RIGHT EYE performed by Ibis Rosas MD at [address] [place] [address]     Family History   Problem Relation Age of Onset    High Blood Pressure Mother     High Cholesterol Mother     High Blood Pressure Father     Breast Cancer Sister     Hypertension Brother     Hypertension Sister     No Known Problems Brother      Past Medical History:   Diagnosis Date    Cardiomyopathy ([facility] [address]     CHF (congestive heart failure) ([facility]     CKD (chronic kidney disease)     HTN (hypertension)     Hypercholesterolemia     Medically noncompliant     Non-insulin dependent type 2 diabetes mellitus ([facility]     Obesity        Vitals:    02/17/23 1103   BP: 128/84   Pulse: 52   Temp: 98.5 °F (36.9 °C)   SpO2: 98%   Weight: 199 lb (90.3 kg)   Height: 5' 4\" (1.626 m)     BP Readings from Last 3 Encounters:   02/17/23 128/84   01/20/23 136/82   01/06/23 136/86    152/82    Physical Exam  Vitals and nursing note reviewed. Constitutional:       Appearance: He is well-developed. HENT:      Head: Normocephalic.       Right Ear: External ear normal.      Left Ear: External ear normal.      Nose: Nose normal.   Eyes:      Conjunctiva/sclera: Conjunctivae normal.      Pupils: Pupils are equal, round, and reactive to light. Cardiovascular:      Rate and Rhythm: Normal rate. Pulmonary:      Breath sounds: Normal breath sounds. Abdominal:      General: Bowel sounds are normal.      Palpations: Abdomen is soft. Musculoskeletal:         General: Normal range of motion. Cervical back: Normal range of motion and neck supple. Skin:     General: Skin is warm and dry. Neurological:      Mental Status: He is alert and oriented to person, place, and time. Psychiatric:         Behavior: Behavior normal.      Today's vitals physical exam stable. Heart is regular lungs clear. Medications as prescribed. Follow-up visit with me 1 month sooner if problems. Maintain present meds and care. A1c at 8.1 and continue dietary exercise program present meds. Plan Per Assessment:  Art Duff was seen today for medication refill and discuss medications. Diagnoses and all orders for this visit:    Malignant neoplasm of left testis, unspecified whether descended or undescended [facility])    Essential hypertension    Type 2 diabetes mellitus with stage 3b chronic kidney disease, without long-term current use of insulin ([facility])    Hyperlipidemia, unspecified hyperlipidemia type    Other orders  -     glipiZIDE (GLUCOTROL XL) 5 MG extended release tablet; Take 1 tablet by mouth 2 times daily 2 twice a day  -     glucose monitoring (FREESTYLE FREEDOM) kit; 1 kit by Does not apply route daily          No follow-ups on file. Candelario Soliz DO    Note was generated with the assistance of voice recognition software. Document was reviewed however may contain grammatical errors.

## 2023-04-19 ENCOUNTER — OFFICE VISIT (OUTPATIENT)
Dept: PRIMARY CARE CLINIC | Age: 58
End: 2023-04-19
Payer: COMMERCIAL

## 2023-04-19 VITALS
SYSTOLIC BLOOD PRESSURE: 142 MMHG | OXYGEN SATURATION: 98 % | DIASTOLIC BLOOD PRESSURE: 62 MMHG | HEART RATE: 74 BPM | WEIGHT: 195 LBS | BODY MASS INDEX: 33.47 KG/M2

## 2023-04-19 DIAGNOSIS — C62.92 MALIGNANT NEOPLASM OF LEFT TESTIS, UNSPECIFIED WHETHER DESCENDED OR UNDESCENDED (HCC): Primary | ICD-10-CM

## 2023-04-19 DIAGNOSIS — I10 ESSENTIAL HYPERTENSION: ICD-10-CM

## 2023-04-19 DIAGNOSIS — N18.4 STAGE 4 CHRONIC KIDNEY DISEASE (HCC): ICD-10-CM

## 2023-04-19 PROCEDURE — 99214 OFFICE O/P EST MOD 30 MIN: CPT | Performed by: FAMILY MEDICINE

## 2023-04-19 PROCEDURE — 3078F DIAST BP <80 MM HG: CPT | Performed by: FAMILY MEDICINE

## 2023-04-19 PROCEDURE — 3077F SYST BP >= 140 MM HG: CPT | Performed by: FAMILY MEDICINE

## 2023-04-19 SDOH — ECONOMIC STABILITY: FOOD INSECURITY: WITHIN THE PAST 12 MONTHS, THE FOOD YOU BOUGHT JUST DIDN'T LAST AND YOU DIDN'T HAVE MONEY TO GET MORE.: NEVER TRUE

## 2023-04-19 SDOH — ECONOMIC STABILITY: HOUSING INSECURITY
IN THE LAST 12 MONTHS, WAS THERE A TIME WHEN YOU DID NOT HAVE A STEADY PLACE TO SLEEP OR SLEPT IN A SHELTER (INCLUDING NOW)?: NO

## 2023-04-19 SDOH — ECONOMIC STABILITY: FOOD INSECURITY: WITHIN THE PAST 12 MONTHS, YOU WORRIED THAT YOUR FOOD WOULD RUN OUT BEFORE YOU GOT MONEY TO BUY MORE.: NEVER TRUE

## 2023-04-19 SDOH — ECONOMIC STABILITY: INCOME INSECURITY: HOW HARD IS IT FOR YOU TO PAY FOR THE VERY BASICS LIKE FOOD, HOUSING, MEDICAL CARE, AND HEATING?: NOT HARD AT ALL

## 2023-04-19 NOTE — PROGRESS NOTES
23     Elie Sims    : 1965 Sex: male   Age: 65 y.o. Chief Complaint   Patient presents with    Post-op Problem     BP has been fluctuating since surgery has been hard to regulate        Prior to Admission medications    Medication Sig Start Date End Date Taking? Authorizing Provider   glipiZIDE (GLUCOTROL XL) 5 MG extended release tablet Take 1 tablet by mouth 2 times daily 2 twice a day 23  Yes Fely Franco DO   glucose monitoring (FREESTYLE FREEDOM) kit 1 kit by Does not apply route daily 23  Yes Fely Franco DO   empagliflozin (JARDIANCE) 25 MG tablet Take 1 tablet by mouth daily 23  Yes Fely Franco DO   cloNIDine (CATAPRES) 0.1 MG tablet Take 1 tablet by mouth 2 times daily 23  Yes Fely Franco DO   metoprolol succinate (TOPROL XL) 50 MG extended release tablet 1 q12 22  Yes Fely Franco DO   allopurinol (ZYLOPRIM) 300 MG tablet 1 qd 10/31/22  Yes Fely Franco DO   atorvastatin (LIPITOR) 40 MG tablet Take 1 tablet by mouth nightly 10/31/22  Yes Feyl Franco DO   benazepril-hydrochlorthiazide (LOTENSIN HCT) 20-12.5 MG per tablet Take 1 tablet by mouth daily  Patient taking differently: Take 1 tablet by mouth in the morning and at bedtime 10/31/22  Yes Fely Franco DO   furosemide (LASIX) 40 MG tablet Take 1 tablet by mouth daily 10/31/22  Yes Fely Franco DO   isosorbide dinitrate (ISORDIL) 20 MG tablet 2 tablets 2 times a day  Patient taking differently: in the morning and at bedtime 10/31/22  Yes Fely Franco DO   Cholecalciferol (VITAMIN D3) 50 MCG ( UT) CAPS Take by mouth   Yes Historical Provider, MD   ONE TOUCH LANCETS MISC Test one time daily. Uses mini glucometer   Yes Historical Provider, MD   blood glucose test strips (ASCENSIA AUTODISC VI;ONE TOUCH ULTRA TEST VI) strip Test one time daily.   Uses One Touch ultra   Yes Historical Provider, MD          HPI: Patient evaluated today

## 2023-05-01 RX ORDER — FUROSEMIDE 40 MG/1
40 TABLET ORAL DAILY
Qty: 30 TABLET | Refills: 5 | Status: SHIPPED | OUTPATIENT
Start: 2023-05-01

## 2023-05-22 DIAGNOSIS — C62.92 MALIGNANT NEOPLASM OF LEFT TESTIS, UNSPECIFIED WHETHER DESCENDED OR UNDESCENDED (HCC): ICD-10-CM

## 2023-05-22 DIAGNOSIS — N18.4 STAGE 4 CHRONIC KIDNEY DISEASE (HCC): ICD-10-CM

## 2023-05-22 DIAGNOSIS — I10 ESSENTIAL HYPERTENSION: ICD-10-CM

## 2023-05-22 LAB
ALBUMIN SERPL-MCNC: 4.3 G/DL (ref 3.5–5.2)
ALP SERPL-CCNC: 110 U/L (ref 40–129)
ALT SERPL-CCNC: 15 U/L (ref 0–40)
ANION GAP SERPL CALCULATED.3IONS-SCNC: 13 MMOL/L (ref 7–16)
AST SERPL-CCNC: 22 U/L (ref 0–39)
BASOPHILS # BLD: 0.04 E9/L (ref 0–0.2)
BASOPHILS NFR BLD: 0.7 % (ref 0–2)
BILIRUB SERPL-MCNC: 0.2 MG/DL (ref 0–1.2)
BUN SERPL-MCNC: 67 MG/DL (ref 6–20)
CALCIUM SERPL-MCNC: 9.3 MG/DL (ref 8.6–10.2)
CHLORIDE SERPL-SCNC: 108 MMOL/L (ref 98–107)
CHOLESTEROL, TOTAL: 137 MG/DL (ref 0–199)
CO2 SERPL-SCNC: 20 MMOL/L (ref 22–29)
CREAT SERPL-MCNC: 2.6 MG/DL (ref 0.7–1.2)
EOSINOPHIL # BLD: 0.05 E9/L (ref 0.05–0.5)
EOSINOPHIL NFR BLD: 0.9 % (ref 0–6)
ERYTHROCYTE [DISTWIDTH] IN BLOOD BY AUTOMATED COUNT: 13.4 FL (ref 11.5–15)
GLUCOSE SERPL-MCNC: 131 MG/DL (ref 74–99)
HCT VFR BLD AUTO: 30.9 % (ref 37–54)
HDLC SERPL-MCNC: 27 MG/DL
HGB BLD-MCNC: 10.2 G/DL (ref 12.5–16.5)
IMM GRANULOCYTES # BLD: 0.01 E9/L
IMM GRANULOCYTES NFR BLD: 0.2 % (ref 0–5)
LDLC SERPL CALC-MCNC: 60 MG/DL (ref 0–99)
LYMPHOCYTES # BLD: 1.1 E9/L (ref 1.5–4)
LYMPHOCYTES NFR BLD: 19.9 % (ref 20–42)
MCH RBC QN AUTO: 30.7 PG (ref 26–35)
MCHC RBC AUTO-ENTMCNC: 33 % (ref 32–34.5)
MCV RBC AUTO: 93.1 FL (ref 80–99.9)
MONOCYTES # BLD: 0.48 E9/L (ref 0.1–0.95)
MONOCYTES NFR BLD: 8.7 % (ref 2–12)
NEUTROPHILS # BLD: 3.84 E9/L (ref 1.8–7.3)
NEUTS SEG NFR BLD: 69.6 % (ref 43–80)
PLATELET # BLD AUTO: 129 E9/L (ref 130–450)
PMV BLD AUTO: 11 FL (ref 7–12)
POTASSIUM SERPL-SCNC: 4.1 MMOL/L (ref 3.5–5)
PROT SERPL-MCNC: 7.1 G/DL (ref 6.4–8.3)
RBC # BLD AUTO: 3.32 E12/L (ref 3.8–5.8)
SODIUM SERPL-SCNC: 141 MMOL/L (ref 132–146)
T4 SERPL-MCNC: 5.4 MCG/DL (ref 4.5–11.7)
TRIGL SERPL-MCNC: 249 MG/DL (ref 0–149)
TSH SERPL-MCNC: 1.15 UIU/ML (ref 0.27–4.2)
VLDLC SERPL CALC-MCNC: 50 MG/DL
WBC # BLD: 5.5 E9/L (ref 4.5–11.5)

## 2023-05-23 LAB — HBA1C MFR BLD: 7 % (ref 4–5.6)

## 2023-05-24 ENCOUNTER — OFFICE VISIT (OUTPATIENT)
Dept: PRIMARY CARE CLINIC | Age: 58
End: 2023-05-24
Payer: COMMERCIAL

## 2023-05-24 VITALS
HEART RATE: 50 BPM | WEIGHT: 194 LBS | BODY MASS INDEX: 33.3 KG/M2 | TEMPERATURE: 98 F | SYSTOLIC BLOOD PRESSURE: 124 MMHG | DIASTOLIC BLOOD PRESSURE: 82 MMHG | OXYGEN SATURATION: 95 %

## 2023-05-24 DIAGNOSIS — C62.92 MALIGNANT NEOPLASM OF LEFT TESTIS, UNSPECIFIED WHETHER DESCENDED OR UNDESCENDED (HCC): ICD-10-CM

## 2023-05-24 DIAGNOSIS — E78.5 HYPERLIPIDEMIA, UNSPECIFIED HYPERLIPIDEMIA TYPE: ICD-10-CM

## 2023-05-24 DIAGNOSIS — I10 ESSENTIAL HYPERTENSION: Primary | ICD-10-CM

## 2023-05-24 DIAGNOSIS — N18.32 TYPE 2 DIABETES MELLITUS WITH STAGE 3B CHRONIC KIDNEY DISEASE, WITHOUT LONG-TERM CURRENT USE OF INSULIN (HCC): ICD-10-CM

## 2023-05-24 DIAGNOSIS — E11.22 TYPE 2 DIABETES MELLITUS WITH STAGE 3B CHRONIC KIDNEY DISEASE, WITHOUT LONG-TERM CURRENT USE OF INSULIN (HCC): ICD-10-CM

## 2023-05-24 PROCEDURE — 99214 OFFICE O/P EST MOD 30 MIN: CPT | Performed by: FAMILY MEDICINE

## 2023-05-24 PROCEDURE — 3051F HG A1C>EQUAL 7.0%<8.0%: CPT | Performed by: FAMILY MEDICINE

## 2023-05-24 PROCEDURE — 3079F DIAST BP 80-89 MM HG: CPT | Performed by: FAMILY MEDICINE

## 2023-05-24 PROCEDURE — 3074F SYST BP LT 130 MM HG: CPT | Performed by: FAMILY MEDICINE

## 2023-05-24 NOTE — PROGRESS NOTES
04/19/23 (!) 142/62   02/17/23 128/84    146/78    Physical Exam  Vitals and nursing note reviewed. Constitutional:       Appearance: He is well-developed. HENT:      Head: Normocephalic. Right Ear: External ear normal.      Left Ear: External ear normal.      Nose: Nose normal.   Eyes:      Conjunctiva/sclera: Conjunctivae normal.      Pupils: Pupils are equal, round, and reactive to light. Cardiovascular:      Rate and Rhythm: Normal rate. Pulmonary:      Breath sounds: Normal breath sounds. Abdominal:      General: Bowel sounds are normal.      Palpations: Abdomen is soft. Musculoskeletal:         General: Normal range of motion. Cervical back: Normal range of motion and neck supple. Skin:     General: Skin is warm and dry. Neurological:      Mental Status: He is alert and oriented to person, place, and time. Psychiatric:         Behavior: Behavior normal.      Today's vitals physical exam stable. Heart is regular lungs are clear. Medications as prescribed. Follow-up visit with me 3 months and sooner if problems blood work at that time. He will be seen in [facility] in June. He will also be seen by nephrology in June. Plan Per Assessment:  Eleazar Ash was seen today for discuss labs. Diagnoses and all orders for this visit:    Essential hypertension  -     CBC with Auto Differential; Future  -     Comprehensive Metabolic Panel; Future  -     Hemoglobin A1C; Future  -     Lipid Panel; Future  -     T4; Future  -     TSH; Future    Type 2 diabetes mellitus with stage 3b chronic kidney disease, without long-term current use of insulin (HCC)  -     CBC with Auto Differential; Future  -     Comprehensive Metabolic Panel; Future  -     Hemoglobin A1C; Future  -     Lipid Panel; Future  -     T4; Future  -     TSH;  Future    Malignant neoplasm of left testis, unspecified whether descended or undescended ([facility] [place] [address]    Hyperlipidemia, unspecified hyperlipidemia type  -     CBC with

## 2023-05-31 RX ORDER — CLONIDINE HYDROCHLORIDE 0.1 MG/1
0.1 TABLET ORAL 2 TIMES DAILY
Qty: 60 TABLET | Refills: 3 | Status: SHIPPED | OUTPATIENT
Start: 2023-05-31

## 2023-06-26 RX ORDER — GLIPIZIDE 5 MG/1
5 TABLET, FILM COATED, EXTENDED RELEASE ORAL 2 TIMES DAILY
Qty: 60 TABLET | Refills: 5 | Status: SHIPPED | OUTPATIENT
Start: 2023-06-26

## 2023-08-30 DIAGNOSIS — E11.22 TYPE 2 DIABETES MELLITUS WITH STAGE 3B CHRONIC KIDNEY DISEASE, WITHOUT LONG-TERM CURRENT USE OF INSULIN (HCC): ICD-10-CM

## 2023-08-30 DIAGNOSIS — I10 ESSENTIAL HYPERTENSION: ICD-10-CM

## 2023-08-30 DIAGNOSIS — E78.5 HYPERLIPIDEMIA, UNSPECIFIED HYPERLIPIDEMIA TYPE: ICD-10-CM

## 2023-08-30 DIAGNOSIS — N18.32 TYPE 2 DIABETES MELLITUS WITH STAGE 3B CHRONIC KIDNEY DISEASE, WITHOUT LONG-TERM CURRENT USE OF INSULIN (HCC): ICD-10-CM

## 2023-08-30 LAB
ABSOLUTE IMMATURE GRANULOCYTE: <0.03 K/UL (ref 0–0.58)
ALBUMIN SERPL-MCNC: 4.5 G/DL (ref 3.5–5.2)
ALP BLD-CCNC: 116 U/L (ref 40–129)
ALT SERPL-CCNC: 24 U/L (ref 0–40)
ANION GAP SERPL CALCULATED.3IONS-SCNC: 19 MMOL/L (ref 7–16)
AST SERPL-CCNC: 22 U/L (ref 0–39)
BASOPHILS ABSOLUTE: 0.05 K/UL (ref 0–0.2)
BASOPHILS RELATIVE PERCENT: 1 % (ref 0–2)
BILIRUB SERPL-MCNC: 0.5 MG/DL (ref 0–1.2)
BUN BLDV-MCNC: 74 MG/DL (ref 6–20)
CALCIUM SERPL-MCNC: 9.8 MG/DL (ref 8.6–10.2)
CHLORIDE BLD-SCNC: 103 MMOL/L (ref 98–107)
CHOLESTEROL: 164 MG/DL
CO2: 20 MMOL/L (ref 22–29)
CREAT SERPL-MCNC: 2.9 MG/DL (ref 0.7–1.2)
EOSINOPHILS ABSOLUTE: 0.23 K/UL (ref 0.05–0.5)
EOSINOPHILS RELATIVE PERCENT: 4 % (ref 0–6)
GFR SERPL CREATININE-BSD FRML MDRD: 25 ML/MIN/1.73M2
GLUCOSE BLD-MCNC: 196 MG/DL (ref 74–99)
HBA1C MFR BLD: 9.1 % (ref 4–5.6)
HCT VFR BLD CALC: 33.1 % (ref 37–54)
HDLC SERPL-MCNC: 28 MG/DL
HEMOGLOBIN: 10.7 G/DL (ref 12.5–16.5)
IMMATURE GRANULOCYTES: 0 % (ref 0–5)
LDL CHOLESTEROL: 68 MG/DL
LYMPHOCYTES ABSOLUTE: 1.06 K/UL (ref 1.5–4)
LYMPHOCYTES RELATIVE PERCENT: 19 % (ref 20–42)
MCH RBC QN AUTO: 30 PG (ref 26–35)
MCHC RBC AUTO-ENTMCNC: 32.3 G/DL (ref 32–34.5)
MCV RBC AUTO: 92.7 FL (ref 80–99.9)
MONOCYTES ABSOLUTE: 0.46 K/UL (ref 0.1–0.95)
MONOCYTES RELATIVE PERCENT: 8 % (ref 2–12)
NEUTROPHILS ABSOLUTE: 3.64 K/UL (ref 1.8–7.3)
NEUTROPHILS RELATIVE PERCENT: 67 % (ref 43–80)
PDW BLD-RTO: 14 % (ref 11.5–15)
PLATELET # BLD: 132 K/UL (ref 130–450)
PMV BLD AUTO: 10.9 FL (ref 7–12)
POTASSIUM SERPL-SCNC: 4.5 MMOL/L (ref 3.5–5)
RBC # BLD: 3.57 M/UL (ref 3.8–5.8)
SODIUM BLD-SCNC: 142 MMOL/L (ref 132–146)
T4 TOTAL: 6.4 UG/DL (ref 4.5–11.7)
TOTAL PROTEIN: 7.7 G/DL (ref 6.4–8.3)
TRIGL SERPL-MCNC: 342 MG/DL
TSH SERPL DL<=0.05 MIU/L-ACNC: 1.08 UIU/ML (ref 0.27–4.2)
VLDLC SERPL CALC-MCNC: 68 MG/DL
WBC # BLD: 5.5 K/UL (ref 4.5–11.5)

## 2023-09-01 ENCOUNTER — OFFICE VISIT (OUTPATIENT)
Dept: PRIMARY CARE CLINIC | Age: 58
End: 2023-09-01
Payer: COMMERCIAL

## 2023-09-01 VITALS
HEIGHT: 64 IN | OXYGEN SATURATION: 98 % | BODY MASS INDEX: 33.12 KG/M2 | WEIGHT: 194 LBS | TEMPERATURE: 98 F | HEART RATE: 71 BPM | DIASTOLIC BLOOD PRESSURE: 62 MMHG | SYSTOLIC BLOOD PRESSURE: 122 MMHG

## 2023-09-01 DIAGNOSIS — E11.22 TYPE 2 DIABETES MELLITUS WITH STAGE 3B CHRONIC KIDNEY DISEASE, WITHOUT LONG-TERM CURRENT USE OF INSULIN (HCC): ICD-10-CM

## 2023-09-01 DIAGNOSIS — K59.00 CONSTIPATION, UNSPECIFIED CONSTIPATION TYPE: ICD-10-CM

## 2023-09-01 DIAGNOSIS — I10 ESSENTIAL HYPERTENSION: Primary | ICD-10-CM

## 2023-09-01 DIAGNOSIS — N18.4 STAGE 4 CHRONIC KIDNEY DISEASE (HCC): ICD-10-CM

## 2023-09-01 DIAGNOSIS — I42.8 NONISCHEMIC CARDIOMYOPATHY (HCC): ICD-10-CM

## 2023-09-01 DIAGNOSIS — N18.32 TYPE 2 DIABETES MELLITUS WITH STAGE 3B CHRONIC KIDNEY DISEASE, WITHOUT LONG-TERM CURRENT USE OF INSULIN (HCC): ICD-10-CM

## 2023-09-01 PROCEDURE — 99214 OFFICE O/P EST MOD 30 MIN: CPT | Performed by: FAMILY MEDICINE

## 2023-09-01 PROCEDURE — 3074F SYST BP LT 130 MM HG: CPT | Performed by: FAMILY MEDICINE

## 2023-09-01 PROCEDURE — 3046F HEMOGLOBIN A1C LEVEL >9.0%: CPT | Performed by: FAMILY MEDICINE

## 2023-09-01 PROCEDURE — 3078F DIAST BP <80 MM HG: CPT | Performed by: FAMILY MEDICINE

## 2023-09-01 RX ORDER — BUMETANIDE 0.5 MG/1
0.5 TABLET ORAL DAILY
Qty: 30 TABLET | Refills: 3 | Status: SHIPPED | OUTPATIENT
Start: 2023-09-01

## 2023-09-01 RX ORDER — SODIUM BICARBONATE 650 MG/1
TABLET ORAL
COMMUNITY
Start: 2023-08-29

## 2023-09-01 RX ORDER — AMLODIPINE BESYLATE 5 MG/1
TABLET ORAL
COMMUNITY
Start: 2023-08-29

## 2023-09-01 NOTE — PROGRESS NOTES
23     Chanda Do    : 1965 Sex: male   Age: 65 y.o. Chief Complaint   Patient presents with    Hypertension       Prior to Admission medications    Medication Sig Start Date End Date Taking? Authorizing Provider   sodium bicarbonate 650 MG tablet  23  Yes Historical Provider, MD   amLODIPine (NORVASC) 5 MG tablet  23  Yes Historical Provider, MD   bumetanide (BUMEX) 0.5 MG tablet Take 1 tablet by mouth daily 23  Yes Danica Franco, DO   empagliflozin (JARDIANCE) 25 MG tablet Take 1 tablet by mouth daily 23  Yes Danica Franco DO   glipiZIDE (GLUCOTROL XL) 5 MG extended release tablet Take 1 tablet by mouth 2 times daily 23  Yes Danica Franco, DO   cloNIDine (CATAPRES) 0.1 MG tablet Take 1 tablet by mouth 2 times daily 23  Yes Dulce Hutchins, DO   Multiple Vitamins-Minerals (MULTI COMPLETE) CAPS Take by mouth   Yes Historical Provider, MD   glucose monitoring (FREESTYLE FREEDOM) kit 1 kit by Does not apply route daily 23  Yes Danica Franco DO   metoprolol succinate (TOPROL XL) 50 MG extended release tablet 1 q12 22  Yes Danica Franco DO   allopurinol (ZYLOPRIM) 300 MG tablet 1 qd 10/31/22  Yes Danica Franco DO   atorvastatin (LIPITOR) 40 MG tablet Take 1 tablet by mouth nightly 10/31/22  Yes Danica Franco DO   benazepril-hydrochlorthiazide (LOTENSIN HCT) 20-12.5 MG per tablet Take 1 tablet by mouth daily 10/31/22  Yes Danica Franco DO   isosorbide dinitrate (ISORDIL) 20 MG tablet 2 tablets 2 times a day  Patient taking differently: daily 10/31/22  Yes Danica Franco DO   Cholecalciferol (VITAMIN D3) 50 MCG ( UT) CAPS Take by mouth   Yes Historical Provider, MD   ONE TOUCH LANCETS MISC Test one time daily. Uses mini glucometer   Yes Historical Provider, MD   blood glucose test strips (ASCENSIA AUTODISC VI;ONE TOUCH ULTRA TEST VI) strip Test one time daily.   Uses One Touch ultra   Yes Historical

## 2023-09-15 RX ORDER — ATORVASTATIN CALCIUM 40 MG/1
40 TABLET, FILM COATED ORAL NIGHTLY
Qty: 90 TABLET | Refills: 3 | Status: SHIPPED | OUTPATIENT
Start: 2023-09-15

## 2023-10-03 RX ORDER — CLONIDINE HYDROCHLORIDE 0.1 MG/1
0.1 TABLET ORAL 2 TIMES DAILY
Qty: 60 TABLET | Refills: 3 | Status: SHIPPED | OUTPATIENT
Start: 2023-10-03

## 2023-10-23 ENCOUNTER — OFFICE VISIT (OUTPATIENT)
Dept: PRIMARY CARE CLINIC | Age: 58
End: 2023-10-23
Payer: COMMERCIAL

## 2023-10-23 VITALS
BODY MASS INDEX: 33.12 KG/M2 | HEART RATE: 66 BPM | DIASTOLIC BLOOD PRESSURE: 70 MMHG | WEIGHT: 194 LBS | OXYGEN SATURATION: 98 % | SYSTOLIC BLOOD PRESSURE: 128 MMHG | HEIGHT: 64 IN | TEMPERATURE: 97.7 F

## 2023-10-23 DIAGNOSIS — N18.32 TYPE 2 DIABETES MELLITUS WITH STAGE 3B CHRONIC KIDNEY DISEASE, WITHOUT LONG-TERM CURRENT USE OF INSULIN (HCC): ICD-10-CM

## 2023-10-23 DIAGNOSIS — E79.0 HYPERURICEMIA: ICD-10-CM

## 2023-10-23 DIAGNOSIS — I10 ESSENTIAL HYPERTENSION: Primary | ICD-10-CM

## 2023-10-23 DIAGNOSIS — C62.92 MALIGNANT NEOPLASM OF LEFT TESTIS, UNSPECIFIED WHETHER DESCENDED OR UNDESCENDED (HCC): ICD-10-CM

## 2023-10-23 DIAGNOSIS — I25.10 CORONARY ARTERY DISEASE INVOLVING NATIVE CORONARY ARTERY OF NATIVE HEART WITHOUT ANGINA PECTORIS: ICD-10-CM

## 2023-10-23 DIAGNOSIS — E78.5 HYPERLIPIDEMIA, UNSPECIFIED HYPERLIPIDEMIA TYPE: ICD-10-CM

## 2023-10-23 DIAGNOSIS — N18.4 STAGE 4 CHRONIC KIDNEY DISEASE (HCC): ICD-10-CM

## 2023-10-23 DIAGNOSIS — E11.22 TYPE 2 DIABETES MELLITUS WITH STAGE 3B CHRONIC KIDNEY DISEASE, WITHOUT LONG-TERM CURRENT USE OF INSULIN (HCC): ICD-10-CM

## 2023-10-23 PROCEDURE — 99214 OFFICE O/P EST MOD 30 MIN: CPT | Performed by: FAMILY MEDICINE

## 2023-10-23 PROCEDURE — 3078F DIAST BP <80 MM HG: CPT | Performed by: FAMILY MEDICINE

## 2023-10-23 PROCEDURE — 3074F SYST BP LT 130 MM HG: CPT | Performed by: FAMILY MEDICINE

## 2023-10-23 PROCEDURE — 3046F HEMOGLOBIN A1C LEVEL >9.0%: CPT | Performed by: FAMILY MEDICINE

## 2023-10-23 RX ORDER — ALLOPURINOL 300 MG/1
TABLET ORAL
Qty: 90 TABLET | Refills: 2 | Status: SHIPPED | OUTPATIENT
Start: 2023-10-23

## 2023-10-23 ASSESSMENT — ENCOUNTER SYMPTOMS
RESPIRATORY NEGATIVE: 1
ALLERGIC/IMMUNOLOGIC NEGATIVE: 1
EYES NEGATIVE: 1
GASTROINTESTINAL NEGATIVE: 1

## 2023-10-23 NOTE — PROGRESS NOTES
10/20/2023    MCH 30.7 10/20/2023    MCHC 33.0 10/20/2023    RDW 12.7 10/20/2023     Lab Results   Component Value Date     10/20/2023    K 4.6 10/20/2023     10/20/2023    CO2 21 (L) 10/20/2023    BUN 56 (H) 10/20/2023    CREATININE 2.7 (H) 10/20/2023    GLUCOSE 141 (H) 10/20/2023    CALCIUM 9.6 10/20/2023    PROT 7.4 10/20/2023    LABALBU 4.4 10/20/2023    BILITOT 0.5 10/20/2023    ALKPHOS 112 10/20/2023    AST 24 10/20/2023    ALT 23 10/20/2023    LABGLOM 27 (L) 10/20/2023    GFRAA 34 09/23/2022      No results found for: \"PSA\", \"PSADIA\"   Lab Results   Component Value Date    LABA1C 9.1 (H) 08/30/2023    LABA1C 7.0 (H) 05/22/2023    LABA1C 8.1 (H) 01/06/2023     No results found for: \"EAG\"            Plan Per Assessment:  Otis Healy was seen today for hypertension and discuss medications. Diagnoses and all orders for this visit:    Essential hypertension    Type 2 diabetes mellitus with stage 3b chronic kidney disease, without long-term current use of insulin (HCC)    Stage 4 chronic kidney disease (HCC)    Malignant neoplasm of left testis, unspecified whether descended or undescended ([address])    Hyperlipidemia, unspecified hyperlipidemia type    Coronary artery disease involving native coronary artery of native heart without angina pectoris            No follow-ups on file. Ana Flores DO    Note was generated with the assistance of voice recognition software. Document was reviewed however may contain grammatical errors.

## 2023-11-07 RX ORDER — ISOSORBIDE DINITRATE 20 MG/1
40 TABLET ORAL 2 TIMES DAILY
Qty: 360 TABLET | Refills: 1 | Status: SHIPPED | OUTPATIENT
Start: 2023-11-07

## 2023-11-07 RX ORDER — METOPROLOL SUCCINATE 50 MG/1
50 TABLET, EXTENDED RELEASE ORAL DAILY
Qty: 90 TABLET | Refills: 1 | Status: SHIPPED | OUTPATIENT
Start: 2023-11-07

## 2023-12-05 ENCOUNTER — TELEPHONE (OUTPATIENT)
Dept: PRIMARY CARE CLINIC | Age: 58
End: 2023-12-05

## 2023-12-05 ENCOUNTER — OFFICE VISIT (OUTPATIENT)
Dept: FAMILY MEDICINE CLINIC | Age: 58
End: 2023-12-05
Payer: COMMERCIAL

## 2023-12-05 VITALS
WEIGHT: 193.8 LBS | DIASTOLIC BLOOD PRESSURE: 72 MMHG | RESPIRATION RATE: 17 BRPM | BODY MASS INDEX: 33.27 KG/M2 | SYSTOLIC BLOOD PRESSURE: 130 MMHG | OXYGEN SATURATION: 97 % | HEART RATE: 59 BPM | TEMPERATURE: 97.8 F

## 2023-12-05 DIAGNOSIS — I10 ESSENTIAL HYPERTENSION: ICD-10-CM

## 2023-12-05 DIAGNOSIS — R05.9 COUGH, UNSPECIFIED TYPE: ICD-10-CM

## 2023-12-05 DIAGNOSIS — N18.4 STAGE 4 CHRONIC KIDNEY DISEASE (HCC): ICD-10-CM

## 2023-12-05 DIAGNOSIS — J02.9 SORE THROAT: Primary | ICD-10-CM

## 2023-12-05 DIAGNOSIS — R09.81 NASAL CONGESTION: ICD-10-CM

## 2023-12-05 LAB
INFLUENZA A ANTIBODY: NEGATIVE
INFLUENZA B ANTIBODY: NEGATIVE
Lab: ABNORMAL
PERFORMING INSTRUMENT: ABNORMAL
QC PASS/FAIL: ABNORMAL
S PYO AG THROAT QL: POSITIVE
SARS-COV-2, POC: DETECTED

## 2023-12-05 PROCEDURE — 99214 OFFICE O/P EST MOD 30 MIN: CPT | Performed by: FAMILY MEDICINE

## 2023-12-05 PROCEDURE — 3078F DIAST BP <80 MM HG: CPT | Performed by: FAMILY MEDICINE

## 2023-12-05 PROCEDURE — 87880 STREP A ASSAY W/OPTIC: CPT | Performed by: FAMILY MEDICINE

## 2023-12-05 PROCEDURE — 3075F SYST BP GE 130 - 139MM HG: CPT | Performed by: FAMILY MEDICINE

## 2023-12-05 PROCEDURE — 87804 INFLUENZA ASSAY W/OPTIC: CPT | Performed by: FAMILY MEDICINE

## 2023-12-05 PROCEDURE — 87426 SARSCOV CORONAVIRUS AG IA: CPT | Performed by: FAMILY MEDICINE

## 2023-12-05 RX ORDER — AZITHROMYCIN 250 MG/1
TABLET, FILM COATED ORAL
Qty: 1 PACKET | Refills: 0 | Status: SHIPPED | OUTPATIENT
Start: 2023-12-05

## 2023-12-05 ASSESSMENT — PATIENT HEALTH QUESTIONNAIRE - PHQ9
SUM OF ALL RESPONSES TO PHQ QUESTIONS 1-9: 0
SUM OF ALL RESPONSES TO PHQ QUESTIONS 1-9: 0
2. FEELING DOWN, DEPRESSED OR HOPELESS: 0
SUM OF ALL RESPONSES TO PHQ QUESTIONS 1-9: 0
1. LITTLE INTEREST OR PLEASURE IN DOING THINGS: 0
SUM OF ALL RESPONSES TO PHQ9 QUESTIONS 1 & 2: 0
SUM OF ALL RESPONSES TO PHQ QUESTIONS 1-9: 0

## 2023-12-05 ASSESSMENT — ENCOUNTER SYMPTOMS
RESPIRATORY NEGATIVE: 1
SORE THROAT: 1
EYES NEGATIVE: 1
GASTROINTESTINAL NEGATIVE: 1
ALLERGIC/IMMUNOLOGIC NEGATIVE: 1

## 2023-12-05 NOTE — TELEPHONE ENCOUNTER
Pt calling in stating he has been having sinus issues and lots of fatigue. Pt wants to know if you can send anything to the pharmacy for him.

## 2024-01-04 PROBLEM — J02.9 SORE THROAT: Status: RESOLVED | Noted: 2023-12-05 | Resolved: 2024-01-04

## 2024-01-04 PROBLEM — R05.9 COUGH: Status: RESOLVED | Noted: 2023-12-05 | Resolved: 2024-01-04

## 2024-01-09 LAB — DIABETIC RETINOPATHY: POSITIVE

## 2024-01-09 RX ORDER — BUMETANIDE 0.5 MG/1
0.5 TABLET ORAL DAILY
Qty: 30 TABLET | Refills: 3 | Status: SHIPPED | OUTPATIENT
Start: 2024-01-09

## 2024-01-19 LAB
25(OH)D3 SERPL-MCNC: 34.7 NG/ML (ref 30–100)
ALBUMIN SERPL-MCNC: 4.4 G/DL (ref 3.5–5.2)
ALP SERPL-CCNC: 104 U/L (ref 40–129)
ALT SERPL-CCNC: 23 U/L (ref 0–40)
ANION GAP SERPL CALCULATED.3IONS-SCNC: 12 MMOL/L (ref 7–16)
AST SERPL-CCNC: 25 U/L (ref 0–39)
BACTERIA URNS QL MICRO: ABNORMAL
BASOPHILS # BLD: 0.06 K/UL (ref 0–0.2)
BASOPHILS NFR BLD: 1 % (ref 0–2)
BILIRUB SERPL-MCNC: 0.4 MG/DL (ref 0–1.2)
BILIRUB UR QL STRIP: NEGATIVE
BUN SERPL-MCNC: 58 MG/DL (ref 6–20)
CALCIUM SERPL-MCNC: 9.5 MG/DL (ref 8.6–10.2)
CHLORIDE SERPL-SCNC: 105 MMOL/L (ref 98–107)
CLARITY UR: CLEAR
CO2 SERPL-SCNC: 23 MMOL/L (ref 22–29)
COLOR UR: YELLOW
CREAT SERPL-MCNC: 2.4 MG/DL (ref 0.7–1.2)
CREAT UR-MCNC: 41.7 MG/DL (ref 40–278)
CREAT UR-MCNC: 42 MG/DL (ref 40–278)
EOSINOPHIL # BLD: 0 K/UL (ref 0.05–0.5)
EOSINOPHILS RELATIVE PERCENT: 0 % (ref 0–6)
ERYTHROCYTE [DISTWIDTH] IN BLOOD BY AUTOMATED COUNT: 14.2 % (ref 11.5–15)
FERRITIN SERPL-MCNC: 284 NG/ML
GFR SERPL CREATININE-BSD FRML MDRD: 31 ML/MIN/1.73M2
GLUCOSE SERPL-MCNC: 141 MG/DL (ref 74–99)
GLUCOSE UR STRIP-MCNC: >=1000 MG/DL
HCT VFR BLD AUTO: 32.5 % (ref 37–54)
HGB BLD-MCNC: 10.8 G/DL (ref 12.5–16.5)
HGB UR QL STRIP.AUTO: ABNORMAL
IMM GRANULOCYTES # BLD AUTO: <0.03 K/UL (ref 0–0.58)
IMM GRANULOCYTES NFR BLD: 0 % (ref 0–5)
IRON SATN MFR SERPL: 30 % (ref 20–55)
IRON SERPL-MCNC: 79 UG/DL (ref 59–158)
KETONES UR STRIP-MCNC: NEGATIVE MG/DL
LEUKOCYTE ESTERASE UR QL STRIP: NEGATIVE
LYMPHOCYTES NFR BLD: 1.08 K/UL (ref 1.5–4)
LYMPHOCYTES RELATIVE PERCENT: 21 % (ref 20–42)
MAGNESIUM SERPL-MCNC: 2.3 MG/DL (ref 1.6–2.6)
MCH RBC QN AUTO: 30.9 PG (ref 26–35)
MCHC RBC AUTO-ENTMCNC: 33.2 G/DL (ref 32–34.5)
MCV RBC AUTO: 92.9 FL (ref 80–99.9)
MONOCYTES NFR BLD: 0.4 K/UL (ref 0.1–0.95)
MONOCYTES NFR BLD: 8 % (ref 2–12)
NEUTROPHILS NFR BLD: 70 % (ref 43–80)
NEUTS SEG NFR BLD: 3.67 K/UL (ref 1.8–7.3)
NITRITE UR QL STRIP: NEGATIVE
PH UR STRIP: 5.5 [PH] (ref 5–9)
PHOSPHATE SERPL-MCNC: 3.8 MG/DL (ref 2.5–4.5)
PLATELET # BLD AUTO: 123 K/UL (ref 130–450)
PMV BLD AUTO: 10.5 FL (ref 7–12)
POTASSIUM SERPL-SCNC: 4.4 MMOL/L (ref 3.5–5)
PROT SERPL-MCNC: 7.2 G/DL (ref 6.4–8.3)
PROT UR STRIP-MCNC: 100 MG/DL
RBC # BLD AUTO: 3.5 M/UL (ref 3.8–5.8)
RBC #/AREA URNS HPF: ABNORMAL /HPF
SODIUM SERPL-SCNC: 140 MMOL/L (ref 132–146)
SP GR UR STRIP: 1.01 (ref 1–1.03)
TIBC SERPL-MCNC: 263 UG/DL (ref 250–450)
TOTAL PROTEIN, URINE: 73 MG/DL (ref 0–12)
URATE SERPL-MCNC: 4.9 MG/DL (ref 3.4–7)
URINE TOTAL PROTEIN CREATININE RATIO: 1.75 (ref 0–0.2)
UROBILINOGEN UR STRIP-ACNC: 0.2 EU/DL (ref 0–1)
WBC #/AREA URNS HPF: ABNORMAL /HPF
WBC OTHER # BLD: 5.2 K/UL (ref 4.5–11.5)

## 2024-01-20 LAB — PTH-INTACT SERPL-MCNC: 87.6 PG/ML (ref 15–65)

## 2024-03-11 RX ORDER — CLONIDINE HYDROCHLORIDE 0.1 MG/1
0.1 TABLET ORAL 2 TIMES DAILY
Qty: 60 TABLET | Refills: 3 | Status: SHIPPED | OUTPATIENT
Start: 2024-03-11

## 2024-03-13 ENCOUNTER — TELEPHONE (OUTPATIENT)
Dept: PRIMARY CARE CLINIC | Age: 59
End: 2024-03-13

## 2024-03-13 NOTE — TELEPHONE ENCOUNTER
SONNY-Patient stopped in-was just at eye care and they told him he has shingles on his eye. They treated him there.

## 2024-05-07 RX ORDER — BENAZEPRIL HYDROCHLORIDE AND HYDROCHLOROTHIAZIDE 20; 12.5 MG/1; MG/1
1 TABLET ORAL DAILY
Qty: 30 TABLET | Refills: 0 | Status: SHIPPED | OUTPATIENT
Start: 2024-05-07

## 2024-05-07 RX ORDER — METOPROLOL SUCCINATE 50 MG/1
50 TABLET, EXTENDED RELEASE ORAL DAILY
Qty: 30 TABLET | Refills: 0 | Status: SHIPPED | OUTPATIENT
Start: 2024-05-07

## 2024-05-07 RX ORDER — BUMETANIDE 0.5 MG/1
0.5 TABLET ORAL DAILY
Qty: 30 TABLET | Refills: 0 | Status: SHIPPED | OUTPATIENT
Start: 2024-05-07

## 2024-05-07 RX ORDER — CLONIDINE HYDROCHLORIDE 0.1 MG/1
0.1 TABLET ORAL 2 TIMES DAILY
Qty: 60 TABLET | Refills: 0 | Status: SHIPPED | OUTPATIENT
Start: 2024-05-07

## 2024-05-07 RX ORDER — ISOSORBIDE DINITRATE 20 MG/1
40 TABLET ORAL 2 TIMES DAILY
Qty: 60 TABLET | Refills: 0 | Status: SHIPPED | OUTPATIENT
Start: 2024-05-07

## 2024-05-07 RX ORDER — ATORVASTATIN CALCIUM 40 MG/1
40 TABLET, FILM COATED ORAL NIGHTLY
Qty: 30 TABLET | Refills: 0 | Status: SHIPPED | OUTPATIENT
Start: 2024-05-07

## 2024-05-07 RX ORDER — ALLOPURINOL 300 MG/1
TABLET ORAL
Qty: 30 TABLET | Refills: 0 | Status: SHIPPED | OUTPATIENT
Start: 2024-05-07

## 2024-05-07 RX ORDER — GLIPIZIDE 5 MG/1
5 TABLET, FILM COATED, EXTENDED RELEASE ORAL 2 TIMES DAILY
Qty: 60 TABLET | Refills: 0 | Status: SHIPPED | OUTPATIENT
Start: 2024-05-07

## 2024-05-15 ENCOUNTER — OFFICE VISIT (OUTPATIENT)
Dept: PRIMARY CARE CLINIC | Age: 59
End: 2024-05-15
Payer: COMMERCIAL

## 2024-05-15 VITALS
HEIGHT: 64 IN | HEART RATE: 62 BPM | TEMPERATURE: 98.7 F | SYSTOLIC BLOOD PRESSURE: 128 MMHG | OXYGEN SATURATION: 97 % | DIASTOLIC BLOOD PRESSURE: 70 MMHG | BODY MASS INDEX: 33.12 KG/M2 | WEIGHT: 194 LBS

## 2024-05-15 DIAGNOSIS — I10 ESSENTIAL HYPERTENSION: Primary | ICD-10-CM

## 2024-05-15 DIAGNOSIS — N18.32 TYPE 2 DIABETES MELLITUS WITH STAGE 3B CHRONIC KIDNEY DISEASE, WITHOUT LONG-TERM CURRENT USE OF INSULIN (HCC): ICD-10-CM

## 2024-05-15 DIAGNOSIS — E11.22 TYPE 2 DIABETES MELLITUS WITH STAGE 3B CHRONIC KIDNEY DISEASE, WITHOUT LONG-TERM CURRENT USE OF INSULIN (HCC): ICD-10-CM

## 2024-05-15 DIAGNOSIS — E78.5 HYPERLIPIDEMIA, UNSPECIFIED HYPERLIPIDEMIA TYPE: ICD-10-CM

## 2024-05-15 DIAGNOSIS — N18.4 STAGE 4 CHRONIC KIDNEY DISEASE (HCC): ICD-10-CM

## 2024-05-15 PROCEDURE — 93000 ELECTROCARDIOGRAM COMPLETE: CPT | Performed by: FAMILY MEDICINE

## 2024-05-15 PROCEDURE — 3078F DIAST BP <80 MM HG: CPT | Performed by: FAMILY MEDICINE

## 2024-05-15 PROCEDURE — 3074F SYST BP LT 130 MM HG: CPT | Performed by: FAMILY MEDICINE

## 2024-05-15 PROCEDURE — 99214 OFFICE O/P EST MOD 30 MIN: CPT | Performed by: FAMILY MEDICINE

## 2024-05-15 RX ORDER — AMLODIPINE BESYLATE 10 MG/1
TABLET ORAL
Qty: 90 TABLET | Refills: 3 | Status: SHIPPED | OUTPATIENT
Start: 2024-05-15

## 2024-05-15 SDOH — ECONOMIC STABILITY: FOOD INSECURITY: WITHIN THE PAST 12 MONTHS, THE FOOD YOU BOUGHT JUST DIDN'T LAST AND YOU DIDN'T HAVE MONEY TO GET MORE.: NEVER TRUE

## 2024-05-15 SDOH — ECONOMIC STABILITY: FOOD INSECURITY: WITHIN THE PAST 12 MONTHS, YOU WORRIED THAT YOUR FOOD WOULD RUN OUT BEFORE YOU GOT MONEY TO BUY MORE.: NEVER TRUE

## 2024-05-15 SDOH — ECONOMIC STABILITY: INCOME INSECURITY: HOW HARD IS IT FOR YOU TO PAY FOR THE VERY BASICS LIKE FOOD, HOUSING, MEDICAL CARE, AND HEATING?: NOT HARD AT ALL

## 2024-05-15 ASSESSMENT — PATIENT HEALTH QUESTIONNAIRE - PHQ9
SUM OF ALL RESPONSES TO PHQ QUESTIONS 1-9: 0
SUM OF ALL RESPONSES TO PHQ QUESTIONS 1-9: 0
SUM OF ALL RESPONSES TO PHQ9 QUESTIONS 1 & 2: 0
SUM OF ALL RESPONSES TO PHQ QUESTIONS 1-9: 0
1. LITTLE INTEREST OR PLEASURE IN DOING THINGS: NOT AT ALL
2. FEELING DOWN, DEPRESSED OR HOPELESS: NOT AT ALL
SUM OF ALL RESPONSES TO PHQ QUESTIONS 1-9: 0

## 2024-05-15 NOTE — PROGRESS NOTES
Provider, MD Damien          HPI: Blake is seen today with hypertension diabetes hyperlipidemia chronic kidney disease.  Blood pressure is elevated today 156/70 .  Recommended adjusting amlodipine to 10 mg and then reassess with next visit.  Remainder medications reviewed continue as prescribed.  EKG assessed and sinus bradycardia no acute changes.  Lab studies will be completed with next visit.          Review of Systems   Constitutional: Negative.    HENT: Negative.     Eyes: Negative.    Respiratory: Negative.     Gastrointestinal: Negative.    Endocrine: Negative.    Genitourinary: Negative.    Musculoskeletal: Negative.    Skin: Negative.    Allergic/Immunologic: Negative.    Neurological: Negative.    Hematological: Negative.    Psychiatric/Behavioral: Negative.        Today systems review stable meds as prescribed.        Current Outpatient Medications:     amLODIPine (NORVASC) 10 MG tablet, 1 qd, Disp: 90 tablet, Rfl: 3    benazepril-hydrochlorthiazide (LOTENSIN HCT) 20-12.5 MG per tablet, Take 1 tablet by mouth daily, Disp: 30 tablet, Rfl: 0    glipiZIDE (GLUCOTROL XL) 5 MG extended release tablet, Take 1 tablet by mouth 2 times daily, Disp: 60 tablet, Rfl: 0    atorvastatin (LIPITOR) 40 MG tablet, Take 1 tablet by mouth nightly, Disp: 30 tablet, Rfl: 0    allopurinol (ZYLOPRIM) 300 MG tablet, 1 qd, Disp: 30 tablet, Rfl: 0    metoprolol succinate (TOPROL XL) 50 MG extended release tablet, Take 1 tablet by mouth daily, Disp: 30 tablet, Rfl: 0    isosorbide dinitrate (ISORDIL) 20 MG tablet, Take 2 tablets by mouth 2 times daily, Disp: 60 tablet, Rfl: 0    bumetanide (BUMEX) 0.5 MG tablet, Take 1 tablet by mouth daily, Disp: 30 tablet, Rfl: 0    cloNIDine (CATAPRES) 0.1 MG tablet, Take 1 tablet by mouth 2 times daily, Disp: 60 tablet, Rfl: 0    empagliflozin (JARDIANCE) 25 MG tablet, Take 1 tablet by mouth daily, Disp: 30 tablet, Rfl: 5    sodium bicarbonate 650 MG tablet, , Disp: , Rfl:     Multiple

## 2024-06-10 DIAGNOSIS — E11.22 TYPE 2 DIABETES MELLITUS WITH STAGE 3B CHRONIC KIDNEY DISEASE, WITHOUT LONG-TERM CURRENT USE OF INSULIN (HCC): ICD-10-CM

## 2024-06-10 DIAGNOSIS — E78.5 HYPERLIPIDEMIA, UNSPECIFIED HYPERLIPIDEMIA TYPE: ICD-10-CM

## 2024-06-10 DIAGNOSIS — N18.4 STAGE 4 CHRONIC KIDNEY DISEASE (HCC): ICD-10-CM

## 2024-06-10 DIAGNOSIS — N18.32 TYPE 2 DIABETES MELLITUS WITH STAGE 3B CHRONIC KIDNEY DISEASE, WITHOUT LONG-TERM CURRENT USE OF INSULIN (HCC): ICD-10-CM

## 2024-06-10 DIAGNOSIS — I10 ESSENTIAL HYPERTENSION: ICD-10-CM

## 2024-06-10 LAB
ALBUMIN: 4.3 G/DL (ref 3.5–5.2)
ALP BLD-CCNC: 164 U/L (ref 40–129)
ALT SERPL-CCNC: 18 U/L (ref 0–40)
ANION GAP SERPL CALCULATED.3IONS-SCNC: 12 MMOL/L (ref 7–16)
AST SERPL-CCNC: 18 U/L (ref 0–39)
BASOPHILS ABSOLUTE: 0.04 K/UL (ref 0–0.2)
BASOPHILS RELATIVE PERCENT: 1 % (ref 0–2)
BILIRUB SERPL-MCNC: 0.3 MG/DL (ref 0–1.2)
BUN BLDV-MCNC: 61 MG/DL (ref 6–20)
CALCIUM SERPL-MCNC: 9.3 MG/DL (ref 8.6–10.2)
CHLORIDE BLD-SCNC: 106 MMOL/L (ref 98–107)
CHOLESTEROL, TOTAL: 171 MG/DL
CO2: 23 MMOL/L (ref 22–29)
CREAT SERPL-MCNC: 2.5 MG/DL (ref 0.7–1.2)
EOSINOPHILS ABSOLUTE: 0.04 K/UL (ref 0.05–0.5)
EOSINOPHILS RELATIVE PERCENT: 1 % (ref 0–6)
FOLATE: 17.4 NG/ML (ref 4.8–24.2)
GFR, ESTIMATED: 29 ML/MIN/1.73M2
GLUCOSE BLD-MCNC: 158 MG/DL (ref 74–99)
HBA1C MFR BLD: 8 % (ref 4–5.6)
HCT VFR BLD CALC: 31.9 % (ref 37–54)
HDLC SERPL-MCNC: 36 MG/DL
HEMOGLOBIN: 10.7 G/DL (ref 12.5–16.5)
IMMATURE GRANULOCYTES %: 0 % (ref 0–5)
IMMATURE GRANULOCYTES ABSOLUTE: <0.03 K/UL (ref 0–0.58)
LDL CHOLESTEROL: 61 MG/DL
LYMPHOCYTES ABSOLUTE: 1.09 K/UL (ref 1.5–4)
LYMPHOCYTES RELATIVE PERCENT: 20 % (ref 20–42)
MCH RBC QN AUTO: 31.1 PG (ref 26–35)
MCHC RBC AUTO-ENTMCNC: 33.5 G/DL (ref 32–34.5)
MCV RBC AUTO: 92.7 FL (ref 80–99.9)
MONOCYTES ABSOLUTE: 0.61 K/UL (ref 0.1–0.95)
MONOCYTES RELATIVE PERCENT: 11 % (ref 2–12)
NEUTROPHILS ABSOLUTE: 3.57 K/UL (ref 1.8–7.3)
NEUTROPHILS RELATIVE PERCENT: 67 % (ref 43–80)
PDW BLD-RTO: 13.2 % (ref 11.5–15)
PLATELET # BLD: 138 K/UL (ref 130–450)
PMV BLD AUTO: 10.2 FL (ref 7–12)
POTASSIUM SERPL-SCNC: 4.4 MMOL/L (ref 3.5–5)
RBC # BLD: 3.44 M/UL (ref 3.8–5.8)
SODIUM BLD-SCNC: 141 MMOL/L (ref 132–146)
T4 TOTAL: 6.6 UG/DL (ref 4.5–11.7)
TOTAL PROTEIN: 6.9 G/DL (ref 6.4–8.3)
TRIGL SERPL-MCNC: 370 MG/DL
TSH SERPL DL<=0.05 MIU/L-ACNC: 0.68 UIU/ML (ref 0.27–4.2)
VITAMIN B-12: 885 PG/ML (ref 211–946)
VITAMIN D 25-HYDROXY: 37.6 NG/ML (ref 30–100)
VLDLC SERPL CALC-MCNC: 74 MG/DL
WBC # BLD: 5.4 K/UL (ref 4.5–11.5)

## 2024-06-10 RX ORDER — METOPROLOL SUCCINATE 50 MG/1
50 TABLET, EXTENDED RELEASE ORAL DAILY
Qty: 30 TABLET | Refills: 0 | Status: SHIPPED | OUTPATIENT
Start: 2024-06-10

## 2024-06-10 RX ORDER — BUMETANIDE 0.5 MG/1
0.5 TABLET ORAL DAILY
Qty: 30 TABLET | Refills: 0 | Status: SHIPPED | OUTPATIENT
Start: 2024-06-10

## 2024-06-12 ENCOUNTER — OFFICE VISIT (OUTPATIENT)
Dept: PRIMARY CARE CLINIC | Age: 59
End: 2024-06-12
Payer: COMMERCIAL

## 2024-06-12 VITALS
HEART RATE: 58 BPM | DIASTOLIC BLOOD PRESSURE: 80 MMHG | TEMPERATURE: 98 F | HEIGHT: 64 IN | OXYGEN SATURATION: 98 % | SYSTOLIC BLOOD PRESSURE: 130 MMHG | WEIGHT: 203 LBS | BODY MASS INDEX: 34.66 KG/M2

## 2024-06-12 DIAGNOSIS — E78.5 HYPERLIPIDEMIA, UNSPECIFIED HYPERLIPIDEMIA TYPE: ICD-10-CM

## 2024-06-12 DIAGNOSIS — N18.32 TYPE 2 DIABETES MELLITUS WITH STAGE 3B CHRONIC KIDNEY DISEASE, WITHOUT LONG-TERM CURRENT USE OF INSULIN (HCC): ICD-10-CM

## 2024-06-12 DIAGNOSIS — N18.4 STAGE 4 CHRONIC KIDNEY DISEASE (HCC): ICD-10-CM

## 2024-06-12 DIAGNOSIS — I10 ESSENTIAL HYPERTENSION: Primary | ICD-10-CM

## 2024-06-12 DIAGNOSIS — E11.22 TYPE 2 DIABETES MELLITUS WITH STAGE 3B CHRONIC KIDNEY DISEASE, WITHOUT LONG-TERM CURRENT USE OF INSULIN (HCC): ICD-10-CM

## 2024-06-12 PROCEDURE — 3052F HG A1C>EQUAL 8.0%<EQUAL 9.0%: CPT | Performed by: FAMILY MEDICINE

## 2024-06-12 PROCEDURE — 3075F SYST BP GE 130 - 139MM HG: CPT | Performed by: FAMILY MEDICINE

## 2024-06-12 PROCEDURE — 3079F DIAST BP 80-89 MM HG: CPT | Performed by: FAMILY MEDICINE

## 2024-06-12 PROCEDURE — 99214 OFFICE O/P EST MOD 30 MIN: CPT | Performed by: FAMILY MEDICINE

## 2024-06-12 NOTE — PROGRESS NOTES
24     Rodolfo Benites    : 1965 Sex: male   Age: 58 y.o.      Chief Complaint   Patient presents with    Hypertension    Discuss Labs       Prior to Admission medications    Medication Sig Start Date End Date Taking? Authorizing Provider   metoprolol succinate (TOPROL XL) 50 MG extended release tablet Take 1 tablet by mouth daily 6/10/24  Yes Kit Franco DO   bumetanide (BUMEX) 0.5 MG tablet Take 1 tablet by mouth daily 6/10/24  Yes Kit Franco DO   amLODIPine (NORVASC) 10 MG tablet 1 qd 5/15/24  Yes Kit Franco DO   benazepril-hydrochlorthiazide (LOTENSIN HCT) 20-12.5 MG per tablet Take 1 tablet by mouth daily 24  Yes Kit Franco DO   glipiZIDE (GLUCOTROL XL) 5 MG extended release tablet Take 1 tablet by mouth 2 times daily 24  Yes Kit Franco DO   atorvastatin (LIPITOR) 40 MG tablet Take 1 tablet by mouth nightly 24  Yes Kit Franco DO   allopurinol (ZYLOPRIM) 300 MG tablet 1 qd 24  Yes Kit Franco DO   isosorbide dinitrate (ISORDIL) 20 MG tablet Take 2 tablets by mouth 2 times daily 24  Yes iKt Franco DO   cloNIDine (CATAPRES) 0.1 MG tablet Take 1 tablet by mouth 2 times daily 24  Yes Kit Franco DO   empagliflozin (JARDIANCE) 25 MG tablet Take 1 tablet by mouth daily 23  Yes Kit Franco DO   sodium bicarbonate 650 MG tablet  23  Yes Damien La MD   Multiple Vitamins-Minerals (MULTI COMPLETE) CAPS Take by mouth   Yes Damien La MD   glucose monitoring (FREESTYLE FREEDOM) kit 1 kit by Does not apply route daily 23  Yes Kit Franco DO   Cholecalciferol (VITAMIN D3) 50 MCG ( UT) CAPS Take by mouth   Yes Damien La MD   ONE TOUCH LANCETS MISC Test one time daily.  Uses mini glucometer   Yes Damien La MD   blood glucose test strips (ASCENSIA AUTODISC VI;ONE TOUCH ULTRA TEST VI) strip Test one time daily.  Uses One Touch

## 2024-07-03 RX ORDER — CLONIDINE HYDROCHLORIDE 0.1 MG/1
0.1 TABLET ORAL 2 TIMES DAILY
Qty: 60 TABLET | Refills: 0 | Status: SHIPPED | OUTPATIENT
Start: 2024-07-03

## 2024-07-03 RX ORDER — ISOSORBIDE DINITRATE 20 MG/1
40 TABLET ORAL 2 TIMES DAILY
Qty: 60 TABLET | Refills: 0 | Status: SHIPPED | OUTPATIENT
Start: 2024-07-03

## 2024-07-03 RX ORDER — GLIPIZIDE 5 MG/1
5 TABLET, FILM COATED, EXTENDED RELEASE ORAL 2 TIMES DAILY
Qty: 60 TABLET | Refills: 0 | Status: SHIPPED | OUTPATIENT
Start: 2024-07-03

## 2024-07-03 RX ORDER — ALLOPURINOL 300 MG/1
TABLET ORAL
Qty: 30 TABLET | Refills: 0 | Status: SHIPPED | OUTPATIENT
Start: 2024-07-03

## 2024-07-03 RX ORDER — ATORVASTATIN CALCIUM 40 MG/1
40 TABLET, FILM COATED ORAL NIGHTLY
Qty: 30 TABLET | Refills: 0 | Status: SHIPPED | OUTPATIENT
Start: 2024-07-03

## 2024-07-03 RX ORDER — BUMETANIDE 0.5 MG/1
0.5 TABLET ORAL DAILY
Qty: 30 TABLET | Refills: 0 | Status: SHIPPED | OUTPATIENT
Start: 2024-07-03

## 2024-07-03 RX ORDER — BENAZEPRIL HYDROCHLORIDE AND HYDROCHLOROTHIAZIDE 20; 12.5 MG/1; MG/1
1 TABLET ORAL DAILY
Qty: 30 TABLET | Refills: 0 | Status: SHIPPED | OUTPATIENT
Start: 2024-07-03

## 2024-07-03 RX ORDER — AMLODIPINE BESYLATE 10 MG/1
TABLET ORAL
Qty: 90 TABLET | Refills: 3 | Status: SHIPPED | OUTPATIENT
Start: 2024-07-03

## 2024-07-03 RX ORDER — METOPROLOL SUCCINATE 50 MG/1
50 TABLET, EXTENDED RELEASE ORAL DAILY
Qty: 30 TABLET | Refills: 0 | Status: SHIPPED | OUTPATIENT
Start: 2024-07-03

## 2024-08-02 RX ORDER — ATORVASTATIN CALCIUM 40 MG/1
40 TABLET, FILM COATED ORAL NIGHTLY
Qty: 90 TABLET | Refills: 1 | Status: SHIPPED | OUTPATIENT
Start: 2024-08-02

## 2024-08-02 RX ORDER — BUMETANIDE 0.5 MG/1
0.5 TABLET ORAL DAILY
Qty: 90 TABLET | Refills: 1 | Status: SHIPPED | OUTPATIENT
Start: 2024-08-02

## 2024-08-02 RX ORDER — ALLOPURINOL 300 MG/1
TABLET ORAL
Qty: 90 TABLET | Refills: 1 | Status: SHIPPED | OUTPATIENT
Start: 2024-08-02

## 2024-08-02 RX ORDER — GLIPIZIDE 5 MG/1
5 TABLET, FILM COATED, EXTENDED RELEASE ORAL 2 TIMES DAILY
Qty: 180 TABLET | Refills: 1 | Status: SHIPPED | OUTPATIENT
Start: 2024-08-02

## 2024-08-02 RX ORDER — CLONIDINE HYDROCHLORIDE 0.1 MG/1
0.1 TABLET ORAL 2 TIMES DAILY
Qty: 180 TABLET | Refills: 1 | Status: SHIPPED | OUTPATIENT
Start: 2024-08-02

## 2024-08-02 RX ORDER — ISOSORBIDE DINITRATE 20 MG/1
40 TABLET ORAL 2 TIMES DAILY
Qty: 360 TABLET | Refills: 1 | Status: SHIPPED | OUTPATIENT
Start: 2024-08-02

## 2024-08-02 RX ORDER — BENAZEPRIL HYDROCHLORIDE AND HYDROCHLOROTHIAZIDE 20; 12.5 MG/1; MG/1
1 TABLET ORAL DAILY
Qty: 90 TABLET | Refills: 1 | Status: SHIPPED | OUTPATIENT
Start: 2024-08-02

## 2024-08-02 RX ORDER — METOPROLOL SUCCINATE 50 MG/1
50 TABLET, EXTENDED RELEASE ORAL DAILY
Qty: 90 TABLET | Refills: 1 | Status: SHIPPED | OUTPATIENT
Start: 2024-08-02

## 2024-08-14 DIAGNOSIS — N18.4 STAGE 4 CHRONIC KIDNEY DISEASE (HCC): ICD-10-CM

## 2024-08-14 DIAGNOSIS — E78.5 HYPERLIPIDEMIA, UNSPECIFIED HYPERLIPIDEMIA TYPE: ICD-10-CM

## 2024-08-14 DIAGNOSIS — I10 ESSENTIAL HYPERTENSION: ICD-10-CM

## 2024-08-14 DIAGNOSIS — E11.22 TYPE 2 DIABETES MELLITUS WITH STAGE 3B CHRONIC KIDNEY DISEASE, WITHOUT LONG-TERM CURRENT USE OF INSULIN (HCC): ICD-10-CM

## 2024-08-14 DIAGNOSIS — N18.32 TYPE 2 DIABETES MELLITUS WITH STAGE 3B CHRONIC KIDNEY DISEASE, WITHOUT LONG-TERM CURRENT USE OF INSULIN (HCC): ICD-10-CM

## 2024-08-14 LAB
ALBUMIN: 4.5 G/DL (ref 3.5–5.2)
ALP BLD-CCNC: 113 U/L (ref 40–129)
ALT SERPL-CCNC: 16 U/L (ref 0–40)
ANION GAP SERPL CALCULATED.3IONS-SCNC: 14 MMOL/L (ref 7–16)
AST SERPL-CCNC: 23 U/L (ref 0–39)
BILIRUB SERPL-MCNC: 0.4 MG/DL (ref 0–1.2)
BUN BLDV-MCNC: 66 MG/DL (ref 6–20)
CALCIUM SERPL-MCNC: 9.3 MG/DL (ref 8.6–10.2)
CHLORIDE BLD-SCNC: 103 MMOL/L (ref 98–107)
CHOLESTEROL, TOTAL: 186 MG/DL
CO2: 18 MMOL/L (ref 22–29)
CREAT SERPL-MCNC: 2.7 MG/DL (ref 0.7–1.2)
GFR, ESTIMATED: 27 ML/MIN/1.73M2
GLUCOSE BLD-MCNC: 192 MG/DL (ref 74–99)
HBA1C MFR BLD: 8.7 % (ref 4–5.6)
HDLC SERPL-MCNC: 24 MG/DL
LDL CHOLESTEROL: ABNORMAL MG/DL
POTASSIUM SERPL-SCNC: 4.7 MMOL/L (ref 3.5–5)
SODIUM BLD-SCNC: 135 MMOL/L (ref 132–146)
THYROXINE (T4): 6 UG/DL (ref 4.5–11.7)
TOTAL PROTEIN: 6.8 G/DL (ref 6.4–8.3)
TRIGL SERPL-MCNC: 533 MG/DL
TSH SERPL DL<=0.05 MIU/L-ACNC: 0.77 UIU/ML (ref 0.27–4.2)
VLDLC SERPL CALC-MCNC: ABNORMAL MG/DL

## 2024-09-16 ENCOUNTER — OFFICE VISIT (OUTPATIENT)
Dept: PRIMARY CARE CLINIC | Age: 59
End: 2024-09-16

## 2024-09-16 VITALS
SYSTOLIC BLOOD PRESSURE: 130 MMHG | HEART RATE: 65 BPM | OXYGEN SATURATION: 98 % | TEMPERATURE: 97.8 F | WEIGHT: 199 LBS | DIASTOLIC BLOOD PRESSURE: 80 MMHG | BODY MASS INDEX: 33.97 KG/M2 | HEIGHT: 64 IN

## 2024-09-16 DIAGNOSIS — N18.32 TYPE 2 DIABETES MELLITUS WITH STAGE 3B CHRONIC KIDNEY DISEASE, WITHOUT LONG-TERM CURRENT USE OF INSULIN (HCC): ICD-10-CM

## 2024-09-16 DIAGNOSIS — Z88.9 H/O SEASONAL ALLERGIES: ICD-10-CM

## 2024-09-16 DIAGNOSIS — E11.22 TYPE 2 DIABETES MELLITUS WITH STAGE 3B CHRONIC KIDNEY DISEASE, WITHOUT LONG-TERM CURRENT USE OF INSULIN (HCC): ICD-10-CM

## 2024-09-16 DIAGNOSIS — I10 ESSENTIAL HYPERTENSION: Primary | ICD-10-CM

## 2024-09-16 DIAGNOSIS — E78.5 HYPERLIPIDEMIA, UNSPECIFIED HYPERLIPIDEMIA TYPE: ICD-10-CM

## 2024-09-16 RX ORDER — METHYLPREDNISOLONE ACETATE 40 MG/ML
40 INJECTION, SUSPENSION INTRA-ARTICULAR; INTRALESIONAL; INTRAMUSCULAR; SOFT TISSUE ONCE
Status: COMPLETED | OUTPATIENT
Start: 2024-09-16 | End: 2024-09-16

## 2024-09-16 RX ADMIN — METHYLPREDNISOLONE ACETATE 40 MG: 40 INJECTION, SUSPENSION INTRA-ARTICULAR; INTRALESIONAL; INTRAMUSCULAR; SOFT TISSUE at 11:34

## 2024-11-21 DIAGNOSIS — N18.32 TYPE 2 DIABETES MELLITUS WITH STAGE 3B CHRONIC KIDNEY DISEASE, WITHOUT LONG-TERM CURRENT USE OF INSULIN (HCC): ICD-10-CM

## 2024-11-21 DIAGNOSIS — I10 ESSENTIAL HYPERTENSION: ICD-10-CM

## 2024-11-21 DIAGNOSIS — E78.5 HYPERLIPIDEMIA, UNSPECIFIED HYPERLIPIDEMIA TYPE: ICD-10-CM

## 2024-11-21 DIAGNOSIS — E11.22 TYPE 2 DIABETES MELLITUS WITH STAGE 3B CHRONIC KIDNEY DISEASE, WITHOUT LONG-TERM CURRENT USE OF INSULIN (HCC): ICD-10-CM

## 2024-11-21 LAB
ALBUMIN: 4.1 G/DL (ref 3.5–5.2)
ALP BLD-CCNC: 106 U/L (ref 40–129)
ALT SERPL-CCNC: 27 U/L (ref 0–40)
ANION GAP SERPL CALCULATED.3IONS-SCNC: 17 MMOL/L (ref 7–16)
AST SERPL-CCNC: 32 U/L (ref 0–39)
BASOPHILS ABSOLUTE: 0.05 K/UL (ref 0–0.2)
BASOPHILS RELATIVE PERCENT: 1 % (ref 0–2)
BILIRUB SERPL-MCNC: 0.4 MG/DL (ref 0–1.2)
BUN BLDV-MCNC: 51 MG/DL (ref 6–20)
CALCIUM SERPL-MCNC: 9.5 MG/DL (ref 8.6–10.2)
CHLORIDE BLD-SCNC: 102 MMOL/L (ref 98–107)
CHOLESTEROL, TOTAL: 184 MG/DL
CO2: 24 MMOL/L (ref 22–29)
CREAT SERPL-MCNC: 2.6 MG/DL (ref 0.7–1.2)
EOSINOPHILS ABSOLUTE: 0.25 K/UL (ref 0.05–0.5)
EOSINOPHILS RELATIVE PERCENT: 4 % (ref 0–6)
GFR, ESTIMATED: 28 ML/MIN/1.73M2
GLUCOSE BLD-MCNC: 171 MG/DL (ref 74–99)
HBA1C MFR BLD: 8.6 % (ref 4–5.6)
HCT VFR BLD CALC: 32.9 % (ref 37–54)
HDLC SERPL-MCNC: 28 MG/DL
HEMOGLOBIN: 11.1 G/DL (ref 12.5–16.5)
IMMATURE GRANULOCYTES %: 0 % (ref 0–5)
IMMATURE GRANULOCYTES ABSOLUTE: <0.03 K/UL (ref 0–0.58)
LDL CHOLESTEROL: ABNORMAL MG/DL
LYMPHOCYTES ABSOLUTE: 1.14 K/UL (ref 1.5–4)
LYMPHOCYTES RELATIVE PERCENT: 19 % (ref 20–42)
MCH RBC QN AUTO: 30.8 PG (ref 26–35)
MCHC RBC AUTO-ENTMCNC: 33.7 G/DL (ref 32–34.5)
MCV RBC AUTO: 91.4 FL (ref 80–99.9)
MONOCYTES ABSOLUTE: 0.44 K/UL (ref 0.1–0.95)
MONOCYTES RELATIVE PERCENT: 7 % (ref 2–12)
NEUTROPHILS ABSOLUTE: 4.03 K/UL (ref 1.8–7.3)
NEUTROPHILS RELATIVE PERCENT: 68 % (ref 43–80)
PDW BLD-RTO: 13 % (ref 11.5–15)
PLATELET # BLD: 145 K/UL (ref 130–450)
PMV BLD AUTO: 10.4 FL (ref 7–12)
POTASSIUM SERPL-SCNC: 4.3 MMOL/L (ref 3.5–5)
RBC # BLD: 3.6 M/UL (ref 3.8–5.8)
SODIUM BLD-SCNC: 143 MMOL/L (ref 132–146)
THYROXINE (T4): 6.7 UG/DL (ref 4.5–11.7)
TOTAL PROTEIN: 6.9 G/DL (ref 6.4–8.3)
TRIGL SERPL-MCNC: 431 MG/DL
TSH SERPL DL<=0.05 MIU/L-ACNC: 0.91 UIU/ML (ref 0.27–4.2)
VLDLC SERPL CALC-MCNC: ABNORMAL MG/DL
WBC # BLD: 5.9 K/UL (ref 4.5–11.5)

## 2024-12-16 ENCOUNTER — OFFICE VISIT (OUTPATIENT)
Dept: PRIMARY CARE CLINIC | Age: 59
End: 2024-12-16
Payer: COMMERCIAL

## 2024-12-16 VITALS
HEIGHT: 64 IN | DIASTOLIC BLOOD PRESSURE: 70 MMHG | HEART RATE: 75 BPM | OXYGEN SATURATION: 99 % | TEMPERATURE: 97.6 F | SYSTOLIC BLOOD PRESSURE: 120 MMHG | WEIGHT: 205 LBS | BODY MASS INDEX: 35 KG/M2

## 2024-12-16 DIAGNOSIS — E78.00 PURE HYPERCHOLESTEROLEMIA: ICD-10-CM

## 2024-12-16 DIAGNOSIS — N18.32 TYPE 2 DIABETES MELLITUS WITH STAGE 3B CHRONIC KIDNEY DISEASE, WITHOUT LONG-TERM CURRENT USE OF INSULIN (HCC): ICD-10-CM

## 2024-12-16 DIAGNOSIS — E11.22 TYPE 2 DIABETES MELLITUS WITH STAGE 3B CHRONIC KIDNEY DISEASE, WITHOUT LONG-TERM CURRENT USE OF INSULIN (HCC): ICD-10-CM

## 2024-12-16 DIAGNOSIS — I10 ESSENTIAL HYPERTENSION: Primary | ICD-10-CM

## 2024-12-16 DIAGNOSIS — E78.5 HYPERLIPIDEMIA, UNSPECIFIED HYPERLIPIDEMIA TYPE: ICD-10-CM

## 2024-12-16 PROCEDURE — 3078F DIAST BP <80 MM HG: CPT | Performed by: FAMILY MEDICINE

## 2024-12-16 PROCEDURE — 3074F SYST BP LT 130 MM HG: CPT | Performed by: FAMILY MEDICINE

## 2024-12-16 PROCEDURE — 99214 OFFICE O/P EST MOD 30 MIN: CPT | Performed by: FAMILY MEDICINE

## 2024-12-16 PROCEDURE — 3052F HG A1C>EQUAL 8.0%<EQUAL 9.0%: CPT | Performed by: FAMILY MEDICINE

## 2024-12-16 ASSESSMENT — ENCOUNTER SYMPTOMS
ALLERGIC/IMMUNOLOGIC NEGATIVE: 1
GASTROINTESTINAL NEGATIVE: 1
EYES NEGATIVE: 1
RESPIRATORY NEGATIVE: 1

## 2024-12-16 NOTE — PROGRESS NOTES
1157   BP: 120/70   Pulse: 75   Temp: 97.6 °F (36.4 °C)   SpO2: 99%   Weight: 93 kg (205 lb)   Height: 1.626 m (5' 4\")     BP Readings from Last 3 Encounters:   12/16/24 120/70   09/16/24 130/80   06/12/24 130/80        Physical Exam  Vitals and nursing note reviewed.   Constitutional:       Appearance: He is well-developed.   HENT:      Head: Normocephalic.      Right Ear: External ear normal.      Left Ear: External ear normal.      Nose: Nose normal.   Eyes:      Conjunctiva/sclera: Conjunctivae normal.      Pupils: Pupils are equal, round, and reactive to light.   Cardiovascular:      Rate and Rhythm: Normal rate.   Pulmonary:      Breath sounds: Normal breath sounds.   Abdominal:      General: Bowel sounds are normal.      Palpations: Abdomen is soft.   Musculoskeletal:         General: Normal range of motion.      Cervical back: Normal range of motion and neck supple.   Skin:     General: Skin is warm and dry.   Neurological:      Mental Status: He is alert and oriented to person, place, and time.   Psychiatric:         Behavior: Behavior normal.        Afebrile blood pressure controlled.  Heart regular lungs are clear.  Medications as prescribed.  Reassessment next 4 weeks and sooner if need be.          Plan Per Assessment:  Rodolfo was seen today for hypertension, discuss medications and discuss labs.    Diagnoses and all orders for this visit:    Essential hypertension    Type 2 diabetes mellitus with stage 3b chronic kidney disease, without long-term current use of insulin (HCC)    Hyperlipidemia, unspecified hyperlipidemia type    Pure hypercholesterolemia    Other orders  -     Semaglutide,0.25 or 0.5MG/DOS, 2 MG/1.5ML SOPN; 0.25mg    qweek            No follow-ups on file.      Kit Franco DO    Note was generated with the assistance of voice recognition software.  Document was reviewed however may contain grammatical errors.

## 2025-01-13 DIAGNOSIS — N18.32 TYPE 2 DIABETES MELLITUS WITH STAGE 3B CHRONIC KIDNEY DISEASE, WITHOUT LONG-TERM CURRENT USE OF INSULIN (HCC): Primary | ICD-10-CM

## 2025-01-13 DIAGNOSIS — E11.22 TYPE 2 DIABETES MELLITUS WITH STAGE 3B CHRONIC KIDNEY DISEASE, WITHOUT LONG-TERM CURRENT USE OF INSULIN (HCC): Primary | ICD-10-CM

## 2025-01-13 NOTE — TELEPHONE ENCOUNTER
Name of Medication(s) Requested:  Requested Prescriptions     Pending Prescriptions Disp Refills    empagliflozin (JARDIANCE) 25 MG tablet 90 tablet 1     Sig: Take 1 tablet by mouth daily       Medication is on current medication list Yes    Dosage and directions were verified? Yes    Quantity verified: 90 day supply     Pharmacy Verified?  Yes    Last Appointment:  12/16/2024    Future appts:  Future Appointments   Date Time Provider Department Center   1/17/2025 11:45 AM Kit Franco DO [facility] DE[facility]        (If no appt send self scheduling link. .REFILLAPPT)  Scheduling request sent?     [] Yes  [x] No    Does patient need updated?  [] Yes  [x] No

## 2025-01-17 ENCOUNTER — OFFICE VISIT (OUTPATIENT)
Dept: PRIMARY CARE CLINIC | Age: 60
End: 2025-01-17
Payer: COMMERCIAL

## 2025-01-17 VITALS
HEIGHT: 64 IN | DIASTOLIC BLOOD PRESSURE: 80 MMHG | OXYGEN SATURATION: 97 % | HEART RATE: 75 BPM | SYSTOLIC BLOOD PRESSURE: 136 MMHG | BODY MASS INDEX: 35.85 KG/M2 | WEIGHT: 210 LBS | TEMPERATURE: 97.7 F

## 2025-01-17 DIAGNOSIS — N18.32 TYPE 2 DIABETES MELLITUS WITH STAGE 3B CHRONIC KIDNEY DISEASE, WITHOUT LONG-TERM CURRENT USE OF INSULIN (HCC): Primary | ICD-10-CM

## 2025-01-17 DIAGNOSIS — I10 ESSENTIAL HYPERTENSION: ICD-10-CM

## 2025-01-17 DIAGNOSIS — N18.4 STAGE 4 CHRONIC KIDNEY DISEASE (HCC): ICD-10-CM

## 2025-01-17 DIAGNOSIS — R60.0 LOCALIZED EDEMA: ICD-10-CM

## 2025-01-17 DIAGNOSIS — E11.22 TYPE 2 DIABETES MELLITUS WITH STAGE 3B CHRONIC KIDNEY DISEASE, WITHOUT LONG-TERM CURRENT USE OF INSULIN (HCC): Primary | ICD-10-CM

## 2025-01-17 DIAGNOSIS — E78.5 HYPERLIPIDEMIA, UNSPECIFIED HYPERLIPIDEMIA TYPE: ICD-10-CM

## 2025-01-17 PROCEDURE — 3075F SYST BP GE 130 - 139MM HG: CPT | Performed by: FAMILY MEDICINE

## 2025-01-17 PROCEDURE — 3079F DIAST BP 80-89 MM HG: CPT | Performed by: FAMILY MEDICINE

## 2025-01-17 PROCEDURE — 99214 OFFICE O/P EST MOD 30 MIN: CPT | Performed by: FAMILY MEDICINE

## 2025-01-17 RX ORDER — BUMETANIDE 1 MG/1
TABLET ORAL
Qty: 30 TABLET | Refills: 2 | Status: SHIPPED | OUTPATIENT
Start: 2025-01-17

## 2025-01-17 SDOH — ECONOMIC STABILITY: FOOD INSECURITY: WITHIN THE PAST 12 MONTHS, THE FOOD YOU BOUGHT JUST DIDN'T LAST AND YOU DIDN'T HAVE MONEY TO GET MORE.: NEVER TRUE

## 2025-01-17 SDOH — ECONOMIC STABILITY: FOOD INSECURITY: WITHIN THE PAST 12 MONTHS, YOU WORRIED THAT YOUR FOOD WOULD RUN OUT BEFORE YOU GOT MONEY TO BUY MORE.: NEVER TRUE

## 2025-01-17 ASSESSMENT — PATIENT HEALTH QUESTIONNAIRE - PHQ9
SUM OF ALL RESPONSES TO PHQ QUESTIONS 1-9: 0
1. LITTLE INTEREST OR PLEASURE IN DOING THINGS: NOT AT ALL
SUM OF ALL RESPONSES TO PHQ9 QUESTIONS 1 & 2: 0
SUM OF ALL RESPONSES TO PHQ QUESTIONS 1-9: 0
SUM OF ALL RESPONSES TO PHQ QUESTIONS 1-9: 0
2. FEELING DOWN, DEPRESSED OR HOPELESS: NOT AT ALL
SUM OF ALL RESPONSES TO PHQ QUESTIONS 1-9: 0

## 2025-01-17 NOTE — PROGRESS NOTES
dependent type 2 diabetes mellitus (HCC)     Obesity        Vitals:    01/17/25 1205   BP: 136/80   Pulse: 75   Temp: 97.7 °F (36.5 °C)   SpO2: 97%   Weight: 95.3 kg (210 lb)   Height: 1.626 m (5' 4\")     BP Readings from Last 3 Encounters:   01/17/25 136/80   12/16/24 120/70   09/16/24 130/80        Physical Exam  Vitals and nursing note reviewed.   Constitutional:       Appearance: He is well-developed.   HENT:      Head: Normocephalic.      Right Ear: External ear normal.      Left Ear: External ear normal.      Nose: Nose normal.   Eyes:      Conjunctiva/sclera: Conjunctivae normal.      Pupils: Pupils are equal, round, and reactive to light.   Cardiovascular:      Rate and Rhythm: Normal rate.   Pulmonary:      Breath sounds: Normal breath sounds.   Abdominal:      General: Bowel sounds are normal.      Palpations: Abdomen is soft.   Musculoskeletal:         General: Normal range of motion.      Cervical back: Normal range of motion and neck supple.   Skin:     General: Skin is warm and dry.   Neurological:      Mental Status: He is alert and oriented to person, place, and time.   Psychiatric:         Behavior: Behavior normal.        Today's vitals physical exam stable.  Heart regular lungs clear.  1-2+ edema both lower extremities and medication adjustments have been made.  Lab studies will be completed reevaluate in 3 weeks and then further recommendations.          Plan Per Assessment:  Rodolfo was seen today for hypertension and leg swelling.    Diagnoses and all orders for this visit:    Type 2 diabetes mellitus with stage 3b chronic kidney disease, without long-term current use of insulin ([facility])  -     Albumin/Creatinine Ratio, Urine; Future  -     Comprehensive Metabolic Panel; Future  -     CBC with Auto Differential; Future  -     Lipid Panel; Future  -     TSH; Future  -     T4; Future  -     Hemoglobin A1C; Future    Essential hypertension  -     Comprehensive Metabolic Panel; Future  -     CBC

## 2025-02-06 DIAGNOSIS — I10 ESSENTIAL HYPERTENSION: ICD-10-CM

## 2025-02-06 DIAGNOSIS — N18.4 STAGE 4 CHRONIC KIDNEY DISEASE (HCC): ICD-10-CM

## 2025-02-06 DIAGNOSIS — E11.22 TYPE 2 DIABETES MELLITUS WITH STAGE 3B CHRONIC KIDNEY DISEASE, WITHOUT LONG-TERM CURRENT USE OF INSULIN (HCC): ICD-10-CM

## 2025-02-06 DIAGNOSIS — N18.32 TYPE 2 DIABETES MELLITUS WITH STAGE 3B CHRONIC KIDNEY DISEASE, WITHOUT LONG-TERM CURRENT USE OF INSULIN (HCC): ICD-10-CM

## 2025-02-06 DIAGNOSIS — R60.0 LOCALIZED EDEMA: ICD-10-CM

## 2025-02-06 DIAGNOSIS — E78.5 HYPERLIPIDEMIA, UNSPECIFIED HYPERLIPIDEMIA TYPE: ICD-10-CM

## 2025-02-06 LAB
ALBUMIN: 4.2 G/DL (ref 3.5–5.2)
ALP BLD-CCNC: 112 U/L (ref 40–129)
ALT SERPL-CCNC: 20 U/L (ref 0–40)
ANION GAP SERPL CALCULATED.3IONS-SCNC: 16 MMOL/L (ref 7–16)
AST SERPL-CCNC: 21 U/L (ref 0–39)
BASOPHILS ABSOLUTE: 0.06 K/UL (ref 0–0.2)
BASOPHILS RELATIVE PERCENT: 1 % (ref 0–2)
BILIRUB SERPL-MCNC: 0.5 MG/DL (ref 0–1.2)
BUN BLDV-MCNC: 53 MG/DL (ref 6–20)
CALCIUM SERPL-MCNC: 9.5 MG/DL (ref 8.6–10.2)
CHLORIDE BLD-SCNC: 104 MMOL/L (ref 98–107)
CHOLESTEROL, TOTAL: 205 MG/DL
CO2: 22 MMOL/L (ref 22–29)
CREAT SERPL-MCNC: 2.6 MG/DL (ref 0.7–1.2)
EOSINOPHILS ABSOLUTE: 0.25 K/UL (ref 0.05–0.5)
EOSINOPHILS RELATIVE PERCENT: 4 % (ref 0–6)
GFR, ESTIMATED: 28 ML/MIN/1.73M2
GLUCOSE BLD-MCNC: 176 MG/DL (ref 74–99)
HCT VFR BLD CALC: 33.3 % (ref 37–54)
HDLC SERPL-MCNC: 28 MG/DL
HEMOGLOBIN: 11.2 G/DL (ref 12.5–16.5)
IMMATURE GRANULOCYTES %: 0 % (ref 0–5)
IMMATURE GRANULOCYTES ABSOLUTE: <0.03 K/UL (ref 0–0.58)
LDL CHOLESTEROL: ABNORMAL MG/DL
LYMPHOCYTES ABSOLUTE: 1.27 K/UL (ref 1.5–4)
LYMPHOCYTES RELATIVE PERCENT: 21 % (ref 20–42)
MCH RBC QN AUTO: 30.5 PG (ref 26–35)
MCHC RBC AUTO-ENTMCNC: 33.6 G/DL (ref 32–34.5)
MCV RBC AUTO: 90.7 FL (ref 80–99.9)
MONOCYTES ABSOLUTE: 0.41 K/UL (ref 0.1–0.95)
MONOCYTES RELATIVE PERCENT: 7 % (ref 2–12)
NEUTROPHILS ABSOLUTE: 3.93 K/UL (ref 1.8–7.3)
NEUTROPHILS RELATIVE PERCENT: 66 % (ref 43–80)
PDW BLD-RTO: 13 % (ref 11.5–15)
PLATELET # BLD: 146 K/UL (ref 130–450)
PMV BLD AUTO: 10.4 FL (ref 7–12)
POTASSIUM SERPL-SCNC: 4.3 MMOL/L (ref 3.5–5)
RBC # BLD: 3.67 M/UL (ref 3.8–5.8)
SODIUM BLD-SCNC: 142 MMOL/L (ref 132–146)
THYROXINE (T4): 6 UG/DL (ref 4.5–11.7)
TOTAL PROTEIN: 7 G/DL (ref 6.4–8.3)
TRIGL SERPL-MCNC: 517 MG/DL
TSH SERPL DL<=0.05 MIU/L-ACNC: 0.76 UIU/ML (ref 0.27–4.2)
VLDLC SERPL CALC-MCNC: ABNORMAL MG/DL
WBC # BLD: 5.9 K/UL (ref 4.5–11.5)

## 2025-02-07 LAB — HBA1C MFR BLD: 8.2 % (ref 4–5.6)

## 2025-02-10 ENCOUNTER — OFFICE VISIT (OUTPATIENT)
Dept: PRIMARY CARE CLINIC | Age: 60
End: 2025-02-10
Payer: COMMERCIAL

## 2025-02-10 VITALS
TEMPERATURE: 97.5 F | HEIGHT: 64 IN | BODY MASS INDEX: 34.15 KG/M2 | WEIGHT: 200 LBS | OXYGEN SATURATION: 98 % | SYSTOLIC BLOOD PRESSURE: 136 MMHG | HEART RATE: 56 BPM | DIASTOLIC BLOOD PRESSURE: 78 MMHG

## 2025-02-10 DIAGNOSIS — N18.4 STAGE 4 CHRONIC KIDNEY DISEASE (HCC): ICD-10-CM

## 2025-02-10 DIAGNOSIS — I10 ESSENTIAL HYPERTENSION: ICD-10-CM

## 2025-02-10 DIAGNOSIS — N18.32 TYPE 2 DIABETES MELLITUS WITH STAGE 3B CHRONIC KIDNEY DISEASE, WITHOUT LONG-TERM CURRENT USE OF INSULIN (HCC): Primary | ICD-10-CM

## 2025-02-10 DIAGNOSIS — E78.00 PURE HYPERCHOLESTEROLEMIA: ICD-10-CM

## 2025-02-10 DIAGNOSIS — E78.5 HYPERLIPIDEMIA, UNSPECIFIED HYPERLIPIDEMIA TYPE: ICD-10-CM

## 2025-02-10 DIAGNOSIS — E11.22 TYPE 2 DIABETES MELLITUS WITH STAGE 3B CHRONIC KIDNEY DISEASE, WITHOUT LONG-TERM CURRENT USE OF INSULIN (HCC): Primary | ICD-10-CM

## 2025-02-10 DIAGNOSIS — R60.0 LOCALIZED EDEMA: ICD-10-CM

## 2025-02-10 PROCEDURE — 3052F HG A1C>EQUAL 8.0%<EQUAL 9.0%: CPT | Performed by: FAMILY MEDICINE

## 2025-02-10 PROCEDURE — 3075F SYST BP GE 130 - 139MM HG: CPT | Performed by: FAMILY MEDICINE

## 2025-02-10 PROCEDURE — 99214 OFFICE O/P EST MOD 30 MIN: CPT | Performed by: FAMILY MEDICINE

## 2025-02-10 PROCEDURE — 3078F DIAST BP <80 MM HG: CPT | Performed by: FAMILY MEDICINE

## 2025-02-10 RX ORDER — ROSUVASTATIN CALCIUM 20 MG/1
20 TABLET, COATED ORAL DAILY
Qty: 30 TABLET | Refills: 5 | Status: SHIPPED | OUTPATIENT
Start: 2025-02-10

## 2025-02-10 NOTE — PROGRESS NOTES
Future  -     Lipid Panel; Future  -     CK; Future    Stage 4 chronic kidney disease (HCC)  -     Comprehensive Metabolic Panel; Future  -     Lipid Panel; Future  -     CK; Future    Localized edema    Pure hypercholesterolemia    Other orders  -     rosuvastatin (CRESTOR) 20 MG tablet; Take 1 tablet by mouth daily            Return in about 6 weeks (around 3/24/2025).      Kit Franco,     Note was generated with the assistance of voice recognition software.  Document was reviewed however may contain grammatical errors.

## 2025-03-24 RX ORDER — ALLOPURINOL 300 MG/1
TABLET ORAL
Qty: 90 TABLET | Refills: 1 | Status: SHIPPED | OUTPATIENT
Start: 2025-03-24

## 2025-03-24 RX ORDER — ISOSORBIDE DINITRATE 20 MG/1
40 TABLET ORAL 2 TIMES DAILY
Qty: 360 TABLET | Refills: 1 | Status: SHIPPED | OUTPATIENT
Start: 2025-03-24

## 2025-03-24 RX ORDER — GLIPIZIDE 5 MG/1
5 TABLET, FILM COATED, EXTENDED RELEASE ORAL 2 TIMES DAILY
Qty: 180 TABLET | Refills: 1 | Status: SHIPPED | OUTPATIENT
Start: 2025-03-24

## 2025-04-29 ENCOUNTER — OFFICE VISIT (OUTPATIENT)
Dept: PRIMARY CARE CLINIC | Age: 60
End: 2025-04-29
Payer: COMMERCIAL

## 2025-04-29 VITALS
OXYGEN SATURATION: 97 % | HEIGHT: 64 IN | WEIGHT: 200 LBS | SYSTOLIC BLOOD PRESSURE: 126 MMHG | HEART RATE: 70 BPM | BODY MASS INDEX: 34.15 KG/M2 | TEMPERATURE: 98 F | DIASTOLIC BLOOD PRESSURE: 60 MMHG

## 2025-04-29 DIAGNOSIS — E78.5 HYPERLIPIDEMIA, UNSPECIFIED HYPERLIPIDEMIA TYPE: ICD-10-CM

## 2025-04-29 DIAGNOSIS — N18.4 STAGE 4 CHRONIC KIDNEY DISEASE (HCC): ICD-10-CM

## 2025-04-29 DIAGNOSIS — E11.22 TYPE 2 DIABETES MELLITUS WITH STAGE 3B CHRONIC KIDNEY DISEASE, WITHOUT LONG-TERM CURRENT USE OF INSULIN (HCC): Primary | ICD-10-CM

## 2025-04-29 DIAGNOSIS — C62.12 MALIGNANT NEOPLASM OF DESCENDED LEFT TESTIS (HCC): ICD-10-CM

## 2025-04-29 DIAGNOSIS — N18.32 TYPE 2 DIABETES MELLITUS WITH STAGE 3B CHRONIC KIDNEY DISEASE, WITHOUT LONG-TERM CURRENT USE OF INSULIN (HCC): Primary | ICD-10-CM

## 2025-04-29 DIAGNOSIS — I10 ESSENTIAL HYPERTENSION: ICD-10-CM

## 2025-04-29 PROCEDURE — 99214 OFFICE O/P EST MOD 30 MIN: CPT | Performed by: FAMILY MEDICINE

## 2025-04-29 PROCEDURE — 3078F DIAST BP <80 MM HG: CPT | Performed by: FAMILY MEDICINE

## 2025-04-29 PROCEDURE — 3052F HG A1C>EQUAL 8.0%<EQUAL 9.0%: CPT | Performed by: FAMILY MEDICINE

## 2025-04-29 PROCEDURE — 3074F SYST BP LT 130 MM HG: CPT | Performed by: FAMILY MEDICINE

## 2025-04-29 RX ORDER — METOPROLOL SUCCINATE 50 MG/1
50 TABLET, EXTENDED RELEASE ORAL 2 TIMES DAILY
Qty: 180 TABLET | Refills: 1 | Status: SHIPPED | OUTPATIENT
Start: 2025-04-29

## 2025-04-29 RX ORDER — OLMESARTAN MEDOXOMIL AND HYDROCHLOROTHIAZIDE 40/25 40; 25 MG/1; MG/1
1 TABLET ORAL DAILY
COMMUNITY
Start: 2025-03-04

## 2025-04-29 RX ORDER — CALCITRIOL 0.25 UG/1
0.25 CAPSULE, LIQUID FILLED ORAL DAILY
COMMUNITY
Start: 2025-03-04

## 2025-04-29 NOTE — PROGRESS NOTES
25     Rodolfo Benites    : 1965 Sex: male   Age: 59 y.o.      Chief Complaint   Patient presents with    Diabetes       Prior to Admission medications    Medication Sig Start Date End Date Taking? Authorizing Provider   calcitRIOL (ROCALTROL) 0.25 MCG capsule Take 1 capsule by mouth daily 3/4/25  Yes ProviderDamien MD   olmesartan-hydroCHLOROthiazide (BENICAR HCT) 40-25 MG per tablet Take 1 tablet by mouth daily 3/4/25  Yes Damien La MD   metoprolol succinate (TOPROL XL) 50 MG extended release tablet Take 1 tablet by mouth in the morning and at bedtime 25  Yes Kit Franco DO   glipiZIDE (GLUCOTROL XL) 5 MG extended release tablet Take 1 tablet by mouth 2 times daily 3/24/25  Yes Kit Franco DO   isosorbide dinitrate (ISORDIL) 20 MG tablet Take 2 tablets by mouth 2 times daily 3/24/25  Yes Kit Franco DO   allopurinol (ZYLOPRIM) 300 MG tablet 1 qd 3/24/25  Yes Kit Franco DO   rosuvastatin (CRESTOR) 20 MG tablet Take 1 tablet by mouth daily 2/10/25  Yes Kit Franco DO   bumetanide (BUMEX) 1 MG tablet 1 qd 25  Yes Kit Franco DO   empagliflozin (JARDIANCE) 25 MG tablet Take 1 tablet by mouth daily 25  Yes Kit Franco, DO   Semaglutide,0.25 or 0.5MG/DOS, 2 MG/1.5ML SOPN 0.25mg    qweek 24  Yes Kit Franco DO   amLODIPine (NORVASC) 10 MG tablet 1 qd 7/3/24  Yes Kit Franco DO   sodium bicarbonate 650 MG tablet Take 1 tablet by mouth 4 times daily 23  Yes Damien La MD   Multiple Vitamins-Minerals (MULTI COMPLETE) CAPS Take by mouth   Yes Damien La MD   glucose monitoring (FREESTYLE FREEDOM) kit 1 kit by Does not apply route daily 23  Yes Kit Franco DO   Cholecalciferol (VITAMIN D3) 50 MCG ( UT) CAPS Take by mouth   Yes ProviderDamien MD   ONE TOUCH LANCETS MISC Test one time daily.  Uses mini glucometer   Yes Damien La MD

## 2025-05-22 DIAGNOSIS — N18.4 STAGE 4 CHRONIC KIDNEY DISEASE (HCC): ICD-10-CM

## 2025-05-22 DIAGNOSIS — E78.5 HYPERLIPIDEMIA, UNSPECIFIED HYPERLIPIDEMIA TYPE: ICD-10-CM

## 2025-05-22 DIAGNOSIS — I10 ESSENTIAL HYPERTENSION: ICD-10-CM

## 2025-05-22 LAB
CHOLESTEROL, TOTAL: 189 MG/DL
HDLC SERPL-MCNC: 27 MG/DL
LDL CHOLESTEROL: ABNORMAL MG/DL
TOTAL CK: 113 U/L (ref 0–190)
TRIGL SERPL-MCNC: 538 MG/DL
VLDLC SERPL CALC-MCNC: ABNORMAL MG/DL

## 2025-06-10 ENCOUNTER — OFFICE VISIT (OUTPATIENT)
Dept: PRIMARY CARE CLINIC | Age: 60
End: 2025-06-10
Payer: COMMERCIAL

## 2025-06-10 VITALS
OXYGEN SATURATION: 97 % | HEIGHT: 64 IN | HEART RATE: 64 BPM | BODY MASS INDEX: 33.97 KG/M2 | SYSTOLIC BLOOD PRESSURE: 132 MMHG | WEIGHT: 199 LBS | DIASTOLIC BLOOD PRESSURE: 80 MMHG | TEMPERATURE: 97.9 F

## 2025-06-10 DIAGNOSIS — E78.5 HYPERLIPIDEMIA, UNSPECIFIED HYPERLIPIDEMIA TYPE: ICD-10-CM

## 2025-06-10 DIAGNOSIS — N18.32 TYPE 2 DIABETES MELLITUS WITH STAGE 3B CHRONIC KIDNEY DISEASE, WITHOUT LONG-TERM CURRENT USE OF INSULIN (HCC): Primary | ICD-10-CM

## 2025-06-10 DIAGNOSIS — E11.22 TYPE 2 DIABETES MELLITUS WITH STAGE 3B CHRONIC KIDNEY DISEASE, WITHOUT LONG-TERM CURRENT USE OF INSULIN (HCC): Primary | ICD-10-CM

## 2025-06-10 DIAGNOSIS — N18.4 STAGE 4 CHRONIC KIDNEY DISEASE (HCC): ICD-10-CM

## 2025-06-10 DIAGNOSIS — I10 ESSENTIAL HYPERTENSION: ICD-10-CM

## 2025-06-10 PROCEDURE — 3079F DIAST BP 80-89 MM HG: CPT | Performed by: FAMILY MEDICINE

## 2025-06-10 PROCEDURE — 3052F HG A1C>EQUAL 8.0%<EQUAL 9.0%: CPT | Performed by: FAMILY MEDICINE

## 2025-06-10 PROCEDURE — 99214 OFFICE O/P EST MOD 30 MIN: CPT | Performed by: FAMILY MEDICINE

## 2025-06-10 PROCEDURE — 3075F SYST BP GE 130 - 139MM HG: CPT | Performed by: FAMILY MEDICINE

## 2025-06-10 NOTE — PROGRESS NOTES
6/10/25     Rodolfo Benites    : 1965 Sex: male   Age: 59 y.o.      Chief Complaint   Patient presents with    Diabetes     Patient states he just started taking ozempic. He is on is 2nd shot.        Prior to Admission medications    Medication Sig Start Date End Date Taking? Authorizing Provider   calcitRIOL (ROCALTROL) 0.25 MCG capsule Take 1 capsule by mouth daily 3/4/25  Yes Provider, MD Damien   olmesartan-hydroCHLOROthiazide (BENICAR HCT) 40-25 MG per tablet Take 1 tablet by mouth daily 3/4/25  Yes Provider, MD Damien   metoprolol succinate (TOPROL XL) 50 MG extended release tablet Take 1 tablet by mouth in the morning and at bedtime 25  Yes Kit Franco DO   glipiZIDE (GLUCOTROL XL) 5 MG extended release tablet Take 1 tablet by mouth 2 times daily 3/24/25  Yes Kit Franco DO   isosorbide dinitrate (ISORDIL) 20 MG tablet Take 2 tablets by mouth 2 times daily 3/24/25  Yes Kit Franco DO   allopurinol (ZYLOPRIM) 300 MG tablet 1 qd 3/24/25  Yes Kit Franco DO   rosuvastatin (CRESTOR) 20 MG tablet Take 1 tablet by mouth daily 2/10/25  Yes Kit Franco DO   bumetanide (BUMEX) 1 MG tablet 1 qd 25  Yes Kit Franco DO   empagliflozin (JARDIANCE) 25 MG tablet Take 1 tablet by mouth daily 25  Yes Kit Franco, DO   Semaglutide,0.25 or 0.5MG/DOS, 2 MG/1.5ML SOPN 0.25mg    qweek 24  Yes Kit Franco DO   amLODIPine (NORVASC) 10 MG tablet 1 qd 7/3/24  Yes Kit Franco DO   sodium bicarbonate 650 MG tablet Take 1 tablet by mouth 4 times daily 23  Yes ProviderDamien MD   Multiple Vitamins-Minerals (MULTI COMPLETE) CAPS Take by mouth   Yes Provider, MD Damien   glucose monitoring (FREESTYLE FREEDOM) kit 1 kit by Does not apply route daily 23  Yes Traikoff, Kit J, DO   Cholecalciferol (VITAMIN D3) 50 MCG ( UT) CAPS Take by mouth   Yes Provider, MD Damien   ONE TOUCH LANCETS MISC

## 2025-07-21 DIAGNOSIS — N18.32 TYPE 2 DIABETES MELLITUS WITH STAGE 3B CHRONIC KIDNEY DISEASE, WITHOUT LONG-TERM CURRENT USE OF INSULIN (HCC): ICD-10-CM

## 2025-07-21 DIAGNOSIS — E78.5 HYPERLIPIDEMIA, UNSPECIFIED HYPERLIPIDEMIA TYPE: ICD-10-CM

## 2025-07-21 DIAGNOSIS — E11.22 TYPE 2 DIABETES MELLITUS WITH STAGE 3B CHRONIC KIDNEY DISEASE, WITHOUT LONG-TERM CURRENT USE OF INSULIN (HCC): ICD-10-CM

## 2025-07-21 DIAGNOSIS — I10 ESSENTIAL HYPERTENSION: ICD-10-CM

## 2025-07-21 DIAGNOSIS — N18.4 STAGE 4 CHRONIC KIDNEY DISEASE (HCC): ICD-10-CM

## 2025-07-21 LAB
ALBUMIN: 4.2 G/DL (ref 3.5–5.2)
ALP BLD-CCNC: 80 U/L (ref 40–129)
ALT SERPL-CCNC: 24 U/L (ref 0–50)
ANION GAP SERPL CALCULATED.3IONS-SCNC: 14 MMOL/L (ref 7–16)
AST SERPL-CCNC: 24 U/L (ref 0–50)
BASOPHILS ABSOLUTE: 0.05 K/UL (ref 0–0.2)
BASOPHILS RELATIVE PERCENT: 1 % (ref 0–2)
BILIRUB SERPL-MCNC: 0.3 MG/DL (ref 0–1.2)
BUN BLDV-MCNC: 65 MG/DL (ref 6–20)
CALCIUM SERPL-MCNC: 10.1 MG/DL (ref 8.6–10)
CHLORIDE BLD-SCNC: 103 MMOL/L (ref 98–107)
CHOLESTEROL, TOTAL: 118 MG/DL
CO2: 24 MMOL/L (ref 22–29)
CREAT SERPL-MCNC: 3.3 MG/DL (ref 0.7–1.2)
EOSINOPHILS ABSOLUTE: 0 K/UL (ref 0.05–0.5)
EOSINOPHILS RELATIVE PERCENT: 0 % (ref 0–6)
GFR, ESTIMATED: 21 ML/MIN/1.73M2
GLUCOSE BLD-MCNC: 151 MG/DL (ref 74–99)
HBA1C MFR BLD: 7.7 % (ref 4–5.6)
HCT VFR BLD CALC: 29 % (ref 37–54)
HDLC SERPL-MCNC: 26 MG/DL
HEMOGLOBIN: 10.1 G/DL (ref 12.5–16.5)
IMMATURE GRANULOCYTES %: 0 % (ref 0–5)
IMMATURE GRANULOCYTES ABSOLUTE: <0.03 K/UL (ref 0–0.58)
LDL CHOLESTEROL: 28 MG/DL
LYMPHOCYTES ABSOLUTE: 1.47 K/UL (ref 1.5–4)
LYMPHOCYTES RELATIVE PERCENT: 23 % (ref 20–42)
MCH RBC QN AUTO: 31.7 PG (ref 26–35)
MCHC RBC AUTO-ENTMCNC: 34.8 G/DL (ref 32–34.5)
MCV RBC AUTO: 90.9 FL (ref 80–99.9)
MONOCYTES ABSOLUTE: 0.48 K/UL (ref 0.1–0.95)
MONOCYTES RELATIVE PERCENT: 8 % (ref 2–12)
NEUTROPHILS ABSOLUTE: 4.27 K/UL (ref 1.8–7.3)
NEUTROPHILS RELATIVE PERCENT: 68 % (ref 43–80)
PDW BLD-RTO: 13.2 % (ref 11.5–15)
PLATELET # BLD: 131 K/UL (ref 130–450)
PMV BLD AUTO: 10.3 FL (ref 7–12)
POTASSIUM SERPL-SCNC: 4.3 MMOL/L (ref 3.5–5.1)
RBC # BLD: 3.19 M/UL (ref 3.8–5.8)
SODIUM BLD-SCNC: 140 MMOL/L (ref 136–145)
THYROXINE (T4): 6.3 UG/DL (ref 4.5–11.7)
TOTAL PROTEIN: 7 G/DL (ref 6.4–8.3)
TRIGL SERPL-MCNC: 320 MG/DL
TSH SERPL DL<=0.05 MIU/L-ACNC: 0.42 UIU/ML (ref 0.27–4.2)
VLDLC SERPL CALC-MCNC: 64 MG/DL
WBC # BLD: 6.3 K/UL (ref 4.5–11.5)

## 2025-07-23 ENCOUNTER — OFFICE VISIT (OUTPATIENT)
Dept: PRIMARY CARE CLINIC | Age: 60
End: 2025-07-23
Payer: COMMERCIAL

## 2025-07-23 VITALS
HEIGHT: 64 IN | BODY MASS INDEX: 32.27 KG/M2 | SYSTOLIC BLOOD PRESSURE: 126 MMHG | HEART RATE: 73 BPM | OXYGEN SATURATION: 96 % | TEMPERATURE: 98.4 F | WEIGHT: 189 LBS | DIASTOLIC BLOOD PRESSURE: 64 MMHG

## 2025-07-23 DIAGNOSIS — E78.5 HYPERLIPIDEMIA, UNSPECIFIED HYPERLIPIDEMIA TYPE: ICD-10-CM

## 2025-07-23 DIAGNOSIS — N18.32 TYPE 2 DIABETES MELLITUS WITH STAGE 3B CHRONIC KIDNEY DISEASE, WITHOUT LONG-TERM CURRENT USE OF INSULIN (HCC): Primary | ICD-10-CM

## 2025-07-23 DIAGNOSIS — I10 ESSENTIAL HYPERTENSION: ICD-10-CM

## 2025-07-23 DIAGNOSIS — E11.22 TYPE 2 DIABETES MELLITUS WITH STAGE 3B CHRONIC KIDNEY DISEASE, WITHOUT LONG-TERM CURRENT USE OF INSULIN (HCC): Primary | ICD-10-CM

## 2025-07-23 DIAGNOSIS — Z88.9 H/O SEASONAL ALLERGIES: ICD-10-CM

## 2025-07-23 PROCEDURE — 3078F DIAST BP <80 MM HG: CPT | Performed by: FAMILY MEDICINE

## 2025-07-23 PROCEDURE — 3074F SYST BP LT 130 MM HG: CPT | Performed by: FAMILY MEDICINE

## 2025-07-23 PROCEDURE — 3051F HG A1C>EQUAL 7.0%<8.0%: CPT | Performed by: FAMILY MEDICINE

## 2025-07-23 PROCEDURE — 99214 OFFICE O/P EST MOD 30 MIN: CPT | Performed by: FAMILY MEDICINE

## 2025-07-23 RX ORDER — AMLODIPINE BESYLATE 5 MG/1
5 TABLET ORAL DAILY
COMMUNITY

## 2025-07-23 RX ORDER — AMLODIPINE BESYLATE 5 MG/1
TABLET ORAL
Qty: 90 TABLET | Refills: 3 | Status: SHIPPED | OUTPATIENT
Start: 2025-07-23

## 2025-07-23 NOTE — PROGRESS NOTES
25     Rodolfo Benites    : 1965 Sex: male   Age: 59 y.o.      Chief Complaint   Patient presents with    Diabetes    Allergies    Hiccups              Prior to Admission medications    Medication Sig Start Date End Date Taking? Authorizing Provider   amLODIPine (NORVASC) 5 MG tablet Take 1 tablet by mouth daily   Yes Damien La MD   Omega-3 Fatty Acids (FISH OIL) 300 MG CAPS Take by mouth   Yes Damien La MD   amLODIPine (NORVASC) 5 MG tablet 1 qd 25  Yes Kit Franco DO   calcitRIOL (ROCALTROL) 0.25 MCG capsule Take 1 capsule by mouth daily 3/4/25  Yes Damien La MD   olmesartan-hydroCHLOROthiazide (BENICAR HCT) 40-25 MG per tablet Take 1 tablet by mouth daily 3/4/25  Yes Damien La MD   metoprolol succinate (TOPROL XL) 50 MG extended release tablet Take 1 tablet by mouth in the morning and at bedtime 25  Yes Kit Franco DO   glipiZIDE (GLUCOTROL XL) 5 MG extended release tablet Take 1 tablet by mouth 2 times daily 3/24/25  Yes Kit Franco DO   isosorbide dinitrate (ISORDIL) 20 MG tablet Take 2 tablets by mouth 2 times daily 3/24/25  Yes Kit Franco DO   allopurinol (ZYLOPRIM) 300 MG tablet 1 qd 3/24/25  Yes Kit Franco DO   rosuvastatin (CRESTOR) 20 MG tablet Take 1 tablet by mouth daily 2/10/25  Yes Kit Franco DO   bumetanide (BUMEX) 1 MG tablet 1 qd 25  Yes Kit Franco DO   empagliflozin (JARDIANCE) 25 MG tablet Take 1 tablet by mouth daily 25  Yes Kit Franco DO   Semaglutide,0.25 or 0.5MG/DOS, 2 MG/1.5ML SOPN 0.25mg    qweek 24  Yes Kit Franco DO   sodium bicarbonate 650 MG tablet Take 1 tablet by mouth 4 times daily 23  Yes Damien La MD   Multiple Vitamins-Minerals (MULTI COMPLETE) CAPS Take by mouth   Yes Damien La MD   glucose monitoring (FREESTYLE FREEDOM) kit 1 kit by Does not apply route daily 23  Yes

## 2025-07-30 DIAGNOSIS — N18.32 TYPE 2 DIABETES MELLITUS WITH STAGE 3B CHRONIC KIDNEY DISEASE, WITHOUT LONG-TERM CURRENT USE OF INSULIN (HCC): ICD-10-CM

## 2025-07-30 DIAGNOSIS — E11.22 TYPE 2 DIABETES MELLITUS WITH STAGE 3B CHRONIC KIDNEY DISEASE, WITHOUT LONG-TERM CURRENT USE OF INSULIN (HCC): ICD-10-CM

## 2025-07-30 NOTE — TELEPHONE ENCOUNTER
Last Appointment:  7/23/2025  Future Appointments   Date Time Provider Department Center   9/3/2025  2:30 PM Kit Franco, DO [facility]

## 2025-08-12 RX ORDER — BUMETANIDE 1 MG/1
TABLET ORAL
Qty: 90 TABLET | Refills: 1 | Status: SHIPPED | OUTPATIENT
Start: 2025-08-12

## 2025-08-12 RX ORDER — ROSUVASTATIN CALCIUM 20 MG/1
20 TABLET, COATED ORAL DAILY
Qty: 90 TABLET | Refills: 1 | Status: SHIPPED | OUTPATIENT
Start: 2025-08-12

## 2025-08-22 LAB
25(OH)D3 SERPL-MCNC: 51.6 NG/ML (ref 30–100)
ALBUMIN SERPL-MCNC: 4.2 G/DL (ref 3.5–5.2)
ALP SERPL-CCNC: 86 U/L (ref 40–129)
ALT SERPL-CCNC: 27 U/L (ref 0–50)
ANION GAP SERPL CALCULATED.3IONS-SCNC: 15 MMOL/L (ref 7–16)
AST SERPL-CCNC: 28 U/L (ref 0–50)
BASOPHILS # BLD: 0.05 K/UL (ref 0–0.2)
BASOPHILS NFR BLD: 1 % (ref 0–2)
BILIRUB SERPL-MCNC: 0.3 MG/DL (ref 0–1.2)
BILIRUB UR QL STRIP: NEGATIVE
BUN SERPL-MCNC: 60 MG/DL (ref 6–20)
CALCIUM SERPL-MCNC: 9.6 MG/DL (ref 8.6–10)
CASTS #/AREA URNS LPF: ABNORMAL /LPF
CHLORIDE SERPL-SCNC: 105 MMOL/L (ref 98–107)
CLARITY UR: CLEAR
CO2 SERPL-SCNC: 24 MMOL/L (ref 22–29)
COLOR UR: YELLOW
CREAT SERPL-MCNC: 2.9 MG/DL (ref 0.7–1.2)
EOSINOPHIL # BLD: 0 K/UL (ref 0.05–0.5)
EOSINOPHILS RELATIVE PERCENT: 0 % (ref 0–6)
ERYTHROCYTE [DISTWIDTH] IN BLOOD BY AUTOMATED COUNT: 13.6 % (ref 11.5–15)
FERRITIN SERPL-MCNC: 235 NG/ML
GFR, ESTIMATED: 25 ML/MIN/1.73M2
GLUCOSE SERPL-MCNC: 112 MG/DL (ref 74–99)
GLUCOSE UR STRIP-MCNC: 500 MG/DL
HCT VFR BLD AUTO: 28.4 % (ref 37–54)
HGB BLD-MCNC: 9.6 G/DL (ref 12.5–16.5)
HGB UR QL STRIP.AUTO: NEGATIVE
IMM GRANULOCYTES # BLD AUTO: <0.03 K/UL (ref 0–0.58)
IMM GRANULOCYTES NFR BLD: 0 % (ref 0–5)
IRON SATN MFR SERPL: 19 % (ref 20–55)
IRON SERPL-MCNC: 46 UG/DL (ref 61–157)
KETONES UR STRIP-MCNC: NEGATIVE MG/DL
LEUKOCYTE ESTERASE UR QL STRIP: NEGATIVE
LYMPHOCYTES NFR BLD: 1.2 K/UL (ref 1.5–4)
LYMPHOCYTES RELATIVE PERCENT: 23 % (ref 20–42)
MAGNESIUM SERPL-MCNC: 2.1 MG/DL (ref 1.6–2.6)
MCH RBC QN AUTO: 31.5 PG (ref 26–35)
MCHC RBC AUTO-ENTMCNC: 33.8 G/DL (ref 32–34.5)
MCV RBC AUTO: 93.1 FL (ref 80–99.9)
MONOCYTES NFR BLD: 0.49 K/UL (ref 0.1–0.95)
MONOCYTES NFR BLD: 9 % (ref 2–12)
NEUTROPHILS NFR BLD: 67 % (ref 43–80)
NEUTS SEG NFR BLD: 3.5 K/UL (ref 1.8–7.3)
NITRITE UR QL STRIP: NEGATIVE
PH UR STRIP: 5.5 [PH] (ref 5–8)
PHOSPHATE SERPL-MCNC: 3.8 MG/DL (ref 2.5–4.5)
PLATELET # BLD AUTO: 136 K/UL (ref 130–450)
PMV BLD AUTO: 10.4 FL (ref 7–12)
POTASSIUM SERPL-SCNC: 4.4 MMOL/L (ref 3.5–5.1)
PROT SERPL-MCNC: 7 G/DL (ref 6.4–8.3)
PROT UR STRIP-MCNC: 30 MG/DL
PTH-INTACT SERPL-MCNC: 86 PG/ML (ref 15–65)
RBC # BLD AUTO: 3.05 M/UL (ref 3.8–5.8)
RBC #/AREA URNS HPF: ABNORMAL /HPF
SODIUM SERPL-SCNC: 143 MMOL/L (ref 136–145)
SP GR UR STRIP: 1.01 (ref 1–1.03)
TIBC SERPL-MCNC: 240 UG/DL (ref 250–450)
URATE SERPL-MCNC: 5 MG/DL (ref 3.4–7)
UROBILINOGEN UR STRIP-ACNC: 0.2 EU/DL (ref 0–1)
WBC #/AREA URNS HPF: ABNORMAL /HPF
WBC OTHER # BLD: 5.3 K/UL (ref 4.5–11.5)

## 2025-09-03 ENCOUNTER — OFFICE VISIT (OUTPATIENT)
Dept: PRIMARY CARE CLINIC | Age: 60
End: 2025-09-03
Payer: COMMERCIAL

## 2025-09-03 VITALS
SYSTOLIC BLOOD PRESSURE: 126 MMHG | OXYGEN SATURATION: 95 % | HEART RATE: 72 BPM | BODY MASS INDEX: 32.1 KG/M2 | TEMPERATURE: 97.8 F | WEIGHT: 188 LBS | DIASTOLIC BLOOD PRESSURE: 80 MMHG | HEIGHT: 64 IN

## 2025-09-03 DIAGNOSIS — I10 ESSENTIAL HYPERTENSION: ICD-10-CM

## 2025-09-03 DIAGNOSIS — E11.22 TYPE 2 DIABETES MELLITUS WITH STAGE 3B CHRONIC KIDNEY DISEASE, WITHOUT LONG-TERM CURRENT USE OF INSULIN (HCC): Primary | ICD-10-CM

## 2025-09-03 DIAGNOSIS — N18.32 TYPE 2 DIABETES MELLITUS WITH STAGE 3B CHRONIC KIDNEY DISEASE, WITHOUT LONG-TERM CURRENT USE OF INSULIN (HCC): Primary | ICD-10-CM

## 2025-09-03 DIAGNOSIS — E78.5 HYPERLIPIDEMIA, UNSPECIFIED HYPERLIPIDEMIA TYPE: ICD-10-CM

## 2025-09-03 PROCEDURE — 3074F SYST BP LT 130 MM HG: CPT | Performed by: FAMILY MEDICINE

## 2025-09-03 PROCEDURE — 3079F DIAST BP 80-89 MM HG: CPT | Performed by: FAMILY MEDICINE

## 2025-09-03 PROCEDURE — 3051F HG A1C>EQUAL 7.0%<8.0%: CPT | Performed by: FAMILY MEDICINE

## 2025-09-03 PROCEDURE — 99214 OFFICE O/P EST MOD 30 MIN: CPT | Performed by: FAMILY MEDICINE

## 2025-09-03 ASSESSMENT — ENCOUNTER SYMPTOMS
EYES NEGATIVE: 1
RESPIRATORY NEGATIVE: 1
ALLERGIC/IMMUNOLOGIC NEGATIVE: 1
GASTROINTESTINAL NEGATIVE: 1

## (undated) DEVICE — 1060 S-DRAPE SPCL INCISE 10/BX 4BX/CS: Brand: STERI-DRAPE™

## (undated) DEVICE — PACK PROCEDURE SURG RETINAL ST ES CUST

## (undated) DEVICE — LENS VITRCTMY FLAT DISP

## (undated) DEVICE — WETFIELD ERASER 25GA FINE TIP STR

## (undated) DEVICE — SHIELD EYE W3XL2.5IN UNIV CLR PLAS LTWT

## (undated) DEVICE — PROBE LSR 25GA DIR FOR VISION

## (undated) DEVICE — GLOVE SURG SZ 55 CRM LTX FREE POLYISOPRENE POLYMER BEAD ANTI

## (undated) DEVICE — VITRECTOMY PACK 25 GA INPUT

## (undated) DEVICE — GLOVE SURG SZ 65 THK91MIL LTX FREE SYN POLYISOPRENE

## (undated) DEVICE — STOPCOCK IV PRIMING 0.26ML 3 W W/ TWO FEM LUERLOK PRT 1

## (undated) DEVICE — NEEDLE SYR 18GA L1.5IN RED PLAS HUB S STL BLNT FILL W/O

## (undated) DEVICE — SYRINGE, LUER LOCK, 10ML: Brand: MEDLINE